# Patient Record
Sex: FEMALE | Race: WHITE | NOT HISPANIC OR LATINO
[De-identification: names, ages, dates, MRNs, and addresses within clinical notes are randomized per-mention and may not be internally consistent; named-entity substitution may affect disease eponyms.]

---

## 2017-02-23 ENCOUNTER — APPOINTMENT (OUTPATIENT)
Dept: FAMILY MEDICINE | Facility: CLINIC | Age: 77
End: 2017-02-23

## 2017-02-23 ENCOUNTER — NON-APPOINTMENT (OUTPATIENT)
Age: 77
End: 2017-02-23

## 2017-02-23 VITALS
DIASTOLIC BLOOD PRESSURE: 80 MMHG | HEIGHT: 62 IN | BODY MASS INDEX: 30.8 KG/M2 | WEIGHT: 167.38 LBS | SYSTOLIC BLOOD PRESSURE: 142 MMHG

## 2017-03-02 ENCOUNTER — RX RENEWAL (OUTPATIENT)
Age: 77
End: 2017-03-02

## 2017-04-06 ENCOUNTER — RX RENEWAL (OUTPATIENT)
Age: 77
End: 2017-04-06

## 2017-04-10 ENCOUNTER — APPOINTMENT (OUTPATIENT)
Dept: FAMILY MEDICINE | Facility: CLINIC | Age: 77
End: 2017-04-10

## 2017-05-31 ENCOUNTER — APPOINTMENT (OUTPATIENT)
Dept: FAMILY MEDICINE | Facility: CLINIC | Age: 77
End: 2017-05-31

## 2017-06-04 ENCOUNTER — RX RENEWAL (OUTPATIENT)
Age: 77
End: 2017-06-04

## 2017-06-12 ENCOUNTER — APPOINTMENT (OUTPATIENT)
Dept: CARDIOLOGY | Facility: CLINIC | Age: 77
End: 2017-06-12

## 2017-06-12 ENCOUNTER — NON-APPOINTMENT (OUTPATIENT)
Age: 77
End: 2017-06-12

## 2017-06-12 ENCOUNTER — APPOINTMENT (OUTPATIENT)
Dept: FAMILY MEDICINE | Facility: CLINIC | Age: 77
End: 2017-06-12

## 2017-06-12 VITALS
WEIGHT: 167 LBS | BODY MASS INDEX: 29.96 KG/M2 | HEIGHT: 62.5 IN | HEART RATE: 56 BPM | SYSTOLIC BLOOD PRESSURE: 136 MMHG | DIASTOLIC BLOOD PRESSURE: 70 MMHG

## 2017-06-12 VITALS
HEART RATE: 63 BPM | BODY MASS INDEX: 29.96 KG/M2 | DIASTOLIC BLOOD PRESSURE: 83 MMHG | HEIGHT: 62.5 IN | SYSTOLIC BLOOD PRESSURE: 151 MMHG | OXYGEN SATURATION: 95 % | WEIGHT: 167 LBS

## 2017-06-12 DIAGNOSIS — I38 ENDOCARDITIS, VALVE UNSPECIFIED: ICD-10-CM

## 2017-06-13 LAB
ALBUMIN SERPL ELPH-MCNC: 4.3 G/DL
ALP BLD-CCNC: 59 U/L
ALT SERPL-CCNC: 14 U/L
ANION GAP SERPL CALC-SCNC: 17 MMOL/L
AST SERPL-CCNC: 19 U/L
BILIRUB SERPL-MCNC: 0.6 MG/DL
BUN SERPL-MCNC: 15 MG/DL
CALCIUM SERPL-MCNC: 9.8 MG/DL
CHLORIDE SERPL-SCNC: 101 MMOL/L
CHOLEST SERPL-MCNC: 119 MG/DL
CHOLEST/HDLC SERPL: 2.8 RATIO
CO2 SERPL-SCNC: 23 MMOL/L
CREAT SERPL-MCNC: 0.7 MG/DL
GLUCOSE SERPL-MCNC: 116 MG/DL
HBA1C MFR BLD HPLC: 6 %
HDLC SERPL-MCNC: 43 MG/DL
LDLC SERPL CALC-MCNC: 56 MG/DL
POTASSIUM SERPL-SCNC: 4.7 MMOL/L
PROT SERPL-MCNC: 7.5 G/DL
SODIUM SERPL-SCNC: 141 MMOL/L
TRIGL SERPL-MCNC: 102 MG/DL
TSH SERPL-ACNC: 0.83 UIU/ML

## 2017-06-20 ENCOUNTER — APPOINTMENT (OUTPATIENT)
Dept: CARDIOLOGY | Facility: CLINIC | Age: 77
End: 2017-06-20

## 2017-07-12 ENCOUNTER — APPOINTMENT (OUTPATIENT)
Dept: CARDIOLOGY | Facility: CLINIC | Age: 77
End: 2017-07-12

## 2017-07-12 VITALS
DIASTOLIC BLOOD PRESSURE: 84 MMHG | WEIGHT: 166 LBS | BODY MASS INDEX: 29.79 KG/M2 | SYSTOLIC BLOOD PRESSURE: 134 MMHG | OXYGEN SATURATION: 97 % | HEIGHT: 62.5 IN | HEART RATE: 69 BPM

## 2017-07-14 ENCOUNTER — RX RENEWAL (OUTPATIENT)
Age: 77
End: 2017-07-14

## 2017-07-27 ENCOUNTER — APPOINTMENT (OUTPATIENT)
Dept: FAMILY MEDICINE | Facility: CLINIC | Age: 77
End: 2017-07-27
Payer: MEDICARE

## 2017-07-27 VITALS
DIASTOLIC BLOOD PRESSURE: 70 MMHG | TEMPERATURE: 98 F | SYSTOLIC BLOOD PRESSURE: 118 MMHG | HEIGHT: 62.5 IN | WEIGHT: 164 LBS | BODY MASS INDEX: 29.43 KG/M2

## 2017-07-27 DIAGNOSIS — M85.80 OTHER SPECIFIED DISORDERS OF BONE DENSITY AND STRUCTURE, UNSPECIFIED SITE: ICD-10-CM

## 2017-07-27 PROCEDURE — 36415 COLL VENOUS BLD VENIPUNCTURE: CPT

## 2017-07-27 PROCEDURE — 99214 OFFICE O/P EST MOD 30 MIN: CPT | Mod: 25

## 2017-07-28 ENCOUNTER — TRANSCRIPTION ENCOUNTER (OUTPATIENT)
Age: 77
End: 2017-07-28

## 2017-07-28 LAB — 25(OH)D3 SERPL-MCNC: 44.4 NG/ML

## 2017-08-02 ENCOUNTER — RX RENEWAL (OUTPATIENT)
Age: 77
End: 2017-08-02

## 2017-08-07 ENCOUNTER — RX RENEWAL (OUTPATIENT)
Age: 77
End: 2017-08-07

## 2017-09-18 ENCOUNTER — APPOINTMENT (OUTPATIENT)
Dept: FAMILY MEDICINE | Facility: CLINIC | Age: 77
End: 2017-09-18
Payer: MEDICARE

## 2017-09-18 VITALS
BODY MASS INDEX: 31.24 KG/M2 | HEART RATE: 79 BPM | WEIGHT: 174.13 LBS | OXYGEN SATURATION: 98 % | HEIGHT: 62.5 IN | SYSTOLIC BLOOD PRESSURE: 132 MMHG | DIASTOLIC BLOOD PRESSURE: 88 MMHG

## 2017-09-18 DIAGNOSIS — Z86.39 PERSONAL HISTORY OF OTHER ENDOCRINE, NUTRITIONAL AND METABOLIC DISEASE: ICD-10-CM

## 2017-09-18 PROCEDURE — 36415 COLL VENOUS BLD VENIPUNCTURE: CPT

## 2017-09-18 PROCEDURE — G0008: CPT

## 2017-09-18 PROCEDURE — 99214 OFFICE O/P EST MOD 30 MIN: CPT | Mod: 25

## 2017-09-18 PROCEDURE — 90662 IIV NO PRSV INCREASED AG IM: CPT

## 2017-09-18 RX ORDER — METHYLPREDNISOLONE 4 MG/1
4 TABLET ORAL
Qty: 1 | Refills: 0 | Status: DISCONTINUED | COMMUNITY
Start: 2017-07-27 | End: 2017-09-18

## 2017-09-19 ENCOUNTER — APPOINTMENT (OUTPATIENT)
Dept: CARDIOLOGY | Facility: CLINIC | Age: 77
End: 2017-09-19
Payer: MEDICARE

## 2017-09-19 VITALS
BODY MASS INDEX: 31.22 KG/M2 | WEIGHT: 174 LBS | HEART RATE: 55 BPM | HEIGHT: 62.5 IN | DIASTOLIC BLOOD PRESSURE: 91 MMHG | SYSTOLIC BLOOD PRESSURE: 134 MMHG

## 2017-09-19 LAB
ALBUMIN SERPL ELPH-MCNC: 4.5 G/DL
ALP BLD-CCNC: 54 U/L
ALT SERPL-CCNC: 20 U/L
ANION GAP SERPL CALC-SCNC: 16 MMOL/L
AST SERPL-CCNC: 22 U/L
BILIRUB SERPL-MCNC: 0.6 MG/DL
BUN SERPL-MCNC: 15 MG/DL
CALCIUM SERPL-MCNC: 9.8 MG/DL
CHLORIDE SERPL-SCNC: 103 MMOL/L
CO2 SERPL-SCNC: 25 MMOL/L
CREAT SERPL-MCNC: 0.85 MG/DL
GLUCOSE SERPL-MCNC: 87 MG/DL
HBA1C MFR BLD HPLC: 6.4 %
POTASSIUM SERPL-SCNC: 4 MMOL/L
PROT SERPL-MCNC: 7.5 G/DL
SODIUM SERPL-SCNC: 144 MMOL/L

## 2017-09-19 PROCEDURE — 99214 OFFICE O/P EST MOD 30 MIN: CPT

## 2017-10-10 ENCOUNTER — RX RENEWAL (OUTPATIENT)
Age: 77
End: 2017-10-10

## 2017-10-17 ENCOUNTER — RX RENEWAL (OUTPATIENT)
Age: 77
End: 2017-10-17

## 2017-11-05 ENCOUNTER — RX RENEWAL (OUTPATIENT)
Age: 77
End: 2017-11-05

## 2017-11-17 ENCOUNTER — RX RENEWAL (OUTPATIENT)
Age: 77
End: 2017-11-17

## 2017-12-18 ENCOUNTER — APPOINTMENT (OUTPATIENT)
Dept: FAMILY MEDICINE | Facility: CLINIC | Age: 77
End: 2017-12-18

## 2018-01-29 ENCOUNTER — APPOINTMENT (OUTPATIENT)
Dept: FAMILY MEDICINE | Facility: CLINIC | Age: 78
End: 2018-01-29
Payer: MEDICARE

## 2018-01-29 VITALS
SYSTOLIC BLOOD PRESSURE: 140 MMHG | BODY MASS INDEX: 32.21 KG/M2 | DIASTOLIC BLOOD PRESSURE: 84 MMHG | HEIGHT: 62.5 IN | WEIGHT: 179.5 LBS

## 2018-01-29 PROCEDURE — 99214 OFFICE O/P EST MOD 30 MIN: CPT | Mod: 25

## 2018-01-29 PROCEDURE — 36415 COLL VENOUS BLD VENIPUNCTURE: CPT

## 2018-01-29 RX ORDER — ISOSORBIDE MONONITRATE 20 MG/1
20 TABLET ORAL
Qty: 30 | Refills: 1 | Status: DISCONTINUED | COMMUNITY
Start: 2017-11-17 | End: 2018-01-29

## 2018-01-30 LAB
ALBUMIN SERPL ELPH-MCNC: 4.1 G/DL
ALP BLD-CCNC: 57 U/L
ALT SERPL-CCNC: 22 U/L
ANION GAP SERPL CALC-SCNC: 18 MMOL/L
AST SERPL-CCNC: 26 U/L
BASOPHILS # BLD AUTO: 0.09 K/UL
BASOPHILS NFR BLD AUTO: 1 %
BILIRUB SERPL-MCNC: 0.6 MG/DL
BUN SERPL-MCNC: 20 MG/DL
CALCIUM SERPL-MCNC: 10.1 MG/DL
CHLORIDE SERPL-SCNC: 101 MMOL/L
CHOLEST SERPL-MCNC: 122 MG/DL
CHOLEST/HDLC SERPL: 2.9 RATIO
CO2 SERPL-SCNC: 22 MMOL/L
CREAT SERPL-MCNC: 0.7 MG/DL
EOSINOPHIL # BLD AUTO: 0.31 K/UL
EOSINOPHIL NFR BLD AUTO: 3.5 %
GLUCOSE SERPL-MCNC: 108 MG/DL
HBA1C MFR BLD HPLC: 6.8 %
HCT VFR BLD CALC: 46.3 %
HDLC SERPL-MCNC: 42 MG/DL
HGB BLD-MCNC: 15 G/DL
IMM GRANULOCYTES NFR BLD AUTO: 0.4 %
LDLC SERPL CALC-MCNC: 60 MG/DL
LYMPHOCYTES # BLD AUTO: 2.4 K/UL
LYMPHOCYTES NFR BLD AUTO: 26.8 %
MAN DIFF?: NORMAL
MCHC RBC-ENTMCNC: 29.8 PG
MCHC RBC-ENTMCNC: 32.4 GM/DL
MCV RBC AUTO: 92 FL
MONOCYTES # BLD AUTO: 0.58 K/UL
MONOCYTES NFR BLD AUTO: 6.5 %
NEUTROPHILS # BLD AUTO: 5.53 K/UL
NEUTROPHILS NFR BLD AUTO: 61.8 %
PLATELET # BLD AUTO: 248 K/UL
POTASSIUM SERPL-SCNC: 4.6 MMOL/L
PROT SERPL-MCNC: 7.2 G/DL
RBC # BLD: 5.03 M/UL
RBC # FLD: 15.2 %
RHEUMATOID FACT SER QL: <7 IU/ML
SODIUM SERPL-SCNC: 141 MMOL/L
TRIGL SERPL-MCNC: 98 MG/DL
TTG IGA SER IA-ACNC: 5.2 UNITS
TTG IGA SER-ACNC: NEGATIVE
TTG IGG SER IA-ACNC: <5 UNITS
TTG IGG SER IA-ACNC: NEGATIVE
WBC # FLD AUTO: 8.95 K/UL

## 2018-01-31 LAB
ENDOMYSIUM IGA SER QL: NEGATIVE
ENDOMYSIUM IGA TITR SER: NORMAL

## 2018-02-13 ENCOUNTER — RX RENEWAL (OUTPATIENT)
Age: 78
End: 2018-02-13

## 2018-02-20 ENCOUNTER — NON-APPOINTMENT (OUTPATIENT)
Age: 78
End: 2018-02-20

## 2018-02-20 ENCOUNTER — APPOINTMENT (OUTPATIENT)
Dept: CARDIOLOGY | Facility: CLINIC | Age: 78
End: 2018-02-20
Payer: MEDICARE

## 2018-02-20 VITALS — BODY MASS INDEX: 32.21 KG/M2 | HEIGHT: 62.5 IN | WEIGHT: 179.5 LBS

## 2018-02-20 VITALS — DIASTOLIC BLOOD PRESSURE: 84 MMHG | SYSTOLIC BLOOD PRESSURE: 207 MMHG | HEART RATE: 70 BPM | OXYGEN SATURATION: 96 %

## 2018-02-20 PROCEDURE — 99214 OFFICE O/P EST MOD 30 MIN: CPT | Mod: 25

## 2018-02-20 PROCEDURE — 93000 ELECTROCARDIOGRAM COMPLETE: CPT

## 2018-02-20 RX ORDER — ISOSORBIDE MONONITRATE 20 MG/1
20 TABLET ORAL
Qty: 90 | Refills: 3 | Status: DISCONTINUED | COMMUNITY
Start: 2017-09-19 | End: 2018-02-20

## 2018-03-19 ENCOUNTER — APPOINTMENT (OUTPATIENT)
Dept: CARDIOLOGY | Facility: CLINIC | Age: 78
End: 2018-03-19
Payer: MEDICARE

## 2018-03-19 VITALS
WEIGHT: 179.5 LBS | HEART RATE: 57 BPM | DIASTOLIC BLOOD PRESSURE: 78 MMHG | SYSTOLIC BLOOD PRESSURE: 161 MMHG | OXYGEN SATURATION: 95 % | BODY MASS INDEX: 32.21 KG/M2 | HEIGHT: 62.5 IN

## 2018-03-19 PROCEDURE — 99213 OFFICE O/P EST LOW 20 MIN: CPT

## 2018-03-21 ENCOUNTER — OTHER (OUTPATIENT)
Age: 78
End: 2018-03-21

## 2018-03-23 ENCOUNTER — RESULT CHARGE (OUTPATIENT)
Age: 78
End: 2018-03-23

## 2018-03-23 ENCOUNTER — OTHER (OUTPATIENT)
Age: 78
End: 2018-03-23

## 2018-04-04 ENCOUNTER — OTHER (OUTPATIENT)
Age: 78
End: 2018-04-04

## 2018-04-12 ENCOUNTER — APPOINTMENT (OUTPATIENT)
Dept: CARDIOLOGY | Facility: CLINIC | Age: 78
End: 2018-04-12
Payer: MEDICARE

## 2018-04-12 VITALS
WEIGHT: 179.5 LBS | OXYGEN SATURATION: 95 % | HEIGHT: 62.5 IN | HEART RATE: 63 BPM | DIASTOLIC BLOOD PRESSURE: 88 MMHG | SYSTOLIC BLOOD PRESSURE: 153 MMHG | BODY MASS INDEX: 32.21 KG/M2

## 2018-04-12 PROCEDURE — 99213 OFFICE O/P EST LOW 20 MIN: CPT

## 2018-04-12 RX ORDER — ISOSORBIDE MONONITRATE 60 MG/1
60 TABLET, EXTENDED RELEASE ORAL
Qty: 90 | Refills: 3 | Status: DISCONTINUED | COMMUNITY
Start: 2018-02-20 | End: 2018-04-12

## 2018-04-12 RX ORDER — ISOSORBIDE MONONITRATE 20 MG/1
20 TABLET ORAL
Qty: 90 | Refills: 2 | Status: DISCONTINUED | COMMUNITY
Start: 2018-03-08 | End: 2018-04-12

## 2018-04-24 ENCOUNTER — APPOINTMENT (OUTPATIENT)
Dept: CARDIOLOGY | Facility: CLINIC | Age: 78
End: 2018-04-24

## 2018-05-11 ENCOUNTER — RX RENEWAL (OUTPATIENT)
Age: 78
End: 2018-05-11

## 2018-05-29 ENCOUNTER — APPOINTMENT (OUTPATIENT)
Dept: FAMILY MEDICINE | Facility: CLINIC | Age: 78
End: 2018-05-29
Payer: MEDICARE

## 2018-05-29 ENCOUNTER — MED ADMIN CHARGE (OUTPATIENT)
Age: 78
End: 2018-05-29

## 2018-05-29 ENCOUNTER — APPOINTMENT (OUTPATIENT)
Dept: CARDIOLOGY | Facility: CLINIC | Age: 78
End: 2018-05-29
Payer: MEDICARE

## 2018-05-29 VITALS
HEIGHT: 62.5 IN | OXYGEN SATURATION: 98 % | DIASTOLIC BLOOD PRESSURE: 89 MMHG | WEIGHT: 179 LBS | BODY MASS INDEX: 32.12 KG/M2 | HEART RATE: 51 BPM | SYSTOLIC BLOOD PRESSURE: 159 MMHG

## 2018-05-29 DIAGNOSIS — M54.42 LUMBAGO WITH SCIATICA, LEFT SIDE: ICD-10-CM

## 2018-05-29 PROCEDURE — 99214 OFFICE O/P EST MOD 30 MIN: CPT

## 2018-05-29 PROCEDURE — 99213 OFFICE O/P EST LOW 20 MIN: CPT

## 2018-05-29 RX ORDER — METHYLPRED ACET/NACL,ISO-OS/PF 40 MG/ML
40 VIAL (ML) INJECTION
Qty: 1 | Refills: 0 | Status: COMPLETED | OUTPATIENT
Start: 2018-05-29

## 2018-05-29 RX ADMIN — METHYLPREDNISOLONE ACETATE 0 MG/ML: 40 INJECTION, SUSPENSION INTRA-ARTICULAR; INTRALESIONAL; INTRAMUSCULAR; SOFT TISSUE at 00:00

## 2018-05-29 NOTE — ASSESSMENT
[FreeTextEntry1] : L low back pain w/ sciatica - depomedrol 40mg IM administered today. cont flexeril qhs prn. start diclofenac 50mg bid prn w/ food.

## 2018-05-29 NOTE — PHYSICAL EXAM
[No Acute Distress] : no acute distress [Well Nourished] : well nourished [Well Developed] : well developed [No Respiratory Distress] : no respiratory distress  [Clear to Auscultation] : lungs were clear to auscultation bilaterally [Normal Rate] : normal rate  [Regular Rhythm] : with a regular rhythm [Normal S1, S2] : normal S1 and S2 [de-identified] : +L straight leg testing w/ pain in lower back radiating down L buttock and posterior thigh

## 2018-05-29 NOTE — HISTORY OF PRESENT ILLNESS
[FreeTextEntry8] : Pt c/o L sciatica x 1 day. She tripped onto dressing table on R side last night, and caught herself on dressing table which caused her body to twist suddenly. Pt then started having L buttock pain radiating down L posterior leg. Pt took 5mg flexeril which helped pain.

## 2018-06-04 ENCOUNTER — APPOINTMENT (OUTPATIENT)
Dept: FAMILY MEDICINE | Facility: CLINIC | Age: 78
End: 2018-06-04

## 2018-06-18 ENCOUNTER — LABORATORY RESULT (OUTPATIENT)
Age: 78
End: 2018-06-18

## 2018-06-18 ENCOUNTER — APPOINTMENT (OUTPATIENT)
Dept: FAMILY MEDICINE | Facility: CLINIC | Age: 78
End: 2018-06-18
Payer: MEDICARE

## 2018-06-18 VITALS
SYSTOLIC BLOOD PRESSURE: 130 MMHG | HEIGHT: 62.5 IN | DIASTOLIC BLOOD PRESSURE: 80 MMHG | BODY MASS INDEX: 29.65 KG/M2 | WEIGHT: 165.25 LBS

## 2018-06-18 PROCEDURE — G0439: CPT

## 2018-06-18 PROCEDURE — 36415 COLL VENOUS BLD VENIPUNCTURE: CPT

## 2018-06-18 NOTE — PHYSICAL EXAM
[No Acute Distress] : no acute distress [Well Nourished] : well nourished [Well-Appearing] : well-appearing [Normal Sclera/Conjunctiva] : normal sclera/conjunctiva [PERRL] : pupils equal round and reactive to light [EOMI] : extraocular movements intact [Normal Outer Ear/Nose] : the outer ears and nose were normal in appearance [Normal Oropharynx] : the oropharynx was normal [No JVD] : no jugular venous distention [Supple] : supple [No Lymphadenopathy] : no lymphadenopathy [Thyroid Normal, No Nodules] : the thyroid was normal and there were no nodules present [No Respiratory Distress] : no respiratory distress  [Clear to Auscultation] : lungs were clear to auscultation bilaterally [No Accessory Muscle Use] : no accessory muscle use [Normal Rate] : normal rate  [Regular Rhythm] : with a regular rhythm [Normal S1, S2] : normal S1 and S2 [No Murmur] : no murmur heard [No Carotid Bruits] : no carotid bruits [No Varicosities] : no varicosities [Pedal Pulses Present] : the pedal pulses are present [No Extremity Clubbing/Cyanosis] : no extremity clubbing/cyanosis [Soft] : abdomen soft [Non Tender] : non-tender [Non-distended] : non-distended [No Masses] : no abdominal mass palpated [No HSM] : no HSM [Normal Bowel Sounds] : normal bowel sounds [Normal Posterior Cervical Nodes] : no posterior cervical lymphadenopathy [Normal Anterior Cervical Nodes] : no anterior cervical lymphadenopathy [No Joint Swelling] : no joint swelling [Grossly Normal Strength/Tone] : grossly normal strength/tone [No Rash] : no rash [Normal Gait] : normal gait [Coordination Grossly Intact] : coordination grossly intact [No Focal Deficits] : no focal deficits [Deep Tendon Reflexes (DTR)] : deep tendon reflexes were 2+ and symmetric [Normal Affect] : the affect was normal [Alert and Oriented x3] : oriented to person, place, and time [Normal Insight/Judgement] : insight and judgment were intact [Right Foot Was Examined] : Right foot ~C was examined [] : right foot [de-identified] : mild edema

## 2018-06-18 NOTE — HEALTH RISK ASSESSMENT
[Good] : ~his/her~  mood as  good [] : No [One fall no injury in past year] : Patient reported one fall in the past year without injury [0] : 2) Feeling down, depressed, or hopeless: Not at all (0) [Change in mental status noted] : No change in mental status noted [] :  [de-identified] : Needs assistance [de-identified] : Needs assistance

## 2018-06-18 NOTE — ASSESSMENT
[FreeTextEntry1] : Patient is in stable condition with no complaints of sciatica, chronic lower extremity edema wearing Velcro ace wraps

## 2018-06-18 NOTE — HISTORY OF PRESENT ILLNESS
[de-identified] : Labile hypertension. Patient checking blood pressure 3 or 4 times a day, sometimes a systolic of 98, sometimes systolic of 200. Patient is asymptomatic symptoms of hypotension discussed patient may use clonodine p.r.n. systolic greater than 200.\par \par Obstructive sleep apnea on BiPAP machine has not been changed in 5 years. May need prescription for new equipment.\par \par Diabetes. Patient has not been checking home glucose\par \par Nonobstructive coronary artery disease, on atorvastatin.\par \par Hypothyroid on thyroxine\par \par

## 2018-06-19 LAB
ALBUMIN SERPL ELPH-MCNC: 4.2 G/DL
ALP BLD-CCNC: 59 U/L
ALT SERPL-CCNC: 16 U/L
ANION GAP SERPL CALC-SCNC: 18 MMOL/L
AST SERPL-CCNC: 19 U/L
BASOPHILS # BLD AUTO: 0.07 K/UL
BASOPHILS NFR BLD AUTO: 0.8 %
BILIRUB SERPL-MCNC: 0.6 MG/DL
BUN SERPL-MCNC: 30 MG/DL
CALCIUM SERPL-MCNC: 10.6 MG/DL
CHLORIDE SERPL-SCNC: 103 MMOL/L
CHOLEST SERPL-MCNC: 108 MG/DL
CHOLEST/HDLC SERPL: 2.8 RATIO
CO2 SERPL-SCNC: 22 MMOL/L
CREAT SERPL-MCNC: 0.68 MG/DL
EOSINOPHIL # BLD AUTO: 0.35 K/UL
EOSINOPHIL NFR BLD AUTO: 3.8 %
GLUCOSE SERPL-MCNC: 112 MG/DL
HBA1C MFR BLD HPLC: 6.8 %
HCT VFR BLD CALC: 45.8 %
HDLC SERPL-MCNC: 38 MG/DL
HGB BLD-MCNC: 14.2 G/DL
IMM GRANULOCYTES NFR BLD AUTO: 0.2 %
LDLC SERPL CALC-MCNC: 52 MG/DL
LYMPHOCYTES # BLD AUTO: 2.88 K/UL
LYMPHOCYTES NFR BLD AUTO: 31.5 %
MAN DIFF?: NORMAL
MCHC RBC-ENTMCNC: 29 PG
MCHC RBC-ENTMCNC: 31 GM/DL
MCV RBC AUTO: 93.5 FL
MONOCYTES # BLD AUTO: 0.65 K/UL
MONOCYTES NFR BLD AUTO: 7.1 %
NEUTROPHILS # BLD AUTO: 5.16 K/UL
NEUTROPHILS NFR BLD AUTO: 56.6 %
PLATELET # BLD AUTO: 206 K/UL
POTASSIUM SERPL-SCNC: 4.3 MMOL/L
PROT SERPL-MCNC: 7.2 G/DL
RBC # BLD: 4.9 M/UL
RBC # FLD: 15 %
SODIUM SERPL-SCNC: 143 MMOL/L
TRIGL SERPL-MCNC: 92 MG/DL
TSH SERPL-ACNC: 0.13 UIU/ML
WBC # FLD AUTO: 9.13 K/UL

## 2018-08-06 ENCOUNTER — TRANSCRIPTION ENCOUNTER (OUTPATIENT)
Age: 78
End: 2018-08-06

## 2018-08-27 ENCOUNTER — RX RENEWAL (OUTPATIENT)
Age: 78
End: 2018-08-27

## 2018-08-28 ENCOUNTER — NON-APPOINTMENT (OUTPATIENT)
Age: 78
End: 2018-08-28

## 2018-08-28 ENCOUNTER — APPOINTMENT (OUTPATIENT)
Dept: CARDIOLOGY | Facility: CLINIC | Age: 78
End: 2018-08-28
Payer: MEDICARE

## 2018-08-28 VITALS
HEIGHT: 62.5 IN | SYSTOLIC BLOOD PRESSURE: 130 MMHG | DIASTOLIC BLOOD PRESSURE: 84 MMHG | HEART RATE: 68 BPM | OXYGEN SATURATION: 95 % | BODY MASS INDEX: 31.04 KG/M2 | WEIGHT: 173 LBS

## 2018-08-28 PROCEDURE — 93000 ELECTROCARDIOGRAM COMPLETE: CPT

## 2018-08-28 PROCEDURE — 99214 OFFICE O/P EST MOD 30 MIN: CPT | Mod: 25

## 2018-08-28 RX ORDER — MAGNESIUM OXIDE 241.3 MG/1000MG
400 TABLET ORAL
Refills: 0 | Status: ACTIVE | COMMUNITY
Start: 2018-08-28

## 2018-08-28 RX ORDER — KETOCONAZOLE 20 MG/G
2 CREAM TOPICAL
Qty: 15 | Refills: 0 | Status: DISCONTINUED | COMMUNITY
Start: 2017-06-10 | End: 2018-08-28

## 2018-08-28 RX ORDER — DIPHENOXYLATE HYDROCHLORIDE AND ATROPINE SULFATE 2.5; .025 MG/1; MG/1
2.5-0.025 TABLET ORAL
Qty: 60 | Refills: 0 | Status: DISCONTINUED | COMMUNITY
Start: 2018-01-29 | End: 2018-08-28

## 2018-08-28 RX ORDER — DICLOFENAC SODIUM 10 MG/G
1 GEL TOPICAL DAILY
Qty: 1 | Refills: 3 | Status: DISCONTINUED | COMMUNITY
Start: 2017-06-12 | End: 2018-08-28

## 2018-08-28 RX ORDER — AMLODIPINE BESYLATE 5 MG/1
5 TABLET ORAL DAILY
Qty: 30 | Refills: 5 | Status: DISCONTINUED | COMMUNITY
Start: 2018-04-12 | End: 2018-08-28

## 2018-08-28 RX ORDER — DICLOFENAC POTASSIUM 50 MG/1
50 TABLET, COATED ORAL TWICE DAILY
Qty: 20 | Refills: 0 | Status: DISCONTINUED | COMMUNITY
Start: 2018-05-29 | End: 2018-08-28

## 2018-09-05 ENCOUNTER — RX RENEWAL (OUTPATIENT)
Age: 78
End: 2018-09-05

## 2018-09-17 ENCOUNTER — RX RENEWAL (OUTPATIENT)
Age: 78
End: 2018-09-17

## 2018-09-19 ENCOUNTER — OTHER (OUTPATIENT)
Age: 78
End: 2018-09-19

## 2018-10-15 ENCOUNTER — APPOINTMENT (OUTPATIENT)
Dept: FAMILY MEDICINE | Facility: CLINIC | Age: 78
End: 2018-10-15
Payer: MEDICARE

## 2018-10-15 VITALS
OXYGEN SATURATION: 98 % | SYSTOLIC BLOOD PRESSURE: 142 MMHG | WEIGHT: 175 LBS | BODY MASS INDEX: 31.4 KG/M2 | RESPIRATION RATE: 16 BRPM | HEART RATE: 68 BPM | DIASTOLIC BLOOD PRESSURE: 88 MMHG | HEIGHT: 62.5 IN

## 2018-10-15 PROCEDURE — 99214 OFFICE O/P EST MOD 30 MIN: CPT | Mod: 25

## 2018-10-15 PROCEDURE — G0008: CPT

## 2018-10-15 PROCEDURE — 90662 IIV NO PRSV INCREASED AG IM: CPT

## 2018-10-15 PROCEDURE — G0439: CPT

## 2018-10-15 PROCEDURE — 36415 COLL VENOUS BLD VENIPUNCTURE: CPT

## 2018-10-15 NOTE — HISTORY OF PRESENT ILLNESS
[Family Member] : family member [FreeTextEntry1] : Yearly physical [de-identified] : Hypertension fairly well-controlled on current meds recent cardiology evaluation.\par \par Diabetes. A1c is less than 7. Recently saw ophthalmologist.\par \par Complaining of multiple osteoarthritis symptoms, including right shoulder pain, low back pain. We'll refer her to physical therapy.\par \par Hypothyroidism not on a 6 days a week\par \par Sleep apnea patient does not tolerate CPAP is not a candidate for oral appliance.\par \par Irritable bowel syndrome, controlled with occasional Librax

## 2018-10-15 NOTE — PHYSICAL EXAM
[No Acute Distress] : no acute distress [Supple] : supple [No Lymphadenopathy] : no lymphadenopathy [Clear to Auscultation] : lungs were clear to auscultation bilaterally [Regular Rhythm] : with a regular rhythm [No Murmur] : no murmur heard [No Carotid Bruits] : no carotid bruits [Soft] : abdomen soft [Left Foot Was Examined] : left foot ~C was examined [None] : no ulcers in either foot were found [] : left foot [de-identified] : Chronic brawny edema. Bilateral compression bandages LE

## 2018-10-15 NOTE — ASSESSMENT
[FreeTextEntry1] : Diffuse osteoarthritis, referred to physical therapy.\par \par Check thyroid on lower dose of Synthroid\par \par Check A1c\par \par Flu shot given

## 2018-10-15 NOTE — REVIEW OF SYSTEMS
[Chest Pain] : no chest pain [Leg Claudication] : no leg claudication [Lower Ext Edema] : lower extremity edema [Abdominal Pain] : abdominal pain [Joint Pain] : joint pain [Back Pain] : back pain [Negative] : Genitourinary [FreeTextEntry9] : Walks with a cane

## 2018-10-16 LAB
HBA1C MFR BLD HPLC: 6.9 %
TSH SERPL-ACNC: 1.63 UIU/ML

## 2018-10-26 ENCOUNTER — RX RENEWAL (OUTPATIENT)
Age: 78
End: 2018-10-26

## 2018-11-19 ENCOUNTER — APPOINTMENT (OUTPATIENT)
Dept: CARDIOLOGY | Facility: CLINIC | Age: 78
End: 2018-11-19
Payer: MEDICARE

## 2018-11-19 ENCOUNTER — NON-APPOINTMENT (OUTPATIENT)
Age: 78
End: 2018-11-19

## 2018-11-19 VITALS
DIASTOLIC BLOOD PRESSURE: 93 MMHG | WEIGHT: 175 LBS | BODY MASS INDEX: 31.4 KG/M2 | SYSTOLIC BLOOD PRESSURE: 143 MMHG | HEART RATE: 73 BPM | HEIGHT: 62.5 IN

## 2018-11-19 PROCEDURE — 99214 OFFICE O/P EST MOD 30 MIN: CPT | Mod: 25

## 2018-11-19 PROCEDURE — 93000 ELECTROCARDIOGRAM COMPLETE: CPT

## 2018-11-19 RX ORDER — CARVEDILOL 6.25 MG/1
6.25 TABLET, FILM COATED ORAL TWICE DAILY
Qty: 180 | Refills: 1 | Status: DISCONTINUED | COMMUNITY
Start: 2018-09-19 | End: 2018-11-19

## 2018-11-19 NOTE — HISTORY OF PRESENT ILLNESS
[FreeTextEntry1] : Pt is an 77 y/o F who presents for f/u HTN.  She has PMH non-obstructive CAD (cardiac cath about 6 yrs ago at [facility], Dr Ramirez, as per pt), HTN, HLD, NIDDM, ESPERANZA, IBS.  TTE 6/2017 shows normal LV function with mild TR/AI, min MR.  She notes that her BP in the morning is high - especially diastolic 90's. She does not get regular exercise but is active and able to take care of her ADLs.  She denies any anginal symptoms at this time.\par \par

## 2018-11-19 NOTE — PHYSICAL EXAM
[General Appearance - Well Developed] : well developed [Normal Appearance] : normal appearance [Well Groomed] : well groomed [General Appearance - Well Nourished] : well nourished [No Deformities] : no deformities [General Appearance - In No Acute Distress] : no acute distress [Normal Conjunctiva] : the conjunctiva exhibited no abnormalities [Eyelids - No Xanthelasma] : the eyelids demonstrated no xanthelasmas [Normal Oral Mucosa] : normal oral mucosa [No Oral Pallor] : no oral pallor [No Oral Cyanosis] : no oral cyanosis [Respiration, Rhythm And Depth] : normal respiratory rhythm and effort [Exaggerated Use Of Accessory Muscles For Inspiration] : no accessory muscle use [Auscultation Breath Sounds / Voice Sounds] : lungs were clear to auscultation bilaterally [Heart Rate And Rhythm] : heart rate and rhythm were normal [Heart Sounds] : normal S1 and S2 [Murmurs] : no murmurs present [Abdomen Soft] : soft [Abdomen Tenderness] : non-tender [Abdomen Mass (___ Cm)] : no abdominal mass palpated [Abnormal Walk] : normal gait [Gait - Sufficient For Exercise Testing] : the gait was sufficient for exercise testing [Nail Clubbing] : no clubbing of the fingernails [Cyanosis, Localized] : no localized cyanosis [Petechial Hemorrhages (___cm)] : no petechial hemorrhages [Skin Color & Pigmentation] : normal skin color and pigmentation [] : no rash [No Venous Stasis] : no venous stasis [Skin Lesions] : no skin lesions [No Skin Ulcers] : no skin ulcer [No Xanthoma] : no  xanthoma was observed [Oriented To Time, Place, And Person] : oriented to person, place, and time [Impaired Insight] : insight and judgment were intact [Affect] : the affect was normal [Mood] : the mood was normal [No Anxiety] : not feeling anxious [FreeTextEntry1] : no JVD or bruits

## 2018-11-19 NOTE — DISCUSSION/SUMMARY
[___ Month(s)] : [unfilled] month(s) [With Me] : with me [FreeTextEntry1] : Pt is an 79 y/o F PMH non-obstructive CAD with normal LV function, HTN, HLD, ESPERANZA, NIDDM who presents today for f/u HTN\par HTN: overall well controlled but DBP elevated in morning  - She is only taking coreg once a day - advised to increase to bid, c/w quinapril.  Advised low salt diet, regular exercise/activity \par Pt has no anginal symptoms.  Last nuclear stress test done at Dr. Ramirez's office shows normal myocardial perfusion.  No need for ischemic workup at this time\par c/w ASA and statin for CAD\par recent LDL - 52\par A1c = 6.9\par She will f/u with me in 4 mnths\par The described plan was discussed with the pt.  All questions and concerns were addressed to the best of my knowledge.\par \par

## 2019-01-16 ENCOUNTER — OTHER (OUTPATIENT)
Age: 79
End: 2019-01-16

## 2019-01-16 RX ORDER — CLONIDINE HYDROCHLORIDE 0.1 MG/1
0.1 TABLET ORAL 3 TIMES DAILY
Qty: 810 | Refills: 2 | Status: DISCONTINUED | COMMUNITY
Start: 2018-04-16 | End: 2019-01-16

## 2019-01-29 ENCOUNTER — RX RENEWAL (OUTPATIENT)
Age: 79
End: 2019-01-29

## 2019-02-06 ENCOUNTER — APPOINTMENT (OUTPATIENT)
Dept: FAMILY MEDICINE | Facility: CLINIC | Age: 79
End: 2019-02-06
Payer: MEDICARE

## 2019-02-06 ENCOUNTER — LABORATORY RESULT (OUTPATIENT)
Age: 79
End: 2019-02-06

## 2019-02-06 VITALS
OXYGEN SATURATION: 96 % | WEIGHT: 174 LBS | BODY MASS INDEX: 31.22 KG/M2 | SYSTOLIC BLOOD PRESSURE: 148 MMHG | HEART RATE: 73 BPM | HEIGHT: 62.5 IN | DIASTOLIC BLOOD PRESSURE: 82 MMHG | RESPIRATION RATE: 16 BRPM | TEMPERATURE: 98 F

## 2019-02-06 DIAGNOSIS — Z87.19 PERSONAL HISTORY OF OTHER DISEASES OF THE DIGESTIVE SYSTEM: ICD-10-CM

## 2019-02-06 DIAGNOSIS — N32.81 OVERACTIVE BLADDER: ICD-10-CM

## 2019-02-06 DIAGNOSIS — Z87.898 PERSONAL HISTORY OF OTHER SPECIFIED CONDITIONS: ICD-10-CM

## 2019-02-06 DIAGNOSIS — J45.909 UNSPECIFIED ASTHMA, UNCOMPLICATED: ICD-10-CM

## 2019-02-06 PROCEDURE — 99214 OFFICE O/P EST MOD 30 MIN: CPT

## 2019-02-06 RX ORDER — POTASSIUM CHLORIDE 750 MG/1
10 TABLET, EXTENDED RELEASE ORAL
Refills: 0 | Status: DISCONTINUED | COMMUNITY
Start: 2018-08-28 | End: 2019-02-06

## 2019-02-06 RX ORDER — CHLORDIAZEPOXIDE HYDROCHLORIDE AND CLIDINIUM BROMIDE 5; 2.5 MG/1; MG/1
5-2.5 CAPSULE ORAL
Qty: 30 | Refills: 3 | Status: DISCONTINUED | COMMUNITY
Start: 2018-01-29 | End: 2019-02-06

## 2019-02-06 NOTE — HISTORY OF PRESENT ILLNESS
[de-identified] : Diabetes patient currently on January A1c in October, less than 7.\par \par Overactive bladder, sense of urgency and sometimes lead to incontinence urinates as often as every hour on some days and every 5 hours on the other days. No dysuria. No foul smell. Cystoscopy in the past. Patient told she had "cysts" might be referring to renal cysts. Father  of bladder cancer\par \par Hypertension blood pressure poorly controlled. Patient has been on furosemide for 5 days per week for leg edema. Edema is lymphedema nonpitting edema. Will discontinue Lasix and start Chlorthaladone

## 2019-02-06 NOTE — ASSESSMENT
[FreeTextEntry1] : Hypertension will DC Lasix start chlorthalidone.\par \par Overactive bladder history of cysts. Check ultrasound.\par \par Low back pain, generalized osteoarthritis referral to physical therapy.\par \par See me 6 weeks

## 2019-02-06 NOTE — REVIEW OF SYSTEMS
[Constipation] : constipation [Diarrhea] : diarrhea [Dysuria] : no dysuria [Incontinence] : incontinence [Frequency] : frequency [Joint Pain] : joint pain [Back Pain] : back pain [Negative] : Respiratory

## 2019-02-06 NOTE — PHYSICAL EXAM
[No Acute Distress] : no acute distress [Clear to Auscultation] : lungs were clear to auscultation bilaterally [Regular Rhythm] : with a regular rhythm [Soft] : abdomen soft [Non Tender] : non-tender [No Masses] : no abdominal mass palpated [No HSM] : no HSM [Normal Anterior Cervical Nodes] : no anterior cervical lymphadenopathy [de-identified] : Bilateral lymphedema

## 2019-02-11 LAB
ALBUMIN SERPL ELPH-MCNC: 4.4 G/DL
ALP BLD-CCNC: 65 U/L
ALT SERPL-CCNC: 17 U/L
ANION GAP SERPL CALC-SCNC: 12 MMOL/L
APPEARANCE: ABNORMAL
AST SERPL-CCNC: 19 U/L
BILIRUB SERPL-MCNC: 0.3 MG/DL
BILIRUBIN URINE: NEGATIVE
BLOOD URINE: ABNORMAL
BUN SERPL-MCNC: 20 MG/DL
CALCIUM SERPL-MCNC: 10.1 MG/DL
CHLORIDE SERPL-SCNC: 105 MMOL/L
CHOLEST SERPL-MCNC: 119 MG/DL
CHOLEST/HDLC SERPL: 3 RATIO
CO2 SERPL-SCNC: 28 MMOL/L
COLOR: YELLOW
CREAT SERPL-MCNC: 0.72 MG/DL
GLUCOSE QUALITATIVE U: NEGATIVE MG/DL
GLUCOSE SERPL-MCNC: 105 MG/DL
HBA1C MFR BLD HPLC: 7.1 %
HDLC SERPL-MCNC: 40 MG/DL
KETONES URINE: NEGATIVE
LDLC SERPL CALC-MCNC: 63 MG/DL
LEUKOCYTE ESTERASE URINE: ABNORMAL
NITRITE URINE: NEGATIVE
PH URINE: 5
POTASSIUM SERPL-SCNC: 3.8 MMOL/L
PROT SERPL-MCNC: 6.9 G/DL
PROTEIN URINE: 30 MG/DL
SODIUM SERPL-SCNC: 145 MMOL/L
SPECIFIC GRAVITY URINE: 1.02
TRIGL SERPL-MCNC: 81 MG/DL
UROBILINOGEN URINE: NEGATIVE MG/DL

## 2019-02-14 ENCOUNTER — FORM ENCOUNTER (OUTPATIENT)
Age: 79
End: 2019-02-14

## 2019-02-15 ENCOUNTER — OUTPATIENT (OUTPATIENT)
Dept: OUTPATIENT SERVICES | Facility: HOSPITAL | Age: 79
LOS: 1 days | End: 2019-02-15
Payer: MEDICARE

## 2019-02-15 ENCOUNTER — APPOINTMENT (OUTPATIENT)
Dept: ULTRASOUND IMAGING | Facility: CLINIC | Age: 79
End: 2019-02-15
Payer: MEDICARE

## 2019-02-15 DIAGNOSIS — N32.81 OVERACTIVE BLADDER: ICD-10-CM

## 2019-02-15 PROCEDURE — 76770 US EXAM ABDO BACK WALL COMP: CPT

## 2019-02-15 PROCEDURE — 76770 US EXAM ABDO BACK WALL COMP: CPT | Mod: 26

## 2019-02-18 ENCOUNTER — RX RENEWAL (OUTPATIENT)
Age: 79
End: 2019-02-18

## 2019-03-04 ENCOUNTER — NON-APPOINTMENT (OUTPATIENT)
Age: 79
End: 2019-03-04

## 2019-03-04 ENCOUNTER — APPOINTMENT (OUTPATIENT)
Dept: CARDIOLOGY | Facility: CLINIC | Age: 79
End: 2019-03-04
Payer: MEDICARE

## 2019-03-04 VITALS
HEIGHT: 62.5 IN | WEIGHT: 174 LBS | DIASTOLIC BLOOD PRESSURE: 82 MMHG | HEART RATE: 61 BPM | OXYGEN SATURATION: 97 % | SYSTOLIC BLOOD PRESSURE: 134 MMHG | BODY MASS INDEX: 31.22 KG/M2

## 2019-03-04 PROCEDURE — 99213 OFFICE O/P EST LOW 20 MIN: CPT | Mod: 25

## 2019-03-04 PROCEDURE — 93000 ELECTROCARDIOGRAM COMPLETE: CPT

## 2019-03-04 NOTE — DISCUSSION/SUMMARY
[___ Month(s)] : [unfilled] month(s) [With Me] : with me [FreeTextEntry1] : Pt is an 79 y/o F PMH non-obstructive CAD with normal LV function, HTN, HLD, ESPERANZA, NIDDM who presents today for f/u HTN\par HTN: overall well controlled, c/w coreg bid, c/w quinapril.  Advised low salt diet, regular exercise/activity \par Pt has no anginal symptoms.  Last nuclear stress test done at Dr. Ramirez's office shows normal myocardial perfusion.  No need for ischemic workup at this time\par c/w ASA and statin for CAD\par recent LDL - 52\par A1c = 6.9\par She will f/u with me in 4 mnths\par The described plan was discussed with the pt.  All questions and concerns were addressed to the best of my knowledge.\par \par

## 2019-03-19 ENCOUNTER — RX RENEWAL (OUTPATIENT)
Age: 79
End: 2019-03-19

## 2019-03-20 ENCOUNTER — TRANSCRIPTION ENCOUNTER (OUTPATIENT)
Age: 79
End: 2019-03-20

## 2019-04-08 ENCOUNTER — APPOINTMENT (OUTPATIENT)
Dept: FAMILY MEDICINE | Facility: CLINIC | Age: 79
End: 2019-04-08
Payer: MEDICARE

## 2019-04-08 VITALS
SYSTOLIC BLOOD PRESSURE: 110 MMHG | WEIGHT: 173 LBS | HEART RATE: 83 BPM | OXYGEN SATURATION: 97 % | DIASTOLIC BLOOD PRESSURE: 66 MMHG | RESPIRATION RATE: 16 BRPM | BODY MASS INDEX: 31.04 KG/M2 | HEIGHT: 62.5 IN

## 2019-04-08 PROCEDURE — 99213 OFFICE O/P EST LOW 20 MIN: CPT

## 2019-04-08 NOTE — PHYSICAL EXAM
[No Acute Distress] : no acute distress [Clear to Auscultation] : lungs were clear to auscultation bilaterally [Regular Rhythm] : with a regular rhythm [No Murmur] : no murmur heard [de-identified] : Lymphedema left worse than right

## 2019-04-08 NOTE — HISTORY OF PRESENT ILLNESS
[de-identified] : Hypertension changed from Lasix to chlorthalidone, blood pressure well controlled. No change in lymphedema. No orthostatic problems

## 2019-04-09 LAB
ALBUMIN SERPL ELPH-MCNC: 4.4 G/DL
ANION GAP SERPL CALC-SCNC: 15 MMOL/L
BUN SERPL-MCNC: 42 MG/DL
CALCIUM SERPL-MCNC: 10 MG/DL
CHLORIDE SERPL-SCNC: 103 MMOL/L
CO2 SERPL-SCNC: 24 MMOL/L
CREAT SERPL-MCNC: 1.03 MG/DL
GLUCOSE SERPL-MCNC: 120 MG/DL
PHOSPHATE SERPL-MCNC: 4.4 MG/DL
POTASSIUM SERPL-SCNC: 4.7 MMOL/L
SODIUM SERPL-SCNC: 142 MMOL/L

## 2019-05-21 ENCOUNTER — APPOINTMENT (OUTPATIENT)
Dept: CARDIOLOGY | Facility: CLINIC | Age: 79
End: 2019-05-21

## 2019-06-07 ENCOUNTER — RX RENEWAL (OUTPATIENT)
Age: 79
End: 2019-06-07

## 2019-07-08 ENCOUNTER — APPOINTMENT (OUTPATIENT)
Dept: CARDIOLOGY | Facility: CLINIC | Age: 79
End: 2019-07-08
Payer: MEDICARE

## 2019-07-08 ENCOUNTER — NON-APPOINTMENT (OUTPATIENT)
Age: 79
End: 2019-07-08

## 2019-07-08 VITALS
BODY MASS INDEX: 29.79 KG/M2 | HEART RATE: 70 BPM | SYSTOLIC BLOOD PRESSURE: 112 MMHG | HEIGHT: 62.5 IN | OXYGEN SATURATION: 94 % | DIASTOLIC BLOOD PRESSURE: 77 MMHG | WEIGHT: 166 LBS

## 2019-07-08 PROCEDURE — 99214 OFFICE O/P EST MOD 30 MIN: CPT | Mod: 25

## 2019-07-08 PROCEDURE — 93000 ELECTROCARDIOGRAM COMPLETE: CPT

## 2019-07-08 RX ORDER — CHLORTHALIDONE 25 MG/1
25 TABLET ORAL DAILY
Qty: 90 | Refills: 1 | Status: DISCONTINUED | COMMUNITY
Start: 2019-02-06 | End: 2019-07-08

## 2019-07-08 NOTE — HISTORY OF PRESENT ILLNESS
[FreeTextEntry1] : Pt is an 80 y/o F who presents for f/u HTN.  She has PMH non-obstructive CAD (cardiac cath about 2011 at [facility], Dr Ramirez, as per pt), HTN, HLD, NIDDM, ESPERANZA, IBS.  TTE 6/2017 shows normal LV function with mild TR/AI, min MR.  BP has been well controlled at home and low at times - she has self d/c'ed coreg and diuretics.  BP now 130's/70-80's.  She does not get regular exercise but is active and able to take care of her ADLs.  She denies any anginal symptoms at this time.\par \par

## 2019-07-08 NOTE — PHYSICAL EXAM
[General Appearance - Well Developed] : well developed [Normal Appearance] : normal appearance [Well Groomed] : well groomed [General Appearance - Well Nourished] : well nourished [No Deformities] : no deformities [Normal Conjunctiva] : the conjunctiva exhibited no abnormalities [General Appearance - In No Acute Distress] : no acute distress [Eyelids - No Xanthelasma] : the eyelids demonstrated no xanthelasmas [Normal Oral Mucosa] : normal oral mucosa [No Oral Pallor] : no oral pallor [No Oral Cyanosis] : no oral cyanosis [Respiration, Rhythm And Depth] : normal respiratory rhythm and effort [Exaggerated Use Of Accessory Muscles For Inspiration] : no accessory muscle use [Auscultation Breath Sounds / Voice Sounds] : lungs were clear to auscultation bilaterally [Heart Rate And Rhythm] : heart rate and rhythm were normal [Heart Sounds] : normal S1 and S2 [Murmurs] : no murmurs present [Abdomen Soft] : soft [Abdomen Tenderness] : non-tender [Abdomen Mass (___ Cm)] : no abdominal mass palpated [Nail Clubbing] : no clubbing of the fingernails [Cyanosis, Localized] : no localized cyanosis [Petechial Hemorrhages (___cm)] : no petechial hemorrhages [Skin Color & Pigmentation] : normal skin color and pigmentation [] : no rash [No Venous Stasis] : no venous stasis [Skin Lesions] : no skin lesions [No Skin Ulcers] : no skin ulcer [No Xanthoma] : no  xanthoma was observed [Oriented To Time, Place, And Person] : oriented to person, place, and time [Impaired Insight] : insight and judgment were intact [Affect] : the affect was normal [Mood] : the mood was normal [No Anxiety] : not feeling anxious [FreeTextEntry1] : a,bulates with walker

## 2019-07-08 NOTE — DISCUSSION/SUMMARY
[___ Month(s)] : [unfilled] month(s) [With Me] : with me [FreeTextEntry1] : Pt is an 78 y/o F PMH non-obstructive CAD with normal LV function, HTN, HLD, ESPERANZA, NIDDM who presents today for f/u HTN\par HTN: overall well controlled, c/w quinapril.  Advised low salt diet, regular exercise/activity \par Pt has no anginal symptoms.  Last nuclear stress test done at Dr. Ramirez's office shows normal myocardial perfusion.  No need for ischemic workup at this time\par c/w ASA and statin for CAD\par recent LDL - 52\par A1c = 6.9 --> 7.1\par She will f/u with me in 4 mnths - repeat TTE at that time\par The described plan was discussed with the pt.  All questions and concerns were addressed to the best of my knowledge.\par \par

## 2019-07-30 ENCOUNTER — OTHER (OUTPATIENT)
Age: 79
End: 2019-07-30

## 2019-09-03 ENCOUNTER — RX RENEWAL (OUTPATIENT)
Age: 79
End: 2019-09-03

## 2019-09-23 ENCOUNTER — APPOINTMENT (OUTPATIENT)
Dept: FAMILY MEDICINE | Facility: CLINIC | Age: 79
End: 2019-09-23
Payer: MEDICARE

## 2019-09-23 VITALS
WEIGHT: 174.25 LBS | HEIGHT: 63 IN | BODY MASS INDEX: 30.88 KG/M2 | OXYGEN SATURATION: 97 % | SYSTOLIC BLOOD PRESSURE: 134 MMHG | DIASTOLIC BLOOD PRESSURE: 80 MMHG | HEART RATE: 61 BPM

## 2019-09-23 DIAGNOSIS — R09.89 OTHER SPECIFIED SYMPTOMS AND SIGNS INVOLVING THE CIRCULATORY AND RESPIRATORY SYSTEMS: ICD-10-CM

## 2019-09-23 PROCEDURE — 99214 OFFICE O/P EST MOD 30 MIN: CPT | Mod: 25

## 2019-09-23 PROCEDURE — G0008: CPT

## 2019-09-23 PROCEDURE — 90653 IIV ADJUVANT VACCINE IM: CPT

## 2019-09-23 RX ORDER — AMOXICILLIN 500 MG/1
500 TABLET, FILM COATED ORAL
Qty: 20 | Refills: 0 | Status: DISCONTINUED | COMMUNITY
Start: 2019-01-12 | End: 2019-09-23

## 2019-09-23 RX ORDER — SULFAMETHOXAZOLE AND TRIMETHOPRIM 800; 160 MG/1; MG/1
800-160 TABLET ORAL TWICE DAILY
Qty: 10 | Refills: 1 | Status: DISCONTINUED | COMMUNITY
Start: 2019-02-11 | End: 2019-09-23

## 2019-09-23 NOTE — HISTORY OF PRESENT ILLNESS
[Formal Caregiver] : formal caregiver [de-identified] : Pt is a 79 year y/o female presenting to the office for reevaluation of her hypertension and fatigue. \par \par The patient has a history of non obstructive coronary artery disease, hyperlipidemia on Atorvastatin, and diabetes. Her last A1c is 7.1 and kidney function is normal. \par \par She has been following with cardiologist Dr. Mccartney for labile hypertension. Her blood pressures have been 174/109 down to 106/109. She is on Carvedilol and Accupril. She has been compliant with low salt diet. She remains active but no formal exercise. She endorses fatigue and sleepiness but no headache or dizziness. \par \par She has been taking non drowsy plain Claritin for her allergy symptoms. Her breathing has been stable. She uses her Symbicort infrequently.

## 2019-09-23 NOTE — END OF VISIT
[FreeTextEntry3] : I, Caio Loomis, acted solely as a scribe for Dr. Norman Patel MD on this date 09/23/2019. \par \par All medical records entries made by the Scribe were at my, Dr. Norman Patel MD, direction and personally dictated by me on 09/23/2019. I have personally reviewed the chart and agree that the record accurately reflects my personal performance of the history, physical exam, assessment, and plan. I have also personally directed, reviewed, and agreed with the chart.

## 2019-09-23 NOTE — PLAN
[FreeTextEntry1] : 1 Continue with medications as outlined above. Her Accupril was changed to 20 mg twice a day. \par \par 2. High-dose flu shot has been administered.\par  \par 3. The patient was instructed to continue monitoring her blood pressure. She will rest for 5 minutes before taking the reading and will take it at different times throughout the day. She will continue low salt intake diet. Call if systolic consistently less than 100 or greater than 160\par \par 4. Routine followup with cardiology Dr. Mccartney. \par \par 5. The patient was sent for routine fasting blood work. \par \par 6. Follow up in a month for blood pressure recheck. \par \par 7. Follow up in 3 months for reevaluation of her diabetes.

## 2019-09-24 ENCOUNTER — RX RENEWAL (OUTPATIENT)
Age: 79
End: 2019-09-24

## 2019-09-24 LAB
25(OH)D3 SERPL-MCNC: 32.4 NG/ML
ALBUMIN SERPL ELPH-MCNC: 4.2 G/DL
ALP BLD-CCNC: 49 U/L
ALT SERPL-CCNC: 13 U/L
ANION GAP SERPL CALC-SCNC: 13 MMOL/L
AST SERPL-CCNC: 16 U/L
BASOPHILS # BLD AUTO: 0.13 K/UL
BASOPHILS NFR BLD AUTO: 1.6 %
BILIRUB SERPL-MCNC: 0.6 MG/DL
BUN SERPL-MCNC: 21 MG/DL
CALCIUM SERPL-MCNC: 9.6 MG/DL
CHLORIDE SERPL-SCNC: 105 MMOL/L
CHOLEST SERPL-MCNC: 102 MG/DL
CHOLEST/HDLC SERPL: 3.3 RATIO
CO2 SERPL-SCNC: 23 MMOL/L
CREAT SERPL-MCNC: 0.85 MG/DL
EOSINOPHIL # BLD AUTO: 0.37 K/UL
EOSINOPHIL NFR BLD AUTO: 4.6 %
ESTIMATED AVERAGE GLUCOSE: 148 MG/DL
GLUCOSE SERPL-MCNC: 122 MG/DL
HBA1C MFR BLD HPLC: 6.8 %
HCT VFR BLD CALC: 40.4 %
HDLC SERPL-MCNC: 31 MG/DL
HGB BLD-MCNC: 12.9 G/DL
IMM GRANULOCYTES NFR BLD AUTO: 0.6 %
LDLC SERPL CALC-MCNC: 47 MG/DL
LYMPHOCYTES # BLD AUTO: 2.62 K/UL
LYMPHOCYTES NFR BLD AUTO: 32.9 %
MAN DIFF?: NORMAL
MCHC RBC-ENTMCNC: 30.4 PG
MCHC RBC-ENTMCNC: 31.9 GM/DL
MCV RBC AUTO: 95.1 FL
MONOCYTES # BLD AUTO: 0.57 K/UL
MONOCYTES NFR BLD AUTO: 7.2 %
NEUTROPHILS # BLD AUTO: 4.23 K/UL
NEUTROPHILS NFR BLD AUTO: 53.1 %
PLATELET # BLD AUTO: 259 K/UL
POTASSIUM SERPL-SCNC: 4.7 MMOL/L
PROT SERPL-MCNC: 6.2 G/DL
RBC # BLD: 4.25 M/UL
RBC # FLD: 13.4 %
SODIUM SERPL-SCNC: 141 MMOL/L
TRIGL SERPL-MCNC: 118 MG/DL
TSH SERPL-ACNC: 1.91 UIU/ML
WBC # FLD AUTO: 7.97 K/UL

## 2019-10-28 ENCOUNTER — APPOINTMENT (OUTPATIENT)
Dept: FAMILY MEDICINE | Facility: CLINIC | Age: 79
End: 2019-10-28
Payer: MEDICARE

## 2019-10-28 VITALS
HEIGHT: 63 IN | SYSTOLIC BLOOD PRESSURE: 126 MMHG | OXYGEN SATURATION: 96 % | BODY MASS INDEX: 30.83 KG/M2 | DIASTOLIC BLOOD PRESSURE: 82 MMHG | WEIGHT: 174 LBS | HEART RATE: 67 BPM

## 2019-10-28 PROCEDURE — 99214 OFFICE O/P EST MOD 30 MIN: CPT

## 2019-10-28 NOTE — PHYSICAL EXAM
[Normal] : normal rate, regular rhythm, normal S1 and S2 and no murmur heard [de-identified] : radial pulses equal bilaterally

## 2019-10-28 NOTE — HISTORY OF PRESENT ILLNESS
[FreeTextEntry1] : follow up for bp check [de-identified] : Blood pressure at home has been high ranging sbp from 140-170. Bp machine checked in office and tested with manual BP in office and readings match. \par \par BP reading on right arm is 15 points less than bp reading on left arm. Denies any claudication on the right side, pulses similar bilaterally. no bruits \par \par Diabetes well controlled last A1c 6.8%.\par \par Thyroid controlled on Levothyroxine.

## 2019-11-05 ENCOUNTER — RX RENEWAL (OUTPATIENT)
Age: 79
End: 2019-11-05

## 2019-11-08 ENCOUNTER — RX RENEWAL (OUTPATIENT)
Age: 79
End: 2019-11-08

## 2019-12-30 ENCOUNTER — APPOINTMENT (OUTPATIENT)
Dept: FAMILY MEDICINE | Facility: CLINIC | Age: 79
End: 2019-12-30
Payer: MEDICARE

## 2019-12-30 VITALS
HEIGHT: 63 IN | BODY MASS INDEX: 31.05 KG/M2 | WEIGHT: 175.25 LBS | DIASTOLIC BLOOD PRESSURE: 92 MMHG | OXYGEN SATURATION: 98 % | SYSTOLIC BLOOD PRESSURE: 162 MMHG | HEART RATE: 64 BPM

## 2019-12-30 PROCEDURE — 99213 OFFICE O/P EST LOW 20 MIN: CPT

## 2019-12-30 NOTE — PHYSICAL EXAM
[Normal] : normal rate, regular rhythm, normal S1 and S2 and no murmur heard [de-identified] : Bilateral edema

## 2019-12-30 NOTE — HISTORY OF PRESENT ILLNESS
[de-identified] : Hypertension.  Indapamide was added to Coreg and quinapril however patient took the pill only briefly due to excessive urination.  Her blood pressure did respond well she took it\par \par Explained that diuresis is indicative of fluid overload that she should take the medication and that increasing urination would eventually improve as fluid overload was corrected [Family Member] : family member

## 2020-01-02 ENCOUNTER — OTHER (OUTPATIENT)
Age: 80
End: 2020-01-02

## 2020-01-07 ENCOUNTER — APPOINTMENT (OUTPATIENT)
Dept: CARDIOLOGY | Facility: CLINIC | Age: 80
End: 2020-01-07
Payer: MEDICARE

## 2020-01-07 ENCOUNTER — NON-APPOINTMENT (OUTPATIENT)
Age: 80
End: 2020-01-07

## 2020-01-07 VITALS — DIASTOLIC BLOOD PRESSURE: 70 MMHG | HEIGHT: 63 IN | HEART RATE: 76 BPM | SYSTOLIC BLOOD PRESSURE: 122 MMHG

## 2020-01-07 VITALS
BODY MASS INDEX: 31.01 KG/M2 | OXYGEN SATURATION: 96 % | SYSTOLIC BLOOD PRESSURE: 150 MMHG | DIASTOLIC BLOOD PRESSURE: 82 MMHG | HEIGHT: 63 IN | HEART RATE: 57 BPM | WEIGHT: 175 LBS

## 2020-01-07 PROCEDURE — 93306 TTE W/DOPPLER COMPLETE: CPT

## 2020-01-07 PROCEDURE — 99213 OFFICE O/P EST LOW 20 MIN: CPT | Mod: 25

## 2020-01-07 PROCEDURE — 93000 ELECTROCARDIOGRAM COMPLETE: CPT

## 2020-01-07 NOTE — REASON FOR VISIT
[Coronary Artery Disease] : coronary artery disease [Follow-Up - Clinic] : a clinic follow-up of [Hyperlipidemia] : hyperlipidemia [Hypertension] : hypertension

## 2020-01-23 ENCOUNTER — NON-APPOINTMENT (OUTPATIENT)
Age: 80
End: 2020-01-23

## 2020-01-23 NOTE — PHYSICAL EXAM
[General Appearance - Well Developed] : well developed [Well Groomed] : well groomed [Normal Appearance] : normal appearance [General Appearance - Well Nourished] : well nourished [General Appearance - In No Acute Distress] : no acute distress [No Deformities] : no deformities [Eyelids - No Xanthelasma] : the eyelids demonstrated no xanthelasmas [Normal Conjunctiva] : the conjunctiva exhibited no abnormalities [No Oral Pallor] : no oral pallor [Normal Oral Mucosa] : normal oral mucosa [No Oral Cyanosis] : no oral cyanosis [Respiration, Rhythm And Depth] : normal respiratory rhythm and effort [Auscultation Breath Sounds / Voice Sounds] : lungs were clear to auscultation bilaterally [Exaggerated Use Of Accessory Muscles For Inspiration] : no accessory muscle use [Heart Sounds] : normal S1 and S2 [Heart Rate And Rhythm] : heart rate and rhythm were normal [Murmurs] : no murmurs present [Abdomen Tenderness] : non-tender [Abdomen Soft] : soft [Abdomen Mass (___ Cm)] : no abdominal mass palpated [Cyanosis, Localized] : no localized cyanosis [Nail Clubbing] : no clubbing of the fingernails [Petechial Hemorrhages (___cm)] : no petechial hemorrhages [] : no rash [Skin Color & Pigmentation] : normal skin color and pigmentation [No Venous Stasis] : no venous stasis [No Skin Ulcers] : no skin ulcer [Skin Lesions] : no skin lesions [Oriented To Time, Place, And Person] : oriented to person, place, and time [No Xanthoma] : no  xanthoma was observed [Affect] : the affect was normal [Impaired Insight] : insight and judgment were intact [Mood] : the mood was normal [No Anxiety] : not feeling anxious [FreeTextEntry1] : a,bulates with walker

## 2020-01-23 NOTE — DISCUSSION/SUMMARY
[___ Month(s)] : [unfilled] month(s) [With Me] : with me [FreeTextEntry1] : Pt is an 78 y/o F PMH non-obstructive CAD with normal LV function, HTN, HLD, ESPERANZA, NIDDM who presents today for f/u HTN\par HTN: overall well controlled, c/w current meds.  Advised low salt diet, regular exercise/activity \par Pt has no anginal symptoms.  Last nuclear stress test done at Dr. Ramirez's office shows normal myocardial perfusion.  No need for ischemic workup at this time\par c/w ASA and statin for CAD\par recent LDL - 52\par A1c = 6.9 --> 7.1\par She will f/u with me in 2 mnths \par The described plan was discussed with the pt.  All questions and concerns were addressed to the best of my knowledge.\par \par

## 2020-01-23 NOTE — HISTORY OF PRESENT ILLNESS
[FreeTextEntry1] : Pt is an 80 y/o F who presents for f/u HTN.  She has PMH non-obstructive CAD (cardiac cath about 2011 at [facility], Dr Ramirez, as per pt), HTN, HLD, NIDDM, ESPERNAZA, IBS.  TTE 6/2017 shows normal LV function with mild TR/AI, min MR.  \par Recently BP was elevated and hydralazine was started.  BP has been better controlled at home. She does not get regular exercise but is active and able to take care of her ADLs.  She denies any anginal symptoms at this time.\par \par \par

## 2020-02-03 ENCOUNTER — APPOINTMENT (OUTPATIENT)
Dept: FAMILY MEDICINE | Facility: CLINIC | Age: 80
End: 2020-02-03
Payer: MEDICARE

## 2020-02-03 VITALS
WEIGHT: 168 LBS | SYSTOLIC BLOOD PRESSURE: 128 MMHG | HEART RATE: 70 BPM | OXYGEN SATURATION: 98 % | DIASTOLIC BLOOD PRESSURE: 82 MMHG | HEIGHT: 63 IN | BODY MASS INDEX: 29.77 KG/M2

## 2020-02-03 LAB
ALBUMIN SERPL ELPH-MCNC: 4.2 G/DL
ANION GAP SERPL CALC-SCNC: 13 MMOL/L
BUN SERPL-MCNC: 32 MG/DL
CALCIUM SERPL-MCNC: 9.7 MG/DL
CHLORIDE SERPL-SCNC: 104 MMOL/L
CO2 SERPL-SCNC: 23 MMOL/L
CREAT SERPL-MCNC: 0.91 MG/DL
ESTIMATED AVERAGE GLUCOSE: 154 MG/DL
GLUCOSE SERPL-MCNC: 139 MG/DL
HBA1C MFR BLD HPLC: 7 %
PHOSPHATE SERPL-MCNC: 3.7 MG/DL
POTASSIUM SERPL-SCNC: 5.1 MMOL/L
SODIUM SERPL-SCNC: 140 MMOL/L

## 2020-02-03 PROCEDURE — G0444 DEPRESSION SCREEN ANNUAL: CPT | Mod: 59

## 2020-02-03 PROCEDURE — 99213 OFFICE O/P EST LOW 20 MIN: CPT | Mod: 25

## 2020-02-03 NOTE — HISTORY OF PRESENT ILLNESS
[de-identified] : Hypertension now well controlled on current regimen.  Tolerating medication well no new complaints recently seen by Dr. Mccartney

## 2020-02-03 NOTE — PHYSICAL EXAM
[Normal] : normal rate, regular rhythm, normal S1 and S2 and no murmur heard [de-identified] : Arthritic deformity of hands mild

## 2020-02-05 RX ORDER — CHLORDIAZEPOXIDE HYDROCHLORIDE AND CLIDINIUM BROMIDE 5; 2.5 MG/1; MG/1
5-2.5 CAPSULE ORAL
Qty: 180 | Refills: 0 | Status: ACTIVE | COMMUNITY
Start: 2019-09-23 | End: 1900-01-01

## 2020-03-09 ENCOUNTER — APPOINTMENT (OUTPATIENT)
Dept: CARDIOLOGY | Facility: CLINIC | Age: 80
End: 2020-03-09
Payer: MEDICARE

## 2020-03-09 VITALS
HEIGHT: 63 IN | OXYGEN SATURATION: 96 % | SYSTOLIC BLOOD PRESSURE: 138 MMHG | WEIGHT: 168 LBS | BODY MASS INDEX: 29.77 KG/M2 | HEART RATE: 66 BPM | DIASTOLIC BLOOD PRESSURE: 70 MMHG

## 2020-03-09 PROCEDURE — 99213 OFFICE O/P EST LOW 20 MIN: CPT

## 2020-03-09 NOTE — PHYSICAL EXAM
[General Appearance - Well Developed] : well developed [Normal Appearance] : normal appearance [Well Groomed] : well groomed [General Appearance - Well Nourished] : well nourished [No Deformities] : no deformities [General Appearance - In No Acute Distress] : no acute distress [Normal Conjunctiva] : the conjunctiva exhibited no abnormalities [Eyelids - No Xanthelasma] : the eyelids demonstrated no xanthelasmas [Normal Oral Mucosa] : normal oral mucosa [No Oral Pallor] : no oral pallor [No Oral Cyanosis] : no oral cyanosis [Respiration, Rhythm And Depth] : normal respiratory rhythm and effort [Exaggerated Use Of Accessory Muscles For Inspiration] : no accessory muscle use [Auscultation Breath Sounds / Voice Sounds] : lungs were clear to auscultation bilaterally [Heart Rate And Rhythm] : heart rate and rhythm were normal [Heart Sounds] : normal S1 and S2 [Murmurs] : no murmurs present [Abdomen Soft] : soft [Abdomen Tenderness] : non-tender [Abdomen Mass (___ Cm)] : no abdominal mass palpated [Nail Clubbing] : no clubbing of the fingernails [Cyanosis, Localized] : no localized cyanosis [Petechial Hemorrhages (___cm)] : no petechial hemorrhages [Skin Color & Pigmentation] : normal skin color and pigmentation [] : no rash [No Venous Stasis] : no venous stasis [Skin Lesions] : no skin lesions [No Skin Ulcers] : no skin ulcer [No Xanthoma] : no  xanthoma was observed [Oriented To Time, Place, And Person] : oriented to person, place, and time [Impaired Insight] : insight and judgment were intact [Affect] : the affect was normal [Mood] : the mood was normal [No Anxiety] : not feeling anxious [FreeTextEntry1] : a,bulates with walker

## 2020-03-09 NOTE — DISCUSSION/SUMMARY
[FreeTextEntry1] : Pt is an 80 y/o F PMH non-obstructive CAD with normal LV function, HTN, HLD, ESPERANZA, NIDDM who presents today for f/u HTN\par HTN: overall well controlled, c/w current meds.  Advised low salt diet, regular exercise/activity \par Pt has no anginal symptoms.  Last nuclear stress test done at Dr. Ramirez's office shows normal myocardial perfusion.  No need for ischemic workup at this time\par c/w ASA and statin for CAD\par recent LDL - 52\par A1c = 6.9 --> 7.1\par She will f/u with me in 4 mnths \par The described plan was discussed with the pt.  All questions and concerns were addressed to the best of my knowledge.\par \par

## 2020-03-09 NOTE — HISTORY OF PRESENT ILLNESS
[FreeTextEntry1] : Pt is an 78 y/o F who presents for f/u HTN.  She has PMH non-obstructive CAD (cardiac cath about 2011 at [facility], Dr Ramirez, as per pt), HTN, HLD, NIDDM, ESPERANZA, IBS.  TTE 6/2017 shows normal LV function with mild TR/AI, min MR.  \par BP has been well controlled at home. She does not get regular exercise but is active and able to take care of her ADLs.  She denies any anginal symptoms at this time.\par \par \par

## 2020-07-23 ENCOUNTER — NON-APPOINTMENT (OUTPATIENT)
Age: 80
End: 2020-07-23

## 2020-07-23 ENCOUNTER — APPOINTMENT (OUTPATIENT)
Dept: CARDIOLOGY | Facility: CLINIC | Age: 80
End: 2020-07-23
Payer: MEDICARE

## 2020-07-23 ENCOUNTER — APPOINTMENT (OUTPATIENT)
Dept: FAMILY MEDICINE | Facility: CLINIC | Age: 80
End: 2020-07-23
Payer: MEDICARE

## 2020-07-23 VITALS
SYSTOLIC BLOOD PRESSURE: 148 MMHG | DIASTOLIC BLOOD PRESSURE: 88 MMHG | OXYGEN SATURATION: 96 % | HEIGHT: 63 IN | WEIGHT: 168 LBS | BODY MASS INDEX: 29.77 KG/M2 | HEART RATE: 63 BPM

## 2020-07-23 VITALS
DIASTOLIC BLOOD PRESSURE: 88 MMHG | HEART RATE: 70 BPM | HEIGHT: 63 IN | BODY MASS INDEX: 30.5 KG/M2 | WEIGHT: 172.13 LBS | OXYGEN SATURATION: 97 % | SYSTOLIC BLOOD PRESSURE: 140 MMHG

## 2020-07-23 DIAGNOSIS — M19.90 UNSPECIFIED OSTEOARTHRITIS, UNSPECIFIED SITE: ICD-10-CM

## 2020-07-23 DIAGNOSIS — K58.9 IRRITABLE BOWEL SYNDROME W/OUT DIARRHEA: ICD-10-CM

## 2020-07-23 DIAGNOSIS — L30.9 DERMATITIS, UNSPECIFIED: ICD-10-CM

## 2020-07-23 PROCEDURE — 93000 ELECTROCARDIOGRAM COMPLETE: CPT

## 2020-07-23 PROCEDURE — 99214 OFFICE O/P EST MOD 30 MIN: CPT | Mod: 25

## 2020-07-23 PROCEDURE — 36415 COLL VENOUS BLD VENIPUNCTURE: CPT

## 2020-07-23 PROCEDURE — 99213 OFFICE O/P EST LOW 20 MIN: CPT | Mod: 25

## 2020-07-23 RX ORDER — DICLOFENAC SODIUM 10 MG/G
1 GEL TOPICAL
Qty: 1 | Refills: 5 | Status: ACTIVE | COMMUNITY
Start: 2020-07-23 | End: 1900-01-01

## 2020-07-23 RX ORDER — KETOCONAZOLE 20 MG/G
2 CREAM TOPICAL
Refills: 0 | Status: DISCONTINUED | COMMUNITY
End: 2020-07-23

## 2020-07-23 RX ORDER — FLUOCINONIDE 0.5 MG/G
0.05 CREAM TOPICAL
Refills: 0 | Status: DISCONTINUED | COMMUNITY
End: 2020-07-23

## 2020-07-23 RX ORDER — CHLORHEXIDINE GLUCONATE, 0.12% ORAL RINSE 1.2 MG/ML
0.12 SOLUTION DENTAL
Qty: 473 | Refills: 0 | Status: DISCONTINUED | COMMUNITY
Start: 2019-04-23 | End: 2020-07-23

## 2020-07-23 RX ORDER — DICYCLOMINE HYDROCHLORIDE 20 MG/1
20 TABLET ORAL EVERY 6 HOURS
Qty: 40 | Refills: 2 | Status: DISCONTINUED | COMMUNITY
Start: 2018-10-15 | End: 2020-07-23

## 2020-07-23 RX ORDER — CARVEDILOL 25 MG/1
25 TABLET, FILM COATED ORAL
Qty: 180 | Refills: 1 | Status: DISCONTINUED | COMMUNITY
Start: 2018-04-04 | End: 2020-07-23

## 2020-07-23 NOTE — PHYSICAL EXAM
[General Appearance - Well Developed] : well developed [Normal Appearance] : normal appearance [Well Groomed] : well groomed [General Appearance - Well Nourished] : well nourished [No Deformities] : no deformities [General Appearance - In No Acute Distress] : no acute distress [Normal Conjunctiva] : the conjunctiva exhibited no abnormalities [Eyelids - No Xanthelasma] : the eyelids demonstrated no xanthelasmas [Normal Oral Mucosa] : normal oral mucosa [No Oral Pallor] : no oral pallor [No Oral Cyanosis] : no oral cyanosis [Respiration, Rhythm And Depth] : normal respiratory rhythm and effort [Exaggerated Use Of Accessory Muscles For Inspiration] : no accessory muscle use [Auscultation Breath Sounds / Voice Sounds] : lungs were clear to auscultation bilaterally [Heart Rate And Rhythm] : heart rate and rhythm were normal [Heart Sounds] : normal S1 and S2 [Murmurs] : no murmurs present [Abdomen Soft] : soft [Abdomen Tenderness] : non-tender [Abdomen Mass (___ Cm)] : no abdominal mass palpated [Nail Clubbing] : no clubbing of the fingernails [Cyanosis, Localized] : no localized cyanosis [Petechial Hemorrhages (___cm)] : no petechial hemorrhages [Skin Color & Pigmentation] : normal skin color and pigmentation [] : no rash [No Venous Stasis] : no venous stasis [Skin Lesions] : no skin lesions [No Skin Ulcers] : no skin ulcer [No Xanthoma] : no  xanthoma was observed [Oriented To Time, Place, And Person] : oriented to person, place, and time [Impaired Insight] : insight and judgment were intact [Affect] : the affect was normal [Mood] : the mood was normal [No Anxiety] : not feeling anxious [FreeTextEntry1] : ambulates with walker

## 2020-07-23 NOTE — PHYSICAL EXAM
[Normal] : no respiratory distress, lungs were clear to auscultation bilaterally and no accessory muscle use [de-identified] : Significant lymphedema

## 2020-07-23 NOTE — HISTORY OF PRESENT ILLNESS
[FreeTextEntry1] : Pt is an 81 y/o F who presents for f/u HTN.  She has PMH non-obstructive CAD (cardiac cath about 2011 at [facility], Dr Ramirez, as per pt), HTN, HLD, NIDDM, ESPERANZA, IBS.  TTE 6/2017 shows normal LV function with mild TR/AI, min MR.  \par BP has been well controlled at home. She does not get regular exercise but is active and able to take care of her ADLs.  She denies any anginal symptoms at this time.\par \par \par

## 2020-07-23 NOTE — ASSESSMENT
[FreeTextEntry1] : Patient is stable on current regimen\par \par Voltaren gel for arthritis of fingers\par \par  will renew medications check labs

## 2020-07-23 NOTE — HISTORY OF PRESENT ILLNESS
[Family Member] : family member [de-identified] : Patient here for follow-up.  Recently saw cardiologist no significant cardiovascular symptoms\par \par Diabetes recent A1c 7.0 will recheck\par \par Hypertension well-controlled on current meds\par \par Hypothyroidism on levothyroxine\par \par Irritable bowel syndrome good response to dicyclomine as needed\par \par Moderate DJD of fingers will give trial of Voltaren gel\par \par Lymphedema remains unchanged\par \par Eczema responds to topical agents

## 2020-07-23 NOTE — DISCUSSION/SUMMARY
[FreeTextEntry1] : Pt is an 81 y/o F PMH non-obstructive CAD with normal LV function, HTN, HLD, ESPERANZA, NIDDM who presents today for f/u HTN\par HTN: overall well controlled, c/w current meds.  Advised low salt diet, regular exercise/activity \par Pt has no anginal symptoms.  Last nuclear stress test done at Dr. Ramirez's office shows normal myocardial perfusion.  No need for ischemic workup at this time\par c/w ASA and statin for CAD\par recent LDL - 52\par A1c = 6.9 --> 7.1\par Pt will return in 6 mnths or sooner as needed but I encouraged communication via phone or portal if necessary. \par The described plan was discussed with the pt.  All questions and concerns were addressed to the best of my knowledge.\par \par

## 2020-07-24 LAB
25(OH)D3 SERPL-MCNC: 30.7 NG/ML
ALBUMIN SERPL ELPH-MCNC: 4.5 G/DL
ALP BLD-CCNC: 54 U/L
ALT SERPL-CCNC: 16 U/L
ANION GAP SERPL CALC-SCNC: 15 MMOL/L
AST SERPL-CCNC: 19 U/L
BILIRUB SERPL-MCNC: 0.5 MG/DL
BUN SERPL-MCNC: 16 MG/DL
CALCIUM SERPL-MCNC: 9.9 MG/DL
CHLORIDE SERPL-SCNC: 105 MMOL/L
CO2 SERPL-SCNC: 22 MMOL/L
CREAT SERPL-MCNC: 0.85 MG/DL
ESTIMATED AVERAGE GLUCOSE: 140 MG/DL
GLUCOSE SERPL-MCNC: 111 MG/DL
HBA1C MFR BLD HPLC: 6.5 %
POTASSIUM SERPL-SCNC: 4.7 MMOL/L
PROT SERPL-MCNC: 6.5 G/DL
SARS-COV-2 IGG SERPL IA-ACNC: 0.15 INDEX
SARS-COV-2 IGG SERPL QL IA: NEGATIVE
SODIUM SERPL-SCNC: 142 MMOL/L
TSH SERPL-ACNC: 1.25 UIU/ML

## 2020-07-30 ENCOUNTER — APPOINTMENT (OUTPATIENT)
Dept: FAMILY MEDICINE | Facility: CLINIC | Age: 80
End: 2020-07-30

## 2020-10-28 ENCOUNTER — APPOINTMENT (OUTPATIENT)
Dept: FAMILY MEDICINE | Facility: CLINIC | Age: 80
End: 2020-10-28
Payer: MEDICARE

## 2020-10-28 VITALS
HEIGHT: 63 IN | HEART RATE: 66 BPM | TEMPERATURE: 97.8 F | WEIGHT: 168 LBS | SYSTOLIC BLOOD PRESSURE: 110 MMHG | BODY MASS INDEX: 29.77 KG/M2 | OXYGEN SATURATION: 98 % | DIASTOLIC BLOOD PRESSURE: 70 MMHG

## 2020-10-28 DIAGNOSIS — Z23 ENCOUNTER FOR IMMUNIZATION: ICD-10-CM

## 2020-10-28 DIAGNOSIS — R53.81 OTHER MALAISE: ICD-10-CM

## 2020-10-28 PROCEDURE — 99214 OFFICE O/P EST MOD 30 MIN: CPT | Mod: 25

## 2020-10-28 PROCEDURE — 90662 IIV NO PRSV INCREASED AG IM: CPT

## 2020-10-28 PROCEDURE — G0008: CPT

## 2020-10-28 RX ORDER — ASPIRIN ENTERIC COATED TABLETS 81 MG 81 MG/1
81 TABLET, DELAYED RELEASE ORAL
Refills: 0 | Status: DISCONTINUED | COMMUNITY
End: 2020-10-28

## 2020-10-28 NOTE — PHYSICAL EXAM
[Normal] : normal rate, regular rhythm, normal S1 and S2 and no murmur heard [de-identified] : Lymphedema

## 2020-10-28 NOTE — ASSESSMENT
[FreeTextEntry1] : Patient will discontinue aspirin given advanced age\par \par Check A1c and renal\par \par Flu shot given Continue lipitor Continue lipitor Continue lipitor

## 2020-10-28 NOTE — REVIEW OF SYSTEMS
Refill Request    Last seen:  06/13/17  Pending appointment: 07/03/17  Medication:  ondansetron (ZOFRAN) 8 MG tablet  Last Filled:  Unknown  Qty:  30 tablets with 1 refill  Labs: n/a    Evans Archer RN, BSN, OCN     [Negative] : Respiratory [FreeTextEntry7] : IBS has been quiescent

## 2020-10-28 NOTE — HISTORY OF PRESENT ILLNESS
[de-identified] : Diabetes home glucose between 130 and 150 no cardiovascular symptoms no dyspnea on exertion\par \par Nonobstructive coronary artery disease on atorvastatin 20\par \par Hypertension well-controlled on current meds\par \par Mild asthma uses Symbicort rarely\par \par Physical deconditioning uses a walker to ambulate\par \par Lymphedema of lower legs remains unchanged

## 2020-10-29 LAB
ALBUMIN SERPL ELPH-MCNC: 4.3 G/DL
ANION GAP SERPL CALC-SCNC: 13 MMOL/L
BUN SERPL-MCNC: 23 MG/DL
CALCIUM SERPL-MCNC: 10.3 MG/DL
CHLORIDE SERPL-SCNC: 103 MMOL/L
CO2 SERPL-SCNC: 25 MMOL/L
CREAT SERPL-MCNC: 0.84 MG/DL
ESTIMATED AVERAGE GLUCOSE: 140 MG/DL
GLUCOSE SERPL-MCNC: 140 MG/DL
HBA1C MFR BLD HPLC: 6.5 %
PHOSPHATE SERPL-MCNC: 3.7 MG/DL
POTASSIUM SERPL-SCNC: 4.5 MMOL/L
SODIUM SERPL-SCNC: 141 MMOL/L

## 2020-12-07 ENCOUNTER — RX RENEWAL (OUTPATIENT)
Age: 80
End: 2020-12-07

## 2020-12-09 ENCOUNTER — RX RENEWAL (OUTPATIENT)
Age: 80
End: 2020-12-09

## 2021-01-06 ENCOUNTER — NON-APPOINTMENT (OUTPATIENT)
Age: 81
End: 2021-01-06

## 2021-01-06 ENCOUNTER — APPOINTMENT (OUTPATIENT)
Dept: CARDIOLOGY | Facility: CLINIC | Age: 81
End: 2021-01-06
Payer: MEDICARE

## 2021-01-06 VITALS
HEIGHT: 63 IN | WEIGHT: 168 LBS | SYSTOLIC BLOOD PRESSURE: 120 MMHG | OXYGEN SATURATION: 97 % | HEART RATE: 74 BPM | DIASTOLIC BLOOD PRESSURE: 84 MMHG | BODY MASS INDEX: 29.77 KG/M2

## 2021-01-06 PROCEDURE — 99214 OFFICE O/P EST MOD 30 MIN: CPT | Mod: 25

## 2021-01-06 PROCEDURE — 93000 ELECTROCARDIOGRAM COMPLETE: CPT

## 2021-01-06 NOTE — DISCUSSION/SUMMARY
[FreeTextEntry1] : Pt is an 79 y/o F PMH non-obstructive CAD with normal LV function, HTN, HLD, ESPERANZA, NIDDM who presents today for f/u HTN\par HTN: elevated in the morning, change hydralazine - 20mg in AM and 30mg in PM. c/w all other meds Advised low salt diet, regular exercise/activity \par Pt has no anginal symptoms.  Last nuclear stress test done at Dr. Ramirez's office shows normal myocardial perfusion.  No need for ischemic workup at this time\par c/w ASA and statin for CAD\par recent LDL - 52\par A1c = 6.9 --> 7.1\par Pt will return in 4 mnths or sooner as needed but I encouraged communication via phone or portal if necessary. \par The described plan was discussed with the pt.  All questions and concerns were addressed to the best of my knowledge.\par \par

## 2021-01-06 NOTE — HISTORY OF PRESENT ILLNESS
[FreeTextEntry1] : Pt is an 81 y/o F who presents for f/u HTN.  She has PMH non-obstructive CAD (cardiac cath about 2011 at [facility], Dr Ramirez, as per pt), HTN, HLD, NIDDM, ESPERANZA, IBS.  \par Her BP has been elevated at home but getting better with adjusting doses of hydralazine.  She has not been taking indapamide\par \par TTE 6/2017 shows normal LV function with mild TR/AI, min MR. \par TTE 01/2020 EF 55-60%, mild AI/TR, mild DD \par \par BP has been well controlled at home. She does not get regular exercise but is active and able to take care of her ADLs.  She denies any anginal symptoms at this time.\par \par \par

## 2021-01-08 ENCOUNTER — APPOINTMENT (OUTPATIENT)
Dept: FAMILY MEDICINE | Facility: CLINIC | Age: 81
End: 2021-01-08
Payer: MEDICARE

## 2021-01-08 VITALS
BODY MASS INDEX: 22.75 KG/M2 | WEIGHT: 168 LBS | SYSTOLIC BLOOD PRESSURE: 128 MMHG | OXYGEN SATURATION: 96 % | HEART RATE: 67 BPM | DIASTOLIC BLOOD PRESSURE: 80 MMHG | HEIGHT: 72 IN

## 2021-01-08 PROCEDURE — 99214 OFFICE O/P EST MOD 30 MIN: CPT | Mod: 25

## 2021-01-08 PROCEDURE — 20610 DRAIN/INJ JOINT/BURSA W/O US: CPT

## 2021-01-08 RX ORDER — METHYLPRED ACET/NACL,ISO-OS/PF 40 MG/ML
40 VIAL (ML) INJECTION
Qty: 1 | Refills: 0 | Status: COMPLETED | OUTPATIENT
Start: 2021-01-08

## 2021-01-08 RX ADMIN — METHYLPREDNISOLONE ACETATE 0 MG/ML: 40 INJECTION, SUSPENSION INTRA-ARTICULAR; INTRALESIONAL; INTRAMUSCULAR; SOFT TISSUE at 00:00

## 2021-01-08 NOTE — ASSESSMENT
[FreeTextEntry1] : As above follow-up with GI as needed call me if shoulder fails to respond for orthopedic evaluation see me in 3 months

## 2021-01-08 NOTE — HISTORY OF PRESENT ILLNESS
[de-identified] : Right shoulder pain moderate effusion noted history of arthritis on x-ray Medrol 40 mg p.o. Marcaine 1/2 cc home physical therapy discussed if persists will need MRI and orthopedic work-up\par \par Hypertension patient has had episodes of hypertensive crisis cardiology recently increased hydralazine home blood pressure otherwise normal except for these brief episodes patient may take extra 20 mg of hydralazine if it recurs\par \par On levothyroxine for hypothyroidism insurance will not pay for levothyroxine Synthroid prescribed\par \par No chest pain with exertion\par \par Chronic reflux type GERD slight regurgitation of food at night consistent with hiatal hernia.  Proper diet discussed will prescribe omeprazole daily for 1 month if persists will need EGD

## 2021-01-08 NOTE — PHYSICAL EXAM
[Normal] : normal rate, regular rhythm, normal S1 and S2 and no murmur heard [de-identified] : Swollen right shoulder

## 2021-01-27 ENCOUNTER — APPOINTMENT (OUTPATIENT)
Dept: CARDIOLOGY | Facility: CLINIC | Age: 81
End: 2021-01-27

## 2021-01-29 ENCOUNTER — NON-APPOINTMENT (OUTPATIENT)
Age: 81
End: 2021-01-29

## 2021-02-21 ENCOUNTER — RX RENEWAL (OUTPATIENT)
Age: 81
End: 2021-02-21

## 2021-03-23 ENCOUNTER — TRANSCRIPTION ENCOUNTER (OUTPATIENT)
Age: 81
End: 2021-03-23

## 2021-03-24 ENCOUNTER — NON-APPOINTMENT (OUTPATIENT)
Age: 81
End: 2021-03-24

## 2021-05-12 ENCOUNTER — APPOINTMENT (OUTPATIENT)
Dept: CARDIOLOGY | Facility: CLINIC | Age: 81
End: 2021-05-12
Payer: MEDICARE

## 2021-05-12 ENCOUNTER — NON-APPOINTMENT (OUTPATIENT)
Age: 81
End: 2021-05-12

## 2021-05-12 VITALS
BODY MASS INDEX: 29.77 KG/M2 | WEIGHT: 168 LBS | HEART RATE: 64 BPM | SYSTOLIC BLOOD PRESSURE: 140 MMHG | DIASTOLIC BLOOD PRESSURE: 90 MMHG | HEIGHT: 63 IN | OXYGEN SATURATION: 95 %

## 2021-05-12 PROCEDURE — 99213 OFFICE O/P EST LOW 20 MIN: CPT | Mod: 25

## 2021-05-12 PROCEDURE — 93000 ELECTROCARDIOGRAM COMPLETE: CPT

## 2021-05-26 ENCOUNTER — OUTPATIENT (OUTPATIENT)
Dept: OUTPATIENT SERVICES | Facility: HOSPITAL | Age: 81
LOS: 1 days | End: 2021-05-26
Payer: MEDICARE

## 2021-05-26 ENCOUNTER — NON-APPOINTMENT (OUTPATIENT)
Age: 81
End: 2021-05-26

## 2021-05-26 ENCOUNTER — APPOINTMENT (OUTPATIENT)
Dept: FAMILY MEDICINE | Facility: CLINIC | Age: 81
End: 2021-05-26
Payer: MEDICARE

## 2021-05-26 ENCOUNTER — APPOINTMENT (OUTPATIENT)
Dept: RADIOLOGY | Facility: CLINIC | Age: 81
End: 2021-05-26
Payer: MEDICARE

## 2021-05-26 VITALS
TEMPERATURE: 97.2 F | SYSTOLIC BLOOD PRESSURE: 120 MMHG | DIASTOLIC BLOOD PRESSURE: 70 MMHG | OXYGEN SATURATION: 95 % | WEIGHT: 163 LBS | BODY MASS INDEX: 28.88 KG/M2 | HEART RATE: 77 BPM | HEIGHT: 63 IN

## 2021-05-26 DIAGNOSIS — M15.9 POLYOSTEOARTHRITIS, UNSPECIFIED: ICD-10-CM

## 2021-05-26 DIAGNOSIS — M75.101 UNSPECIFIED ROTATOR CUFF TEAR OR RUPTURE OF RIGHT SHOULDER, NOT SPECIFIED AS TRAUMATIC: ICD-10-CM

## 2021-05-26 DIAGNOSIS — M19.049 PRIMARY OSTEOARTHRITIS, UNSPECIFIED HAND: ICD-10-CM

## 2021-05-26 DIAGNOSIS — K21.9 GASTRO-ESOPHAGEAL REFLUX DISEASE W/OUT ESOPHAGITIS: ICD-10-CM

## 2021-05-26 PROCEDURE — 20610 DRAIN/INJ JOINT/BURSA W/O US: CPT

## 2021-05-26 PROCEDURE — 99214 OFFICE O/P EST MOD 30 MIN: CPT | Mod: 25

## 2021-05-26 PROCEDURE — 73030 X-RAY EXAM OF SHOULDER: CPT | Mod: 26,RT

## 2021-05-26 PROCEDURE — 73030 X-RAY EXAM OF SHOULDER: CPT

## 2021-05-26 RX ORDER — METHYLPRED ACET/NACL,ISO-OS/PF 40 MG/ML
40 VIAL (ML) INJECTION
Qty: 1 | Refills: 0 | Status: COMPLETED | OUTPATIENT
Start: 2021-05-26

## 2021-05-26 RX ADMIN — METHYLPREDNISOLONE ACETATE 0 MG/ML: 40 INJECTION, SUSPENSION INTRA-ARTICULAR; INTRALESIONAL; INTRAMUSCULAR; SOFT TISSUE at 00:00

## 2021-05-26 NOTE — ASSESSMENT
[FreeTextEntry1] : Right shoulder pain will give second injection of Medrol x-ray ordered\par \par Osteoarthritis of fingers use Voltaren and Tylenol\par \par GERD under control

## 2021-05-26 NOTE — DISCUSSION/SUMMARY
[FreeTextEntry1] : Pt is an 81 y/o F PMH non-obstructive CAD with normal LV function, HTN, HLD, ESPERANZA, NIDDM who presents today for f/u HTN\par \par HTN: \par well controlled\par c/w hydralazine - 20mg in AM and 30mg in PM. c/w all other meds \par Advised low salt diet, regular exercise/activity \par \par Pt has no anginal symptoms.  Last nuclear stress test done at Dr. Ramirez's office shows normal myocardial perfusion.  No need for ischemic workup at this time\par c/w ASA and statin for CAD\par recent LDL - 52\par A1c = 6.9 --> 7.1 -> 6.5\par \par DM:\par A1c 6.5\par follows with PCP\par \par Pt will return in 6 mnths or sooner as needed but I encouraged communication via phone or portal if necessary. \par The described plan was discussed with the pt.  All questions and concerns were addressed to the best of my knowledge.\par \par

## 2021-05-26 NOTE — PHYSICAL EXAM
[Normal] : normal rate, regular rhythm, normal S1 and S2 and no murmur heard [de-identified] : Brawny edema

## 2021-05-26 NOTE — PHYSICAL EXAM
[Well Developed] : well developed [Well Nourished] : well nourished [No Acute Distress] : no acute distress [Normal Conjunctiva] : normal conjunctiva [Normal Venous Pressure] : normal venous pressure [No Carotid Bruit] : no carotid bruit [Normal S1, S2] : normal S1, S2 [No Murmur] : no murmur [No Rub] : no rub [No Gallop] : no gallop [Clear Lung Fields] : clear lung fields [Good Air Entry] : good air entry [No Respiratory Distress] : no respiratory distress  [Soft] : abdomen soft [Non Tender] : non-tender [No Masses/organomegaly] : no masses/organomegaly [Normal Bowel Sounds] : normal bowel sounds [No Cyanosis] : no cyanosis [No Clubbing] : no clubbing [No Varicosities] : no varicosities [No Rash] : no rash [No Skin Lesions] : no skin lesions [Moves all extremities] : moves all extremities [No Focal Deficits] : no focal deficits [Normal Speech] : normal speech [Alert and Oriented] : alert and oriented [Normal memory] : normal memory [de-identified] : ambulates with walker [de-identified] : b/l LE chronic edema

## 2021-05-26 NOTE — HISTORY OF PRESENT ILLNESS
[FreeTextEntry1] : Pt is an 79 y/o F who presents for f/u HTN.  She has PMH non-obstructive CAD (cardiac cath about 2011 at [facility], Dr Ramirez, as per pt), HTN, HLD, NIDDM, ESPERANZA, IBS.  \par She states that she is feeling well and has no cardiac complaints - denies CP, SOB, diaphoresis, palpitations, dizziness, syncope, LE edema, PND, orthopnea. \par Her BP has been well controlled overall. \par She is anxious about her daughter who does not have a job right now\par COVID vaccine 03/2021 PFizer \par \par TTE 6/2017 shows normal LV function with mild TR/AI, min MR. \par TTE 01/2020 EF 55-60%, mild AI/TR, mild DD \par \par \par \par \par

## 2021-05-26 NOTE — HISTORY OF PRESENT ILLNESS
[Other: _____] : [unfilled] [de-identified] : GI work-up for GERD shows hiatal hernia symptoms are controlled with diet and occasional antacid\par \par Deforming osteoarthritis of fingers bilaterally using Voltaren gel once a day recommend increasing to 4 times a day\par \par Right shoulder pain has recurred after significant relief with intra-articular steroid patient declined surgery at this time she is able to function comb her hair wash her face etc.\par \par Decreased ability to abduct shoulder shoulder effusion versus lipoma.  40 mg Medrol 2 cc Marcaine easily introduced posterior approach\par \par Hypothyroid on levothyroxine

## 2021-05-28 LAB
ALBUMIN SERPL ELPH-MCNC: 4.6 G/DL
ALP BLD-CCNC: 62 U/L
ALT SERPL-CCNC: 12 U/L
ANION GAP SERPL CALC-SCNC: 17 MMOL/L
AST SERPL-CCNC: 15 U/L
BASOPHILS # BLD AUTO: 0.09 K/UL
BASOPHILS NFR BLD AUTO: 1.2 %
BILIRUB SERPL-MCNC: 0.6 MG/DL
BUN SERPL-MCNC: 24 MG/DL
CALCIUM SERPL-MCNC: 10.2 MG/DL
CHLORIDE SERPL-SCNC: 102 MMOL/L
CO2 SERPL-SCNC: 22 MMOL/L
CREAT SERPL-MCNC: 0.86 MG/DL
EOSINOPHIL # BLD AUTO: 0.26 K/UL
EOSINOPHIL NFR BLD AUTO: 3.4 %
ESTIMATED AVERAGE GLUCOSE: 126 MG/DL
GLUCOSE SERPL-MCNC: 121 MG/DL
HBA1C MFR BLD HPLC: 6 %
HCT VFR BLD CALC: 41.9 %
HGB BLD-MCNC: 13.2 G/DL
IMM GRANULOCYTES NFR BLD AUTO: 0.3 %
LYMPHOCYTES # BLD AUTO: 2.29 K/UL
LYMPHOCYTES NFR BLD AUTO: 30.1 %
MAN DIFF?: NORMAL
MCHC RBC-ENTMCNC: 30 PG
MCHC RBC-ENTMCNC: 31.5 GM/DL
MCV RBC AUTO: 95.2 FL
MONOCYTES # BLD AUTO: 0.5 K/UL
MONOCYTES NFR BLD AUTO: 6.6 %
NEUTROPHILS # BLD AUTO: 4.46 K/UL
NEUTROPHILS NFR BLD AUTO: 58.4 %
PLATELET # BLD AUTO: 262 K/UL
POTASSIUM SERPL-SCNC: 4.5 MMOL/L
PROT SERPL-MCNC: 6.5 G/DL
RBC # BLD: 4.4 M/UL
RBC # FLD: 13.5 %
SODIUM SERPL-SCNC: 142 MMOL/L
TSH SERPL-ACNC: 1.02 UIU/ML
WBC # FLD AUTO: 7.62 K/UL

## 2021-07-16 ENCOUNTER — APPOINTMENT (OUTPATIENT)
Dept: FAMILY MEDICINE | Facility: CLINIC | Age: 81
End: 2021-07-16
Payer: MEDICARE

## 2021-07-16 VITALS
BODY MASS INDEX: 28.7 KG/M2 | OXYGEN SATURATION: 96 % | HEART RATE: 78 BPM | SYSTOLIC BLOOD PRESSURE: 122 MMHG | WEIGHT: 162 LBS | DIASTOLIC BLOOD PRESSURE: 80 MMHG | HEIGHT: 63 IN | TEMPERATURE: 97.4 F

## 2021-07-16 PROCEDURE — 99214 OFFICE O/P EST MOD 30 MIN: CPT

## 2021-07-16 NOTE — HISTORY OF PRESENT ILLNESS
[FreeTextEntry8] : 1 or 2 loose stools a day for the past 2 weeks.  History of irritable bowel syndrome.  History of recurrent diarrhea.  Had small bowel resection in the distant past had gallbladder removed 10 years ago is on Metformin for diabetes for many years.  Slight lower abdominal cramps stool is brown no blood no undigested food no recent antibiotic use\par \par Abdomen is soft no apparent distress\par \par Functional diarrhea add fiber to the diet use MiraLAX and Kaopectate if persists stool for C. difficile consider Questran

## 2021-08-03 LAB
C DIFF TOX GENS STL QL NAA+PROBE: NORMAL
CDIFF BY PCR: NOT DETECTED

## 2021-09-16 ENCOUNTER — APPOINTMENT (OUTPATIENT)
Dept: FAMILY MEDICINE | Facility: CLINIC | Age: 81
End: 2021-09-16
Payer: MEDICARE

## 2021-09-16 VITALS
HEIGHT: 63 IN | WEIGHT: 162 LBS | BODY MASS INDEX: 28.7 KG/M2 | DIASTOLIC BLOOD PRESSURE: 80 MMHG | TEMPERATURE: 97.2 F | HEART RATE: 64 BPM | OXYGEN SATURATION: 97 % | SYSTOLIC BLOOD PRESSURE: 142 MMHG

## 2021-09-16 DIAGNOSIS — S03.00XA DISLOCATION OF JAW, UNSPECIFIED SIDE, INITIAL ENCOUNTER: ICD-10-CM

## 2021-09-16 PROCEDURE — 99213 OFFICE O/P EST LOW 20 MIN: CPT

## 2021-09-16 NOTE — PHYSICAL EXAM
[No Acute Distress] : no acute distress [Well-Appearing] : well-appearing [Normal Sclera/Conjunctiva] : normal sclera/conjunctiva [PERRL] : pupils equal round and reactive to light [Normal Outer Ear/Nose] : the outer ears and nose were normal in appearance [Normal TMs] : both tympanic membranes were normal [No Respiratory Distress] : no respiratory distress  [No Accessory Muscle Use] : no accessory muscle use [Normal Rate] : normal rate  [Normal Affect] : the affect was normal [Normal Insight/Judgement] : insight and judgment were intact [de-identified] : some cerumen of right ear

## 2021-09-16 NOTE — PLAN
[FreeTextEntry1] : TMJ\par - cerumen removed from R ear- no erythema of TM or canal. Positive TTP to TMJ with clicking of jaw\par - trial of advil and soft foods\par - dental f/u if no improvement

## 2021-09-16 NOTE — HISTORY OF PRESENT ILLNESS
[FreeTextEntry8] : 81 year old female presents with right sided ear and jaw pain for 5 days but worse last night. Denies headache ,fever, congestion and other associated sx.

## 2021-09-23 ENCOUNTER — APPOINTMENT (OUTPATIENT)
Dept: FAMILY MEDICINE | Facility: CLINIC | Age: 81
End: 2021-09-23
Payer: MEDICARE

## 2021-09-23 VITALS
HEART RATE: 69 BPM | TEMPERATURE: 97 F | DIASTOLIC BLOOD PRESSURE: 86 MMHG | SYSTOLIC BLOOD PRESSURE: 140 MMHG | BODY MASS INDEX: 28.7 KG/M2 | HEIGHT: 63 IN | WEIGHT: 162 LBS | OXYGEN SATURATION: 98 %

## 2021-09-23 PROCEDURE — G0008: CPT

## 2021-09-23 PROCEDURE — 20610 DRAIN/INJ JOINT/BURSA W/O US: CPT

## 2021-09-23 PROCEDURE — 99213 OFFICE O/P EST LOW 20 MIN: CPT | Mod: 25

## 2021-09-23 PROCEDURE — 90662 IIV NO PRSV INCREASED AG IM: CPT

## 2021-09-23 RX ORDER — CELECOXIB 200 MG/1
200 CAPSULE ORAL
Qty: 90 | Refills: 0 | Status: DISCONTINUED | COMMUNITY
Start: 2017-07-27 | End: 2021-09-23

## 2021-09-23 NOTE — HISTORY OF PRESENT ILLNESS
[de-identified] : Recurrence of right shoulder pain limited range of motion pain with external rotation.  X-ray showed calcific tendinosis of the infraspinatus tendon as well as chondrocalcinosis of the acromioclavicular joint.  Patient refuses surgery\par \par Betadine x3 40 mg Medrol 2 cc Marcaine posterior approach.  If fails to improve physical therapy referral given\par \par Intermittent diarrhea diet controlled\par \par Hypertension well-controlled on current meds\par \par Diabetes A1c in May was 6.0\par \par Flu shot given

## 2021-09-24 ENCOUNTER — MED ADMIN CHARGE (OUTPATIENT)
Age: 81
End: 2021-09-24

## 2021-09-24 RX ORDER — METHYLPRED ACET/NACL,ISO-OS/PF 40 MG/ML
40 VIAL (ML) INJECTION
Qty: 1 | Refills: 0 | Status: COMPLETED | OUTPATIENT
Start: 2021-09-24

## 2021-09-24 RX ADMIN — METHYLPREDNISOLONE ACETATE 0 MG/ML: 40 INJECTION, SUSPENSION INTRA-ARTICULAR; INTRALESIONAL; INTRAMUSCULAR; SOFT TISSUE at 00:00

## 2021-12-02 ENCOUNTER — NON-APPOINTMENT (OUTPATIENT)
Age: 81
End: 2021-12-02

## 2021-12-02 ENCOUNTER — APPOINTMENT (OUTPATIENT)
Dept: CARDIOLOGY | Facility: CLINIC | Age: 81
End: 2021-12-02
Payer: MEDICARE

## 2021-12-02 VITALS
WEIGHT: 162 LBS | DIASTOLIC BLOOD PRESSURE: 78 MMHG | OXYGEN SATURATION: 95 % | HEART RATE: 59 BPM | HEIGHT: 63 IN | BODY MASS INDEX: 28.7 KG/M2 | SYSTOLIC BLOOD PRESSURE: 120 MMHG

## 2021-12-02 DIAGNOSIS — R07.89 OTHER CHEST PAIN: ICD-10-CM

## 2021-12-02 PROCEDURE — 93000 ELECTROCARDIOGRAM COMPLETE: CPT

## 2021-12-02 PROCEDURE — 99214 OFFICE O/P EST MOD 30 MIN: CPT | Mod: 25

## 2021-12-02 NOTE — HISTORY OF PRESENT ILLNESS
[FreeTextEntry1] : Pt is an 80 y/o F who presents for f/u HTN.  She has PMH non-obstructive CAD (cardiac cath about 2011 at [facility], Dr Ramirez, as per pt), HTN, HLD, NIDDM, ESPERANZA, IBS.  \par She states that she has been getting chest discomfort for the past few weeks, not associated with exertion\par Her BP has been well controlled overall. \par She is accompanied by her daughter\par COVID vaccine 03/2021 PFizer \par \par TTE 6/2017 shows normal LV function with mild TR/AI, min MR. \par TTE 01/2020 EF 55-60%, mild AI/TR, mild DD \par \par \par \par \par

## 2021-12-02 NOTE — DISCUSSION/SUMMARY
[FreeTextEntry1] : Pt is an 79 y/o F PMH non-obstructive CAD with normal LV function, HTN, HLD, ESPERANZA, NIDDM who presents today with CP\par \par CP:\par Will check pharm nuc stress test , pt cannot walk on treadmill\par Advised pt to go to the nearest ED if symptoms persist or worsen \par c/w ASA and statin for CAD\par recent LDL - 52\par A1c = 6.9 --> 7.1 -> 6.5\par \par HTN: \par well controlled\par c/w hydralazine - 20mg in AM and 30mg in PM. c/w all other meds \par Advised low salt diet, regular exercise/activity \par \par DM:\par A1c 6\par follows with PCP\par \par The described plan was discussed with the pt and daughter.  All questions and concerns were addressed to the best of my knowledge.\par \par

## 2021-12-02 NOTE — PHYSICAL EXAM
[Well Developed] : well developed [Well Nourished] : well nourished [No Acute Distress] : no acute distress [Normal Conjunctiva] : normal conjunctiva [Normal Venous Pressure] : normal venous pressure [No Carotid Bruit] : no carotid bruit [Normal S1, S2] : normal S1, S2 [No Murmur] : no murmur [No Rub] : no rub [No Gallop] : no gallop [Clear Lung Fields] : clear lung fields [Good Air Entry] : good air entry [No Respiratory Distress] : no respiratory distress  [Soft] : abdomen soft [Non Tender] : non-tender [No Masses/organomegaly] : no masses/organomegaly [Normal Bowel Sounds] : normal bowel sounds [No Cyanosis] : no cyanosis [No Clubbing] : no clubbing [No Varicosities] : no varicosities [No Rash] : no rash [No Skin Lesions] : no skin lesions [Moves all extremities] : moves all extremities [No Focal Deficits] : no focal deficits [Normal Speech] : normal speech [Alert and Oriented] : alert and oriented [Normal memory] : normal memory [de-identified] : ambulates with walker [de-identified] : b/l LE chronic edema

## 2021-12-14 ENCOUNTER — APPOINTMENT (OUTPATIENT)
Dept: CARDIOLOGY | Facility: CLINIC | Age: 81
End: 2021-12-14
Payer: MEDICARE

## 2021-12-14 PROCEDURE — 93017 CV STRESS TEST TRACING ONLY: CPT

## 2021-12-14 PROCEDURE — 93016 CV STRESS TEST SUPVJ ONLY: CPT

## 2021-12-14 PROCEDURE — A9500: CPT

## 2021-12-14 PROCEDURE — 93018 CV STRESS TEST I&R ONLY: CPT

## 2021-12-14 PROCEDURE — 78452 HT MUSCLE IMAGE SPECT MULT: CPT

## 2021-12-20 ENCOUNTER — TRANSCRIPTION ENCOUNTER (OUTPATIENT)
Age: 81
End: 2021-12-20

## 2021-12-30 ENCOUNTER — APPOINTMENT (OUTPATIENT)
Dept: DERMATOLOGY | Facility: CLINIC | Age: 81
End: 2021-12-30
Payer: MEDICARE

## 2021-12-30 DIAGNOSIS — D48.5 NEOPLASM OF UNCERTAIN BEHAVIOR OF SKIN: ICD-10-CM

## 2021-12-30 DIAGNOSIS — D18.01 HEMANGIOMA OF SKIN AND SUBCUTANEOUS TISSUE: ICD-10-CM

## 2021-12-30 PROCEDURE — 99202 OFFICE O/P NEW SF 15 MIN: CPT

## 2021-12-30 NOTE — HISTORY OF PRESENT ILLNESS
[FreeTextEntry1] : Evaluation of growths [de-identified] : First visit for 81-year-old [facility] female presenting for evaluation of growths. Particularly concerned about lesions on the face. No history of skin cancer

## 2021-12-30 NOTE — PHYSICAL EXAM
[Alert] : alert [Oriented x 3] : ~L oriented x 3 [Well Nourished] : well nourished [FreeTextEntry3] : The following areas were examined and no significant abnormalities were seen except as noted below:\par \par Type I skin\par \par scalp, face, eyelids, nose, lips, ears, neck, chest, abdomen, back, buttocks, right arm, left arm, right hand, left hand,\par right  leg, left leg, right foot, left foot\par Breast and groin exams offered and declined by patient.\par \par Right distal lateral nose: 4 x 4 mm pink telangiectatic papule (patient states this was biopsied at [facility] dermatology- lesion has not changed in 3 years)\par Trunk: Mild 2 mm bright erythematous papules\par \par No suspicious lesions seen\par \par

## 2022-01-03 ENCOUNTER — NON-APPOINTMENT (OUTPATIENT)
Age: 82
End: 2022-01-03

## 2022-01-03 ENCOUNTER — APPOINTMENT (OUTPATIENT)
Dept: CARDIOLOGY | Facility: CLINIC | Age: 82
End: 2022-01-03
Payer: MEDICARE

## 2022-01-03 VITALS
BODY MASS INDEX: 28.7 KG/M2 | WEIGHT: 162 LBS | HEART RATE: 66 BPM | SYSTOLIC BLOOD PRESSURE: 170 MMHG | OXYGEN SATURATION: 96 % | DIASTOLIC BLOOD PRESSURE: 82 MMHG | HEIGHT: 63 IN

## 2022-01-03 PROCEDURE — 93000 ELECTROCARDIOGRAM COMPLETE: CPT

## 2022-01-03 PROCEDURE — 99214 OFFICE O/P EST MOD 30 MIN: CPT | Mod: 25

## 2022-01-03 NOTE — REVIEW OF SYSTEMS
[Negative] : Heme/Lymph [SOB] : no shortness of breath [Dyspnea on exertion] : dyspnea during exertion [Chest Discomfort] : no chest discomfort [Leg Claudication] : no intermittent leg claudication [Palpitations] : no palpitations [Orthopnea] : no orthopnea [Syncope] : no syncope

## 2022-01-03 NOTE — PHYSICAL EXAM
[Well Developed] : well developed [Well Nourished] : well nourished [No Acute Distress] : no acute distress [Normal Conjunctiva] : normal conjunctiva [Normal Venous Pressure] : normal venous pressure [No Carotid Bruit] : no carotid bruit [Normal S1, S2] : normal S1, S2 [No Murmur] : no murmur [No Rub] : no rub [No Gallop] : no gallop [Clear Lung Fields] : clear lung fields [Good Air Entry] : good air entry [No Respiratory Distress] : no respiratory distress  [Soft] : abdomen soft [Non Tender] : non-tender [No Masses/organomegaly] : no masses/organomegaly [Normal Bowel Sounds] : normal bowel sounds [No Cyanosis] : no cyanosis [No Clubbing] : no clubbing [No Varicosities] : no varicosities [No Rash] : no rash [No Skin Lesions] : no skin lesions [Moves all extremities] : moves all extremities [No Focal Deficits] : no focal deficits [Normal Speech] : normal speech [Alert and Oriented] : alert and oriented [Normal memory] : normal memory [de-identified] : ambulates with walker [de-identified] : b/l LE chronic edema

## 2022-01-03 NOTE — HISTORY OF PRESENT ILLNESS
[FreeTextEntry1] : Pt is an 80 y/o F who presents for f/u HTN.  She has PMH non-obstructive CAD (cardiac cath about 2011 at [facility], Dr Ramirez, as per pt), HTN, HLD, NIDDM, ESPERANZA, IBS.  \par She recently had pharm nuc stress test which showed small, mild defect.  She is overall feeling well besides her usual VENTURA which has not worsened (ambulates with walker).  She denies CP, diaphoresis, palpitations, dizziness, syncope, PND, orthopnea. \par She notes that her diastolic BP has been elevated at home\par She is accompanied by her daughter\par COVID vaccine 03/2021 PFizer \par \par TTE 6/2017 shows normal LV function with mild TR/AI, min MR. \par TTE 01/2020 EF 55-60%, mild AI/TR, mild DD \par Pharm Nuc stress test 12/2021 small, mild defect in apical ant wall that is reversible\par \par \par \par \par

## 2022-01-03 NOTE — DISCUSSION/SUMMARY
[FreeTextEntry1] : Pt is an 80 y/o F PMH non-obstructive CAD with normal LV function, HTN, HLD, ESPERANZA, NIDDM \par \par TTE 6/2017 shows normal LV function with mild TR/AI, min MR. \par TTE 01/2020 EF 55-60%, mild AI/TR, mild DD \par Pharm Nuc stress test 12/2021 small, mild defect in apical ant wall that is reversible\par \par CAD:\par small, mild defects on nuc\par c/w ASA and statin \par recent LDL - 52\par A1c = 6.9 --> 7.1 -> 6.5\par repeat labs\par \par HTN: \par not well controlled\par increase hydralazine to 30mg bid. \par c/w all other meds \par Advised low salt diet, regular exercise/activity \par \par DM:\par follows with PCP\par c/w current meds\par goal A1c <7\par Advised lifestyle modifications \par \par Pt will return in 3 mnths or sooner as needed but I encouraged communication via phone or portal if necessary. \par The described plan was discussed with the pt and daughter.  All questions and concerns were addressed to the best of my knowledge.\par \par

## 2022-01-10 LAB
ALBUMIN SERPL ELPH-MCNC: 4.3 G/DL
ALP BLD-CCNC: 73 U/L
ALT SERPL-CCNC: 12 U/L
ANION GAP SERPL CALC-SCNC: 14 MMOL/L
AST SERPL-CCNC: 20 U/L
BASOPHILS # BLD AUTO: 0.09 K/UL
BASOPHILS NFR BLD AUTO: 1 %
BILIRUB SERPL-MCNC: 0.5 MG/DL
BUN SERPL-MCNC: 16 MG/DL
CALCIUM SERPL-MCNC: 10.2 MG/DL
CHLORIDE SERPL-SCNC: 106 MMOL/L
CHOLEST SERPL-MCNC: 120 MG/DL
CO2 SERPL-SCNC: 24 MMOL/L
CREAT SERPL-MCNC: 0.7 MG/DL
EOSINOPHIL # BLD AUTO: 0.3 K/UL
EOSINOPHIL NFR BLD AUTO: 3.5 %
ESTIMATED AVERAGE GLUCOSE: 123 MG/DL
GLUCOSE SERPL-MCNC: 125 MG/DL
HBA1C MFR BLD HPLC: 5.9 %
HCT VFR BLD CALC: 40.9 %
HDLC SERPL-MCNC: 41 MG/DL
HGB BLD-MCNC: 13 G/DL
IMM GRANULOCYTES NFR BLD AUTO: 0.8 %
LDLC SERPL CALC-MCNC: 58 MG/DL
LYMPHOCYTES # BLD AUTO: 1.88 K/UL
LYMPHOCYTES NFR BLD AUTO: 21.7 %
MAN DIFF?: NORMAL
MCHC RBC-ENTMCNC: 30 PG
MCHC RBC-ENTMCNC: 31.8 GM/DL
MCV RBC AUTO: 94.5 FL
MONOCYTES # BLD AUTO: 0.55 K/UL
MONOCYTES NFR BLD AUTO: 6.4 %
NEUTROPHILS # BLD AUTO: 5.77 K/UL
NEUTROPHILS NFR BLD AUTO: 66.6 %
NONHDLC SERPL-MCNC: 79 MG/DL
PLATELET # BLD AUTO: 260 K/UL
POTASSIUM SERPL-SCNC: 4.6 MMOL/L
PROT SERPL-MCNC: 6.3 G/DL
RBC # BLD: 4.33 M/UL
RBC # FLD: 14.4 %
SODIUM SERPL-SCNC: 143 MMOL/L
TRIGL SERPL-MCNC: 107 MG/DL
WBC # FLD AUTO: 8.66 K/UL

## 2022-01-17 ENCOUNTER — APPOINTMENT (OUTPATIENT)
Dept: FAMILY MEDICINE | Facility: CLINIC | Age: 82
End: 2022-01-17
Payer: MEDICARE

## 2022-01-17 VITALS
BODY MASS INDEX: 28.7 KG/M2 | RESPIRATION RATE: 16 BRPM | HEART RATE: 47 BPM | DIASTOLIC BLOOD PRESSURE: 70 MMHG | OXYGEN SATURATION: 95 % | WEIGHT: 162 LBS | SYSTOLIC BLOOD PRESSURE: 104 MMHG | TEMPERATURE: 97.3 F | HEIGHT: 63 IN

## 2022-01-17 DIAGNOSIS — S99.922A UNSPECIFIED INJURY OF LEFT FOOT, INITIAL ENCOUNTER: ICD-10-CM

## 2022-01-17 DIAGNOSIS — R60.0 LOCALIZED EDEMA: ICD-10-CM

## 2022-01-17 PROCEDURE — G0444 DEPRESSION SCREEN ANNUAL: CPT | Mod: 59

## 2022-01-17 PROCEDURE — 99213 OFFICE O/P EST LOW 20 MIN: CPT | Mod: 25

## 2022-01-17 RX ORDER — COLD-HOT PACK
EACH MISCELLANEOUS
Refills: 0 | Status: ACTIVE | COMMUNITY

## 2022-01-17 RX ORDER — MULTIVIT-MIN/IRON/FOLIC ACID/K 18-600-40
CAPSULE ORAL
Refills: 0 | Status: ACTIVE | COMMUNITY

## 2022-01-17 NOTE — HISTORY OF PRESENT ILLNESS
[FreeTextEntry8] : Stubbed left second toe 1 week ago now swelling of her instep.  Slight ecchymosis toe is nontender no fracture.  Patient has chronic edema to begin with\par \par Elevate leg resume indapamide 1.25 as needed

## 2022-01-27 DIAGNOSIS — B37.9 CANDIDIASIS, UNSPECIFIED: ICD-10-CM

## 2022-01-28 ENCOUNTER — NON-APPOINTMENT (OUTPATIENT)
Age: 82
End: 2022-01-28

## 2022-03-22 ENCOUNTER — APPOINTMENT (OUTPATIENT)
Dept: FAMILY MEDICINE | Facility: CLINIC | Age: 82
End: 2022-03-22
Payer: MEDICARE

## 2022-03-22 VITALS
SYSTOLIC BLOOD PRESSURE: 122 MMHG | WEIGHT: 163 LBS | BODY MASS INDEX: 28.88 KG/M2 | OXYGEN SATURATION: 98 % | HEART RATE: 63 BPM | DIASTOLIC BLOOD PRESSURE: 80 MMHG | RESPIRATION RATE: 16 BRPM | TEMPERATURE: 97.2 F | HEIGHT: 63 IN

## 2022-03-22 DIAGNOSIS — I89.0 LYMPHEDEMA, NOT ELSEWHERE CLASSIFIED: ICD-10-CM

## 2022-03-22 PROCEDURE — 99214 OFFICE O/P EST MOD 30 MIN: CPT

## 2022-03-22 NOTE — PHYSICAL EXAM
[Normal] : normal rate, regular rhythm, normal S1 and S2 and no murmur heard [de-identified] : Lymphedema left worse than right

## 2022-03-22 NOTE — PLAN
[FreeTextEntry1] : Physical therapy for bilateral shoulder pain\par \par Continue current meds see me every 6 months

## 2022-03-22 NOTE — HISTORY OF PRESENT ILLNESS
[Other: _____] : [unfilled] [FreeTextEntry1] : Checkup no complaints [de-identified] : Patient doing well has no complaints lower extremity lymphedema remains unchanged\par \par Diabetes under good control with Janumet\par \par Hypertension Home blood pressure usually within normal limits currently on hydralazine 30 mg 3 times a day and indapamide and quinapril\par \par Bilateral shoulder pain persists unable to abduct arms more than 90 degrees\par \par Hypothyroidism on levothyroxine

## 2022-03-22 NOTE — REVIEW OF SYSTEMS
[facility] Dermatology Visit    Thank you for allowing us to participate in your care. Your findings, instructions and follow-up plan are as follows:         When should I call my doctor?    If you are worsening or not improving, please, contact us or seek urgent care as noted below.     Who should I call with questions (adults)?    [facility] (adult and pediatric): [phone]     [facility] (adult): [phone]    For urgent needs outside of business hours call the [facility] at [phone] and ask for the dermatology resident on call    If this is a medical emergency and you are unable to reach an ER, Call 911      Who should I call with questions (pediatric)?  [facility]- Pediatric Dermatology  Dr. Mallika Bella, Dr. Simon Parker, Dr. Macy Bishop, Nikkie Roldan, PA  Dr. mAanda Calles, Dr. Mariah Mack & Dr. Pablo Rodas  Non Urgent  Nurse Triage Line; [phone]- Binta and Marcia RN Care Coordinators   [place] (/Complex ) [phone]    If you need a prescription refill, please contact your pharmacy. Refills are approved or denied by our Physicians during normal business hours, Monday through Fridays  Per office policy, refills will not be granted if you have not been seen within the past year (or sooner depending on your child's condition)    Scheduling Information:  Pediatric Appointment Scheduling and Call Center [phone]  Radiology Scheduling- [phone]  Sedation Unit Scheduling- [phone]  [place] Scheduling- General [phone]; Pediatric Dermatology [phone]  Main  Services: [phone]  Bengali: [phone]  Mexican: [phone]  Hmong/Hungarian/Kyrgyz: [phone]  Preadmission Nursing Department Fax Number: [phone] (Fax all pre-operative paperwork to this number)    For urgent matters arising during evenings,  weekends, or holidays that cannot wait for normal business hours please call [phone] and ask for the Dermatology Resident On-Call to be paged.         [Negative] : Respiratory [FreeTextEntry9] : Requires a walker to ambulate

## 2022-03-24 LAB
ALBUMIN SERPL ELPH-MCNC: 4.5 G/DL
ALP BLD-CCNC: 68 U/L
ALT SERPL-CCNC: 13 U/L
ANION GAP SERPL CALC-SCNC: 13 MMOL/L
AST SERPL-CCNC: 18 U/L
BASOPHILS # BLD AUTO: 0.12 K/UL
BASOPHILS NFR BLD AUTO: 1.5 %
BILIRUB SERPL-MCNC: 0.3 MG/DL
BUN SERPL-MCNC: 23 MG/DL
CALCIUM SERPL-MCNC: 10.1 MG/DL
CHLORIDE SERPL-SCNC: 105 MMOL/L
CO2 SERPL-SCNC: 23 MMOL/L
CREAT SERPL-MCNC: 0.64 MG/DL
EGFR: 88 ML/MIN/1.73M2
EOSINOPHIL # BLD AUTO: 0.35 K/UL
EOSINOPHIL NFR BLD AUTO: 4.3 %
ESTIMATED AVERAGE GLUCOSE: 131 MG/DL
GLUCOSE SERPL-MCNC: 103 MG/DL
HBA1C MFR BLD HPLC: 6.2 %
HCT VFR BLD CALC: 40.8 %
HGB BLD-MCNC: 13.1 G/DL
IMM GRANULOCYTES NFR BLD AUTO: 0.6 %
LYMPHOCYTES # BLD AUTO: 2.96 K/UL
LYMPHOCYTES NFR BLD AUTO: 36 %
MAN DIFF?: NORMAL
MCHC RBC-ENTMCNC: 30 PG
MCHC RBC-ENTMCNC: 32.1 GM/DL
MCV RBC AUTO: 93.4 FL
MONOCYTES # BLD AUTO: 0.58 K/UL
MONOCYTES NFR BLD AUTO: 7.1 %
NEUTROPHILS # BLD AUTO: 4.16 K/UL
NEUTROPHILS NFR BLD AUTO: 50.5 %
PLATELET # BLD AUTO: 234 K/UL
POTASSIUM SERPL-SCNC: 4.7 MMOL/L
PROT SERPL-MCNC: 6.4 G/DL
RBC # BLD: 4.37 M/UL
RBC # FLD: 14.1 %
SODIUM SERPL-SCNC: 141 MMOL/L
WBC # FLD AUTO: 8.22 K/UL

## 2022-04-04 ENCOUNTER — APPOINTMENT (OUTPATIENT)
Dept: CARDIOLOGY | Facility: CLINIC | Age: 82
End: 2022-04-04
Payer: MEDICARE

## 2022-04-04 ENCOUNTER — NON-APPOINTMENT (OUTPATIENT)
Age: 82
End: 2022-04-04

## 2022-04-04 VITALS — OXYGEN SATURATION: 95 % | HEART RATE: 59 BPM | SYSTOLIC BLOOD PRESSURE: 150 MMHG | DIASTOLIC BLOOD PRESSURE: 92 MMHG

## 2022-04-04 PROCEDURE — 99214 OFFICE O/P EST MOD 30 MIN: CPT | Mod: 25

## 2022-04-04 PROCEDURE — 93000 ELECTROCARDIOGRAM COMPLETE: CPT

## 2022-05-03 ENCOUNTER — APPOINTMENT (OUTPATIENT)
Dept: FAMILY MEDICINE | Facility: CLINIC | Age: 82
End: 2022-05-03
Payer: MEDICARE

## 2022-05-03 ENCOUNTER — TRANSCRIPTION ENCOUNTER (OUTPATIENT)
Age: 82
End: 2022-05-03

## 2022-05-03 DIAGNOSIS — U07.1 COVID-19: ICD-10-CM

## 2022-05-03 PROCEDURE — 99442: CPT | Mod: CS,95

## 2022-05-04 ENCOUNTER — APPOINTMENT (OUTPATIENT)
Dept: DISASTER EMERGENCY | Facility: HOSPITAL | Age: 82
End: 2022-05-04

## 2022-05-04 ENCOUNTER — OUTPATIENT (OUTPATIENT)
Dept: OUTPATIENT SERVICES | Facility: HOSPITAL | Age: 82
LOS: 1 days | End: 2022-05-04

## 2022-05-04 VITALS
DIASTOLIC BLOOD PRESSURE: 84 MMHG | RESPIRATION RATE: 18 BRPM | HEART RATE: 55 BPM | SYSTOLIC BLOOD PRESSURE: 154 MMHG | TEMPERATURE: 98 F | OXYGEN SATURATION: 95 %

## 2022-05-04 VITALS
DIASTOLIC BLOOD PRESSURE: 80 MMHG | HEART RATE: 58 BPM | OXYGEN SATURATION: 95 % | SYSTOLIC BLOOD PRESSURE: 169 MMHG | RESPIRATION RATE: 17 BRPM | HEIGHT: 63 IN | TEMPERATURE: 98 F | WEIGHT: 162.04 LBS

## 2022-05-04 DIAGNOSIS — U07.1 COVID-19: ICD-10-CM

## 2022-05-04 RX ORDER — BEBTELOVIMAB 87.5 MG/ML
175 INJECTION, SOLUTION INTRAVENOUS ONCE
Refills: 0 | Status: COMPLETED | OUTPATIENT
Start: 2022-05-04 | End: 2022-05-04

## 2022-05-04 RX ADMIN — BEBTELOVIMAB 175 MILLIGRAM(S): 87.5 INJECTION, SOLUTION INTRAVENOUS at 15:13

## 2022-05-04 NOTE — MONOCLONAL ANTIBODY INFUSION - ASSESSMENT AND PLAN
This is a 81 yrs old female with PMHx of diabetes, HTN, HLD and recently diagnosed with Covid-19 infection who was referred for monoclonal antibody infusion by her provider after testing positive for COVID 19 on 5/2/22.  Patient states he has been experiencing cough, sore throat, malaise, and nasal congestion since 5/1/22.-. He denies any CP, SOB, chills, numbness/tingling in b/l limbs, loss of sensation or motor function, N/V/D. Pt is vaccinated and boosted with Pfizer x 2.        PLAN:  - Injection procedure explained to patient   - Consent for monoclonal antibody injection obtained   - Risk & benefits discussed/all questions answered  - Inject Bebtelovimab 175 mg over 1 minute.  - Observe patient for one hour post administration    I have reviewed the Bebtelovimab Emergency Use Authorization (EUA) and I have provided the patient or patient's caregiver with the following information:    1. FDA has authorized emergency use Bebtelovimab, which is not an FDA-approved biological product.  2. The patient or patient's caregiver has the option to accept or refuse administration of Bebtelovimab.  3. The significant known and potential risks and benefits of Bebtelovimab and the extent to which such risks and benefits are unknown.  4. Information on available alternative treatments and risks and benefits of those alternatives.    The patient's COVID monoclonal antibody injection administration went well without any complications. The patient tolerated the treatment without any reactions. Vitals were stable throughout the injection & post-injection administration. The pt denies any CP, fevers, chills, SOB, numbness/tingling in b/l limbs, loss of sensation or motor function, N/V/D while receiving the injection. Patient denies any symptoms an hour after post injection. Vitals were taken post injection and were stable. Pt is medically stable to be discharged home. Discharge instructions were provided to the patient with a fact sheet included. Patient was instructed to self-isolate and use infection control measures (e.g wear mask, isolate, social distance, avoid sharing personal items, clean and disinfect "high touch" surfaces, and frequent handwashing according to the CDC guidelines. The patient was informed on what symptoms to be aware of for the next couple of days, and if there are any issues to call the 24/7 clinical call center. Patient was instructed to follow up with PCP as needed.

## 2022-05-04 NOTE — MONOCLONAL ANTIBODY INFUSION - EXAM
CC: Monoclonal Antibody Infusion/COVID 19 Positive  81yFemale    exam/findings:  T(C): 36.4 (05-04-22 @ 14:51), Max: 36.4 (05-04-22 @ 14:51)  HR: 58 (05-04-22 @ 14:51) (58 - 58)  BP: 169/80 (05-04-22 @ 14:51) (169/80 - 169/80)  RR: --  SpO2: 95% (05-04-22 @ 14:51) (95% - 95%)      PE:   Appearance: NAD	  HEENT:   Normal oral mucosa,   Lymphatic: No lymphadenopathy  Cardiovascular: Normal S1 S2, No JVD, No murmurs, No edema  Respiratory: Lungs clear to auscultation	  Gastrointestinal:  Soft, Non-tender, + BS	  Skin: warm and dry  Neurologic: Non-focal  Extremities: Normal range of motion,

## 2022-05-10 ENCOUNTER — APPOINTMENT (OUTPATIENT)
Dept: FAMILY MEDICINE | Facility: CLINIC | Age: 82
End: 2022-05-10
Payer: MEDICARE

## 2022-05-10 VITALS
SYSTOLIC BLOOD PRESSURE: 144 MMHG | HEIGHT: 63 IN | DIASTOLIC BLOOD PRESSURE: 82 MMHG | BODY MASS INDEX: 28.35 KG/M2 | OXYGEN SATURATION: 97 % | HEART RATE: 73 BPM | TEMPERATURE: 98 F | WEIGHT: 160 LBS

## 2022-05-10 DIAGNOSIS — H93.8X1 OTHER SPECIFIED DISORDERS OF RIGHT EAR: ICD-10-CM

## 2022-05-10 DIAGNOSIS — R09.81 NASAL CONGESTION: ICD-10-CM

## 2022-05-10 PROCEDURE — 99213 OFFICE O/P EST LOW 20 MIN: CPT

## 2022-05-10 NOTE — HISTORY OF PRESENT ILLNESS
[FreeTextEntry8] : RT ear clogged, hears crackling. Nasal congestion.\par No fever or chills.\par Feels ear clogged with wax. \par \par Chronic diarrhea. \par \par Daughter present at appointment.  [Family Member] : family member

## 2022-05-12 ENCOUNTER — NON-APPOINTMENT (OUTPATIENT)
Age: 82
End: 2022-05-12

## 2022-05-12 VITALS — SYSTOLIC BLOOD PRESSURE: 138 MMHG | DIASTOLIC BLOOD PRESSURE: 70 MMHG

## 2022-05-12 NOTE — DISCUSSION/SUMMARY
[FreeTextEntry1] : Pt is an 80 y/o F PMH non-obstructive CAD with normal LV function, HTN, HLD, ESPERANZA, NIDDM \par \par TTE 6/2017 shows normal LV function with mild TR/AI, min MR. \par TTE 01/2020 EF 55-60%, mild AI/TR, mild DD \par Pharm Nuc stress test 12/2021 small, mild defect in apical ant wall that is reversible\par \par CAD:\par small, mild defects on nuc\par c/w ASA and statin \par recent LDL - 58\par A1c = 6.2\par \par HTN: \par overall well controlled\par c/w indapamide, coreg, quinapril, hydralazine\par Advised low salt diet, regular exercise/activity \par \par DM:\par follows with PCP\par c/w current meds\par goal A1c <7\par Advised lifestyle modifications \par \par Pt will return in 6 mnths or sooner as needed but I encouraged communication via phone or portal if necessary. \par The described plan was discussed with the pt and daughter.  All questions and concerns were addressed to the best of my knowledge.\par \par

## 2022-05-12 NOTE — HISTORY OF PRESENT ILLNESS
[FreeTextEntry1] : Pt is an 82 y/o F who presents for f/u HTN.  She has PMH non-obstructive CAD (cardiac cath about 2011 at [facility], Dr Ramirez, as per pt), HTN, HLD, NIDDM, ESPERANZA, IBS.  \par Pt reports feeling well and has no active cardiac complaints - denies CP, SOB, palpitations, dizziness, syncope, edema, orthopnea, PND, orthopnea.  No exertional symptoms. \par She is accompanied by her daughter\par COVID vaccine 03/2021 PFizer \par \par TTE 6/2017 shows normal LV function with mild TR/AI, min MR. \par TTE 01/2020 EF 55-60%, mild AI/TR, mild DD \par Pharm Nuc stress test 12/2021 small, mild defect in apical ant wall that is reversible\par \par \par \par \par

## 2022-05-12 NOTE — PHYSICAL EXAM
[Well Developed] : well developed [Well Nourished] : well nourished [No Acute Distress] : no acute distress [Normal Conjunctiva] : normal conjunctiva [Normal Venous Pressure] : normal venous pressure [No Carotid Bruit] : no carotid bruit [Normal S1, S2] : normal S1, S2 [No Murmur] : no murmur [No Rub] : no rub [No Gallop] : no gallop [Clear Lung Fields] : clear lung fields [Good Air Entry] : good air entry [No Respiratory Distress] : no respiratory distress  [Soft] : abdomen soft [Non Tender] : non-tender [No Masses/organomegaly] : no masses/organomegaly [Normal Bowel Sounds] : normal bowel sounds [No Cyanosis] : no cyanosis [No Clubbing] : no clubbing [No Varicosities] : no varicosities [No Rash] : no rash [No Skin Lesions] : no skin lesions [Moves all extremities] : moves all extremities [No Focal Deficits] : no focal deficits [Normal Speech] : normal speech [Alert and Oriented] : alert and oriented [Normal memory] : normal memory [de-identified] : ambulates with walker [de-identified] : b/l LE chronic edema

## 2022-06-02 ENCOUNTER — APPOINTMENT (OUTPATIENT)
Dept: CARDIOLOGY | Facility: CLINIC | Age: 82
End: 2022-06-02

## 2022-06-15 ENCOUNTER — APPOINTMENT (OUTPATIENT)
Dept: FAMILY MEDICINE | Facility: CLINIC | Age: 82
End: 2022-06-15
Payer: MEDICARE

## 2022-06-15 VITALS
HEIGHT: 63 IN | SYSTOLIC BLOOD PRESSURE: 140 MMHG | WEIGHT: 159 LBS | OXYGEN SATURATION: 96 % | HEART RATE: 69 BPM | TEMPERATURE: 97 F | BODY MASS INDEX: 28.17 KG/M2 | DIASTOLIC BLOOD PRESSURE: 82 MMHG

## 2022-06-15 DIAGNOSIS — G47.30 SLEEP APNEA, UNSPECIFIED: ICD-10-CM

## 2022-06-15 PROCEDURE — 36415 COLL VENOUS BLD VENIPUNCTURE: CPT

## 2022-06-15 PROCEDURE — 99214 OFFICE O/P EST MOD 30 MIN: CPT | Mod: 25

## 2022-06-15 NOTE — HISTORY OF PRESENT ILLNESS
[Other: _____] : [unfilled] [de-identified] : No new complaints recent stress test showed mild apical ischemia medical management denies chest pain\par \par Continues to have bilateral shoulder pain limiting ability to abduct will check sedimentation rate rule out polymyalgia\par \par Lymphedema remains unchanged\par \par Diabetes controlled with Janumet\par \par Hypertension controlled with current meds

## 2022-06-15 NOTE — PHYSICAL EXAM
[Normal] : normal rate, regular rhythm, normal S1 and S2 and no murmur heard [de-identified] : Bilateral lymphedema [de-identified] : Unable to abduct arms

## 2022-06-15 NOTE — HEALTH RISK ASSESSMENT
[Never] : Never [Assistive Device] : Patient uses an assistive device [PHQ-2 Negative - No further assessment needed] : PHQ-2 Negative - No further assessment needed [de-identified] : Uses a walker

## 2022-06-15 NOTE — ASSESSMENT
[FreeTextEntry1] : Patient doing well medical comanagement of ischemia discussed will see cardiologist if develops any symptoms\par \par Check sed rate for possible PMR

## 2022-06-16 LAB
ALBUMIN SERPL ELPH-MCNC: 4.6 G/DL
ALP BLD-CCNC: 74 U/L
ALT SERPL-CCNC: 12 U/L
ANION GAP SERPL CALC-SCNC: 14 MMOL/L
AST SERPL-CCNC: 17 U/L
BASOPHILS # BLD AUTO: 0.1 K/UL
BASOPHILS NFR BLD AUTO: 1.2 %
BILIRUB SERPL-MCNC: 0.5 MG/DL
BUN SERPL-MCNC: 15 MG/DL
CALCIUM SERPL-MCNC: 9.9 MG/DL
CHLORIDE SERPL-SCNC: 103 MMOL/L
CHOLEST SERPL-MCNC: 115 MG/DL
CO2 SERPL-SCNC: 24 MMOL/L
CREAT SERPL-MCNC: 0.66 MG/DL
EGFR: 86 ML/MIN/1.73M2
EOSINOPHIL # BLD AUTO: 0.41 K/UL
EOSINOPHIL NFR BLD AUTO: 5 %
ERYTHROCYTE [SEDIMENTATION RATE] IN BLOOD BY WESTERGREN METHOD: 6 MM/HR
ESTIMATED AVERAGE GLUCOSE: 120 MG/DL
GLUCOSE SERPL-MCNC: 101 MG/DL
HBA1C MFR BLD HPLC: 5.8 %
HCT VFR BLD CALC: 39.7 %
HDLC SERPL-MCNC: 41 MG/DL
HGB BLD-MCNC: 13.4 G/DL
IMM GRANULOCYTES NFR BLD AUTO: 0.5 %
LDLC SERPL CALC-MCNC: 53 MG/DL
LYMPHOCYTES # BLD AUTO: 2.55 K/UL
LYMPHOCYTES NFR BLD AUTO: 31.1 %
MAN DIFF?: NORMAL
MCHC RBC-ENTMCNC: 31.8 PG
MCHC RBC-ENTMCNC: 33.8 GM/DL
MCV RBC AUTO: 94.3 FL
MONOCYTES # BLD AUTO: 0.61 K/UL
MONOCYTES NFR BLD AUTO: 7.4 %
NEUTROPHILS # BLD AUTO: 4.5 K/UL
NEUTROPHILS NFR BLD AUTO: 54.8 %
NONHDLC SERPL-MCNC: 73 MG/DL
PLATELET # BLD AUTO: 247 K/UL
POTASSIUM SERPL-SCNC: 4.5 MMOL/L
PROT SERPL-MCNC: 6.8 G/DL
RBC # BLD: 4.21 M/UL
RBC # FLD: 14.9 %
SODIUM SERPL-SCNC: 142 MMOL/L
TRIGL SERPL-MCNC: 101 MG/DL
WBC # FLD AUTO: 8.21 K/UL

## 2022-09-15 ENCOUNTER — APPOINTMENT (OUTPATIENT)
Dept: FAMILY MEDICINE | Facility: CLINIC | Age: 82
End: 2022-09-15

## 2022-09-15 VITALS
DIASTOLIC BLOOD PRESSURE: 80 MMHG | OXYGEN SATURATION: 97 % | TEMPERATURE: 97.7 F | WEIGHT: 159 LBS | HEIGHT: 63 IN | HEART RATE: 73 BPM | BODY MASS INDEX: 28.17 KG/M2 | SYSTOLIC BLOOD PRESSURE: 135 MMHG

## 2022-09-15 PROCEDURE — 99213 OFFICE O/P EST LOW 20 MIN: CPT | Mod: 25

## 2022-09-15 PROCEDURE — 90662 IIV NO PRSV INCREASED AG IM: CPT

## 2022-09-15 PROCEDURE — 20610 DRAIN/INJ JOINT/BURSA W/O US: CPT

## 2022-09-15 PROCEDURE — G0008: CPT

## 2022-09-15 RX ORDER — METHYLPRED ACET/NACL,ISO-OS/PF 40 MG/ML
40 VIAL (ML) INJECTION
Qty: 1 | Refills: 0 | Status: COMPLETED | OUTPATIENT
Start: 2022-09-15

## 2022-09-15 RX ADMIN — METHYLPREDNISOLONE ACETATE 0 MG/ML: 40 INJECTION, SUSPENSION INTRA-ARTICULAR; INTRALESIONAL; INTRAMUSCULAR; INTRASYNOVIAL; SOFT TISSUE at 00:00

## 2022-09-15 NOTE — HISTORY OF PRESENT ILLNESS
[FreeTextEntry1] : Bilateral shoulder pain [de-identified] : Right shoulder more painful than left with limited range of motion steroid injection in the past has been helpful patient requests another shot patient has known rotator cuff impingement\par \par Sed rate was normal ruling out PMR\par \par Betadine x3 40 mg Medrol 2 cc Marcaine injected effortlessly with partial pain relief\par \par Referral to physical therapy if symptoms persist will need orthopedist

## 2022-10-18 ENCOUNTER — APPOINTMENT (OUTPATIENT)
Dept: CARDIOLOGY | Facility: CLINIC | Age: 82
End: 2022-10-18

## 2022-10-18 ENCOUNTER — NON-APPOINTMENT (OUTPATIENT)
Age: 82
End: 2022-10-18

## 2022-10-18 VITALS
DIASTOLIC BLOOD PRESSURE: 80 MMHG | WEIGHT: 159 LBS | SYSTOLIC BLOOD PRESSURE: 136 MMHG | HEART RATE: 60 BPM | HEIGHT: 63 IN | BODY MASS INDEX: 28.17 KG/M2

## 2022-10-18 PROCEDURE — 99214 OFFICE O/P EST MOD 30 MIN: CPT | Mod: 25

## 2022-10-18 PROCEDURE — 93000 ELECTROCARDIOGRAM COMPLETE: CPT

## 2022-10-18 NOTE — PHYSICAL EXAM
[Well Developed] : well developed [Well Nourished] : well nourished [No Acute Distress] : no acute distress [Normal Conjunctiva] : normal conjunctiva [Normal Venous Pressure] : normal venous pressure [No Carotid Bruit] : no carotid bruit [Normal S1, S2] : normal S1, S2 [No Murmur] : no murmur [No Rub] : no rub [No Gallop] : no gallop [Clear Lung Fields] : clear lung fields [Good Air Entry] : good air entry [No Respiratory Distress] : no respiratory distress  [Soft] : abdomen soft [Non Tender] : non-tender [No Masses/organomegaly] : no masses/organomegaly [Normal Bowel Sounds] : normal bowel sounds [No Cyanosis] : no cyanosis [No Clubbing] : no clubbing [No Varicosities] : no varicosities [No Rash] : no rash [No Skin Lesions] : no skin lesions [Moves all extremities] : moves all extremities [No Focal Deficits] : no focal deficits [Normal Speech] : normal speech [Alert and Oriented] : alert and oriented [Normal memory] : normal memory [de-identified] : ambulates with walker [de-identified] : b/l LE chronic edema

## 2022-10-18 NOTE — HISTORY OF PRESENT ILLNESS
[FreeTextEntry1] : Pt is an 81 y/o F who presents for f/u HTN.  She has PMH non-obstructive CAD (cardiac cath about 2011 at [facility], Dr Ramirez, as per pt), HTN, HLD, NIDDM, ESPERANZA, IBS.  \par Pt reports feeling well and has no active cardiac complaints - denies CP, SOB, palpitations, dizziness, syncope, edema, orthopnea, PND, orthopnea.  No exertional symptoms. \par Ambulates with walker\par COVID vaccine 03/2021 PFizer \par \par TTE 6/2017 shows normal LV function with mild TR/AI, min MR. \par TTE 01/2020 EF 55-60%, mild AI/TR, mild DD \par Pharm Nuc stress test 12/2021 small, mild defect in apical ant wall that is reversible\par \par \par \par \par

## 2022-10-18 NOTE — DISCUSSION/SUMMARY
[FreeTextEntry1] : Pt is an 81 y/o F PMH non-obstructive CAD with normal LV function, HTN, HLD, ESPERANZA, NIDDM \par \par TTE 6/2017 shows normal LV function with mild TR/AI, min MR. \par TTE 01/2020 EF 55-60%, mild AI/TR, mild DD \par Pharm Nuc stress test 12/2021 small, mild defect in apical ant wall that is reversible\par \par CAD:\par small, mild defects on nuc\par c/w ASA and statin \par recent LDL at goal <70\par A1c = 5.8\par \par HTN: \par overall well controlled\par c/w indapamide, coreg, quinapril, hydralazine\par Advised low salt diet, regular exercise/activity \par \par DM:\par follows with PCP\par c/w current meds\par goal A1c <7\par Advised lifestyle modifications \par \par Pt will return in 6-12 mnths or sooner as needed but I encouraged communication via phone or portal if necessary. \par The described plan was discussed with the pt and daughter.  All questions and concerns were addressed to the best of my knowledge.\par \par

## 2022-11-07 ENCOUNTER — RX RENEWAL (OUTPATIENT)
Age: 82
End: 2022-11-07

## 2022-11-19 ENCOUNTER — OFFICE (OUTPATIENT)
Dept: URBAN - METROPOLITAN AREA CLINIC 12 | Facility: CLINIC | Age: 82
Setting detail: OPHTHALMOLOGY
End: 2022-11-19
Payer: MEDICARE

## 2022-11-19 ENCOUNTER — RX ONLY (RX ONLY)
Age: 82
End: 2022-11-19

## 2022-11-19 DIAGNOSIS — H43.813: ICD-10-CM

## 2022-11-19 DIAGNOSIS — H25.13: ICD-10-CM

## 2022-11-19 DIAGNOSIS — H35.372: ICD-10-CM

## 2022-11-19 DIAGNOSIS — E11.9: ICD-10-CM

## 2022-11-19 DIAGNOSIS — H02.403: ICD-10-CM

## 2022-11-19 PROCEDURE — 99204 OFFICE O/P NEW MOD 45 MIN: CPT | Performed by: OPHTHALMOLOGY

## 2022-11-19 PROCEDURE — 92134 CPTRZ OPH DX IMG PST SGM RTA: CPT | Performed by: OPHTHALMOLOGY

## 2022-11-19 ASSESSMENT — REFRACTION_MANIFEST
OD_AXIS: 088
OD_CYLINDER: -1.50
OS_CYLINDER: -1.00
OD_SPHERE: +2.25
OS_AXIS: 089
OD_VA1: 20/40
OS_SPHERE: +0.50

## 2022-11-19 ASSESSMENT — TONOMETRY
OD_IOP_MMHG: 11
OS_IOP_MMHG: 17

## 2022-11-19 ASSESSMENT — CONFRONTATIONAL VISUAL FIELD TEST (CVF)
OS_FINDINGS: FULL
OD_FINDINGS: FULL

## 2022-11-19 ASSESSMENT — AXIALLENGTH_DERIVED
OS_AL: 23.5461
OD_AL: 22.6348
OS_AL: 23.5461
OD_AL: 22.6348

## 2022-11-19 ASSESSMENT — VISUAL ACUITY
OS_BCVA: 20/50
OD_BCVA: 20/60-

## 2022-11-19 ASSESSMENT — LID POSITION - PTOSIS
OD_PTOSIS: RUL 1+ 2+
OS_PTOSIS: LUL 1+ 2+

## 2022-11-19 ASSESSMENT — KERATOMETRY
OS_AXISANGLE_DEGREES: 180
OS_K1POWER_DIOPTERS: 42.25
OD_K2POWER_DIOPTERS: 45.00
OS_K2POWER_DIOPTERS: 45.00
OD_AXISANGLE_DEGREES: 151
OD_K1POWER_DIOPTERS: 44.25

## 2022-11-19 ASSESSMENT — SPHEQUIV_DERIVED
OD_SPHEQUIV: 1.5
OD_SPHEQUIV: 1.5
OS_SPHEQUIV: 0
OS_SPHEQUIV: 0

## 2022-11-19 ASSESSMENT — REFRACTION_CURRENTRX
OD_VPRISM_DIRECTION: SV
OD_AXIS: 075
OS_VPRISM_DIRECTION: SV
OD_CYLINDER: -1.00
OD_SPHERE: +1.00
OS_OVR_VA: 20/
OD_OVR_VA: 20/
OD_OVR_VA: 20/
OS_OVR_VA: 20/
OS_VPRISM_DIRECTION: SV
OS_SPHERE: +3.50
OS_AXIS: 090
OS_CYLINDER: -1.50
OD_VPRISM_DIRECTION: SV
OD_CYLINDER: -0.75
OD_AXIS: 062
OS_CYLINDER: -1.00
OS_AXIS: 080
OD_SPHERE: +4.25
OS_SPHERE: -0.75

## 2022-11-19 ASSESSMENT — REFRACTION_AUTOREFRACTION
OS_CYLINDER: -1.00
OD_AXIS: 088
OS_SPHERE: +0.50
OD_SPHERE: +2.25
OS_AXIS: 089
OD_CYLINDER: -1.50

## 2022-11-23 ENCOUNTER — TRANSCRIPTION ENCOUNTER (OUTPATIENT)
Age: 82
End: 2022-11-23

## 2022-11-29 ENCOUNTER — RX RENEWAL (OUTPATIENT)
Age: 82
End: 2022-11-29

## 2022-12-15 ENCOUNTER — APPOINTMENT (OUTPATIENT)
Dept: FAMILY MEDICINE | Facility: CLINIC | Age: 82
End: 2022-12-15

## 2022-12-15 VITALS
HEIGHT: 63 IN | OXYGEN SATURATION: 96 % | HEART RATE: 61 BPM | DIASTOLIC BLOOD PRESSURE: 80 MMHG | BODY MASS INDEX: 28.35 KG/M2 | TEMPERATURE: 97.6 F | WEIGHT: 160 LBS | SYSTOLIC BLOOD PRESSURE: 134 MMHG

## 2022-12-15 PROCEDURE — 99214 OFFICE O/P EST MOD 30 MIN: CPT

## 2022-12-15 NOTE — ASSESSMENT
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

## 2022-12-15 NOTE — HISTORY OF PRESENT ILLNESS
[FreeTextEntry1] : Hypertension shoulder pain [de-identified] : Patient has received 2 months of physical therapy for shoulder pain right worse than left pain and limited range of motion remain unchanged refer to orthopedics for possible gel shots\par \par Hypertension well-controlled on current meds recently saw cardiologist\par \par Hypothyroidism on levothyroxine\par \par Diabetes on Janumet\par \par Wheezing uses inhaler once a week

## 2022-12-15 NOTE — HEALTH RISK ASSESSMENT
[Never] : Never [Yes] : Yes [Assistive Device] : Patient uses an assistive device [PHQ-2 Negative - No further assessment needed] : PHQ-2 Negative - No further assessment needed [de-identified] : Occasionally [de-identified] : Uses a walker

## 2022-12-15 NOTE — PHYSICAL EXAM
[Normal] : normal rate, regular rhythm, normal S1 and S2 and no murmur heard [de-identified] : Bilateral brawny edema [de-identified] : Decreased range of motion of both shoulders right worse than left

## 2022-12-17 LAB
ALBUMIN SERPL ELPH-MCNC: 4.4 G/DL
ALP BLD-CCNC: 64 U/L
ALT SERPL-CCNC: 13 U/L
ANION GAP SERPL CALC-SCNC: 12 MMOL/L
AST SERPL-CCNC: 16 U/L
BASOPHILS # BLD AUTO: 0.07 K/UL
BASOPHILS NFR BLD AUTO: 0.5 %
BILIRUB SERPL-MCNC: 0.8 MG/DL
BUN SERPL-MCNC: 18 MG/DL
CALCIUM SERPL-MCNC: 9.8 MG/DL
CHLORIDE SERPL-SCNC: 105 MMOL/L
CHOLEST SERPL-MCNC: 114 MG/DL
CO2 SERPL-SCNC: 25 MMOL/L
COVID-19 NUCLEOCAPSID  GAM ANTIBODY INTERPRETATION: POSITIVE
COVID-19 SPIKE DOMAIN ANTIBODY INTERPRETATION: POSITIVE
CREAT SERPL-MCNC: 0.74 MG/DL
EGFR: 80 ML/MIN/1.73M2
EOSINOPHIL # BLD AUTO: 0.19 K/UL
EOSINOPHIL NFR BLD AUTO: 1.3 %
ESTIMATED AVERAGE GLUCOSE: 128 MG/DL
GLUCOSE SERPL-MCNC: 137 MG/DL
HBA1C MFR BLD HPLC: 6.1 %
HCT VFR BLD CALC: 41.3 %
HDLC SERPL-MCNC: 44 MG/DL
HGB BLD-MCNC: 13.4 G/DL
IMM GRANULOCYTES NFR BLD AUTO: 0.4 %
LDLC SERPL CALC-MCNC: 52 MG/DL
LYMPHOCYTES # BLD AUTO: 1.77 K/UL
LYMPHOCYTES NFR BLD AUTO: 12.5 %
MAN DIFF?: NORMAL
MCHC RBC-ENTMCNC: 31.2 PG
MCHC RBC-ENTMCNC: 32.4 GM/DL
MCV RBC AUTO: 96 FL
MONOCYTES # BLD AUTO: 0.98 K/UL
MONOCYTES NFR BLD AUTO: 6.9 %
NEUTROPHILS # BLD AUTO: 11.09 K/UL
NEUTROPHILS NFR BLD AUTO: 78.4 %
NONHDLC SERPL-MCNC: 70 MG/DL
PLATELET # BLD AUTO: 246 K/UL
POTASSIUM SERPL-SCNC: 4 MMOL/L
PROT SERPL-MCNC: 6.5 G/DL
RBC # BLD: 4.3 M/UL
RBC # FLD: 14.2 %
SARS-COV-2 AB SERPL IA-ACNC: >250 U/ML
SARS-COV-2 AB SERPL QL IA: 4.19 INDEX
SODIUM SERPL-SCNC: 143 MMOL/L
TRIGL SERPL-MCNC: 90 MG/DL
TSH SERPL-ACNC: 1.57 UIU/ML
WBC # FLD AUTO: 14.16 K/UL

## 2023-01-05 ENCOUNTER — APPOINTMENT (OUTPATIENT)
Dept: FAMILY MEDICINE | Facility: CLINIC | Age: 83
End: 2023-01-05

## 2023-01-09 ENCOUNTER — APPOINTMENT (OUTPATIENT)
Dept: ORTHOPEDIC SURGERY | Facility: CLINIC | Age: 83
End: 2023-01-09

## 2023-01-09 ENCOUNTER — APPOINTMENT (OUTPATIENT)
Dept: ORTHOPEDIC SURGERY | Facility: CLINIC | Age: 83
End: 2023-01-09
Payer: MEDICARE

## 2023-01-09 VITALS — BODY MASS INDEX: 28.35 KG/M2 | WEIGHT: 160 LBS | HEIGHT: 63 IN

## 2023-01-09 DIAGNOSIS — M75.52 BURSITIS OF LEFT SHOULDER: ICD-10-CM

## 2023-01-09 DIAGNOSIS — M75.102 UNSPECIFIED ROTATOR CUFF TEAR OR RUPTURE OF LEFT SHOULDER, NOT SPECIFIED AS TRAUMATIC: ICD-10-CM

## 2023-01-09 PROCEDURE — 73030 X-RAY EXAM OF SHOULDER: CPT | Mod: 50

## 2023-01-09 PROCEDURE — 20611 DRAIN/INJ JOINT/BURSA W/US: CPT | Mod: RT

## 2023-01-09 PROCEDURE — 99204 OFFICE O/P NEW MOD 45 MIN: CPT | Mod: 25

## 2023-01-09 NOTE — HISTORY OF PRESENT ILLNESS
[de-identified] : This is a 81 y/o female presenting to the office c/o ongoing bilateral shoulder pain right worse than left. Patient reports there was no specific injury to the shoulder. Pain is described as constant, severe in quality. Currently pain is non-radiating. She has tried Celebrex without significant relief. Pain is worse at night. Overall pain does improve with rest and ice. Patient denies any numbness or tingling.\par

## 2023-01-09 NOTE — DISCUSSION/SUMMARY
[de-identified] : \par \par This is a 81 y/o female presenting to the office c/o ongoing bilateral shoulder pain right worse than left. Patient reports there was no specific injury to the shoulder.\par Bilateral shoulder x-rays were reviewed the patient demonstrating severe GHOA.\par Patient does not wish to proceed operatively at this time.\par She was prescribed a Celebrex for pain relief.\par She received a subacromial injection today for the right shoulder.\par She will follow up as needed\par \par \par (1) We discussed a comprehensive treatment plans that included a prescription management plan involving the use of prescription strength medications to include Ibuprofen 600- 800 mg TID, versus 500- 650 mg Tylenol. We also discussed prescribing topical diclofenac (Voltaren gel) as well as once daily Meloxicam 15 mg.\par \par (2) The patient has More Than One chronic injuries/illnesses as outlined, discussed, and documented by ICD 10 codes listed, as well as the HPI and Plan section.\par \par There is a moderate risk of morbidity with further treatment, especially from use of prescription strength medications and possible side effects of these medications which include upset stomach and cardiac/renal issues with long term use were discussed.\par \par (3) I recommended that the patient follow-up with their medical physician to discuss any significant specific potential issues with long term use such as interactions with current medications or with exacerbation of underlying medical morbidities.\par \par Attestation:\par \par I, Margaux Mercado, attest that this documentation has been prepared under the direction and in the presence of Provider Robert Addison MD.\par \par The documentation recorded by the scribe, in my presence, accurately reflects the service I personally performed, and the decisions made by me with my edits as appropriate.\par \par Robert Addison MD\par

## 2023-01-09 NOTE — PHYSICAL EXAM
[Bilateral] : shoulder bilaterally [Glenohumeral arthritis] : Glenohumeral arthritis [de-identified] : \par \par

## 2023-01-09 NOTE — PROCEDURE
[FreeTextEntry3] : SUBACROMIAL INJECTION\par \par Large Joint Injection was performed because of pain and inflammation.\par \par Anesthesia: ethyl chloride sprayed topically..\par \par Depomedrol: An injection of Depomedrol 40 mg , 1 cc.\par \par Lidocaine: 7 cc.\par \par Medication was injected in the right subacromial space. Patient has tried OTC's including aspirin, Ibuprofen, Aleve etc or prescription NSAIDS, and/or exercises at home and/ or physical therapy without satisfactory response and Patient has decreased mobility in the joint. After verbal consent using sterile preparation and technique. The risks, benefits, and alternatives to cortisone injection were explained in full to the patient. Risks outlined include but are not limited to infection, sepsis, bleeding, scarring, skin discoloration, temporary increase in pain, syncopal episode, failure to resolve symptoms, allergic reaction, symptom recurrence, and elevation of blood sugar in diabetics. Patient understood the risks. All questions were answered. After discussion of options, patient requested an injection. Oral informed consent was obtained and sterile prep was done of the injection site. Sterile technique was utilized for the procedure including the preparation of the solutions used for the injection. Patient tolerated the procedure well. Advised to ice the injection site this evening. Prep with alcohol locally to site. Sterile technique used. Patient tolerated procedure well. Post Procedure Instructions: Patient was advised to call if redness, pain, or fever occur and apply ice for 15 min. out of every hour for the next 12-24 hours as tolerated. patient was advised to rest the joint(s) for 7 days.\par \par Ultrasound Guidance was used for the following reasons: prior failure or difficult injection.\par \par Ultrasound guided injection was performed of the shoulder, visualization of the needle and placement of injection was performed without complication.\par

## 2023-01-11 RX ORDER — FLUTICASONE PROPIONATE AND SALMETEROL 113; 14 UG/1; UG/1
113-14 POWDER, METERED RESPIRATORY (INHALATION) TWICE DAILY
Qty: 1 | Refills: 3 | Status: ACTIVE | COMMUNITY
Start: 2023-01-11 | End: 1900-01-01

## 2023-02-04 ENCOUNTER — APPOINTMENT (OUTPATIENT)
Dept: FAMILY MEDICINE | Facility: CLINIC | Age: 83
End: 2023-02-04
Payer: MEDICARE

## 2023-02-04 ENCOUNTER — OUTPATIENT (OUTPATIENT)
Dept: OUTPATIENT SERVICES | Facility: HOSPITAL | Age: 83
LOS: 1 days | End: 2023-02-04
Payer: MEDICARE

## 2023-02-04 ENCOUNTER — APPOINTMENT (OUTPATIENT)
Dept: ULTRASOUND IMAGING | Facility: CLINIC | Age: 83
End: 2023-02-04
Payer: MEDICARE

## 2023-02-04 VITALS
DIASTOLIC BLOOD PRESSURE: 82 MMHG | HEART RATE: 70 BPM | WEIGHT: 160 LBS | OXYGEN SATURATION: 96 % | TEMPERATURE: 97.2 F | HEIGHT: 63 IN | SYSTOLIC BLOOD PRESSURE: 128 MMHG | BODY MASS INDEX: 28.35 KG/M2

## 2023-02-04 DIAGNOSIS — R29.6 REPEATED FALLS: ICD-10-CM

## 2023-02-04 DIAGNOSIS — M25.562 PAIN IN LEFT KNEE: ICD-10-CM

## 2023-02-04 PROCEDURE — 99214 OFFICE O/P EST MOD 30 MIN: CPT

## 2023-02-04 PROCEDURE — 93971 EXTREMITY STUDY: CPT | Mod: 26,LT

## 2023-02-04 PROCEDURE — 93971 EXTREMITY STUDY: CPT

## 2023-02-04 NOTE — PHYSICAL EXAM
[Normal] : no acute distress, well nourished, well developed and well-appearing [de-identified] : swollen bruise on left lower leg, slight hematoma on left knee cap

## 2023-02-04 NOTE — REVIEW OF SYSTEMS
[Joint Pain] : joint pain [Joint Swelling] : joint swelling [Muscle Pain] : muscle pain [Back Pain] : back pain [Negative] : Constitutional

## 2023-02-04 NOTE — HISTORY OF PRESENT ILLNESS
[FreeTextEntry8] : PT presenting for fall on carpet x 3 weeks ago\par Landed on L  knee which was replaced in 2015. hit left said of face \par Had lots of bruising which spread down the leg \par did not hit head, no LOC no dizziness \par ankles swollen are baseline.

## 2023-03-27 ENCOUNTER — RX RENEWAL (OUTPATIENT)
Age: 83
End: 2023-03-27

## 2023-04-07 ENCOUNTER — APPOINTMENT (OUTPATIENT)
Dept: FAMILY MEDICINE | Facility: CLINIC | Age: 83
End: 2023-04-07
Payer: MEDICARE

## 2023-04-07 VITALS
TEMPERATURE: 97.2 F | WEIGHT: 160 LBS | DIASTOLIC BLOOD PRESSURE: 88 MMHG | OXYGEN SATURATION: 95 % | SYSTOLIC BLOOD PRESSURE: 130 MMHG | HEIGHT: 62 IN | BODY MASS INDEX: 29.44 KG/M2 | HEART RATE: 79 BPM

## 2023-04-07 DIAGNOSIS — K59.1 FUNCTIONAL DIARRHEA: ICD-10-CM

## 2023-04-07 DIAGNOSIS — Z00.00 ENCOUNTER FOR GENERAL ADULT MEDICAL EXAMINATION W/OUT ABNORMAL FINDINGS: ICD-10-CM

## 2023-04-07 PROCEDURE — 99214 OFFICE O/P EST MOD 30 MIN: CPT

## 2023-04-07 RX ORDER — KETOCONAZOLE 20 MG/G
2 CREAM TOPICAL TWICE DAILY
Qty: 1 | Refills: 3 | Status: ACTIVE | COMMUNITY
Start: 2020-07-23 | End: 1900-01-01

## 2023-04-07 RX ORDER — KETOCONAZOLE 20.5 MG/ML
2 SHAMPOO, SUSPENSION TOPICAL
Qty: 1 | Refills: 5 | Status: ACTIVE | COMMUNITY
Start: 2022-01-03 | End: 1900-01-01

## 2023-04-07 NOTE — PHYSICAL EXAM
[Normal] : normal rate, regular rhythm, normal S1 and S2 and no murmur heard [de-identified] : Brawny edema bilaterally left worse than right [de-identified] : Left foot is warm and normal color absent pulses light touch sensation normal

## 2023-04-07 NOTE — HISTORY OF PRESENT ILLNESS
[Other: _____] : [unfilled] [de-identified] : Patient here for checkup she has no new complaints doing well\par \par Irritable bowel syndrome causing occasional diarrhea this is ongoing for years she uses Imodium as needed\par \par Diabetes most recent A1c less than 7\par \par Coronary artery disease nonobstructive no chest pain with exertion\par \par Hypothyroidism recent TSH normal on levothyroxine\par \par Hypertension controlled with lisinopril Hydralazine indapamide quinapril

## 2023-04-11 LAB
BASOPHILS # BLD AUTO: 0.1 K/UL
BASOPHILS NFR BLD AUTO: 1.1 %
EOSINOPHIL # BLD AUTO: 0.23 K/UL
EOSINOPHIL NFR BLD AUTO: 2.5 %
ESTIMATED AVERAGE GLUCOSE: 131 MG/DL
HBA1C MFR BLD HPLC: 6.2 %
HCT VFR BLD CALC: 42.2 %
HGB BLD-MCNC: 13.6 G/DL
IMM GRANULOCYTES NFR BLD AUTO: 0.7 %
LYMPHOCYTES # BLD AUTO: 2.38 K/UL
LYMPHOCYTES NFR BLD AUTO: 25.9 %
MAN DIFF?: NORMAL
MCHC RBC-ENTMCNC: 30.8 PG
MCHC RBC-ENTMCNC: 32.2 GM/DL
MCV RBC AUTO: 95.7 FL
MONOCYTES # BLD AUTO: 0.6 K/UL
MONOCYTES NFR BLD AUTO: 6.5 %
NEUTROPHILS # BLD AUTO: 5.82 K/UL
NEUTROPHILS NFR BLD AUTO: 63.3 %
PLATELET # BLD AUTO: 254 K/UL
RBC # BLD: 4.41 M/UL
RBC # FLD: 14.5 %
WBC # FLD AUTO: 9.19 K/UL

## 2023-06-21 RX ORDER — FLUOCINONIDE 0.5 MG/ML
0.05 SOLUTION TOPICAL
Qty: 1 | Refills: 3 | Status: ACTIVE | COMMUNITY
Start: 2020-07-23 | End: 1900-01-01

## 2023-08-10 ENCOUNTER — EMERGENCY (EMERGENCY)
Facility: HOSPITAL | Age: 83
LOS: 0 days | Discharge: HOME | End: 2023-08-10
Attending: STUDENT IN AN ORGANIZED HEALTH CARE EDUCATION/TRAINING PROGRAM
Payer: MEDICARE

## 2023-08-10 VITALS
DIASTOLIC BLOOD PRESSURE: 75 MMHG | OXYGEN SATURATION: 98 % | SYSTOLIC BLOOD PRESSURE: 163 MMHG | TEMPERATURE: 99 F | RESPIRATION RATE: 17 BRPM | HEART RATE: 70 BPM

## 2023-08-10 VITALS — WEIGHT: 154.1 LBS | HEIGHT: 62 IN

## 2023-08-10 DIAGNOSIS — E78.5 HYPERLIPIDEMIA, UNSPECIFIED: ICD-10-CM

## 2023-08-10 DIAGNOSIS — R11.10 VOMITING, UNSPECIFIED: ICD-10-CM

## 2023-08-10 DIAGNOSIS — R53.1 WEAKNESS: ICD-10-CM

## 2023-08-10 DIAGNOSIS — I49.1 ATRIAL PREMATURE DEPOLARIZATION: ICD-10-CM

## 2023-08-10 DIAGNOSIS — Z20.822 CONTACT WITH AND (SUSPECTED) EXPOSURE TO COVID-19: ICD-10-CM

## 2023-08-10 DIAGNOSIS — I10 ESSENTIAL (PRIMARY) HYPERTENSION: ICD-10-CM

## 2023-08-10 DIAGNOSIS — M79.10 MYALGIA, UNSPECIFIED SITE: ICD-10-CM

## 2023-08-10 DIAGNOSIS — E11.9 TYPE 2 DIABETES MELLITUS WITHOUT COMPLICATIONS: ICD-10-CM

## 2023-08-10 DIAGNOSIS — Z88.1 ALLERGY STATUS TO OTHER ANTIBIOTIC AGENTS STATUS: ICD-10-CM

## 2023-08-10 DIAGNOSIS — N39.0 URINARY TRACT INFECTION, SITE NOT SPECIFIED: ICD-10-CM

## 2023-08-10 LAB
ALBUMIN SERPL ELPH-MCNC: 3.6 G/DL — SIGNIFICANT CHANGE UP (ref 3.3–5)
ALP SERPL-CCNC: 65 U/L — SIGNIFICANT CHANGE UP (ref 40–120)
ALT FLD-CCNC: 16 U/L — SIGNIFICANT CHANGE UP (ref 12–78)
ANION GAP SERPL CALC-SCNC: 6 MMOL/L — SIGNIFICANT CHANGE UP (ref 5–17)
APPEARANCE UR: CLEAR — SIGNIFICANT CHANGE UP
APTT BLD: 32.1 SEC — SIGNIFICANT CHANGE UP (ref 24.5–35.6)
AST SERPL-CCNC: 12 U/L — LOW (ref 15–37)
BACTERIA # UR AUTO: ABNORMAL
BASOPHILS # BLD AUTO: 0.05 K/UL — SIGNIFICANT CHANGE UP (ref 0–0.2)
BASOPHILS NFR BLD AUTO: 0.4 % — SIGNIFICANT CHANGE UP (ref 0–2)
BILIRUB SERPL-MCNC: 1.2 MG/DL — SIGNIFICANT CHANGE UP (ref 0.2–1.2)
BILIRUB UR-MCNC: NEGATIVE — SIGNIFICANT CHANGE UP
BUN SERPL-MCNC: 20 MG/DL — SIGNIFICANT CHANGE UP (ref 7–23)
CALCIUM SERPL-MCNC: 9.4 MG/DL — SIGNIFICANT CHANGE UP (ref 8.5–10.1)
CHLORIDE SERPL-SCNC: 104 MMOL/L — SIGNIFICANT CHANGE UP (ref 96–108)
CO2 SERPL-SCNC: 26 MMOL/L — SIGNIFICANT CHANGE UP (ref 22–31)
COLOR SPEC: YELLOW — SIGNIFICANT CHANGE UP
CREAT SERPL-MCNC: 0.79 MG/DL — SIGNIFICANT CHANGE UP (ref 0.5–1.3)
DIFF PNL FLD: ABNORMAL
EGFR: 73 ML/MIN/1.73M2 — SIGNIFICANT CHANGE UP
EOSINOPHIL # BLD AUTO: 0.01 K/UL — SIGNIFICANT CHANGE UP (ref 0–0.5)
EOSINOPHIL NFR BLD AUTO: 0.1 % — SIGNIFICANT CHANGE UP (ref 0–6)
EPI CELLS # UR: SIGNIFICANT CHANGE UP
GLUCOSE SERPL-MCNC: 147 MG/DL — HIGH (ref 70–99)
GLUCOSE UR QL: NEGATIVE — SIGNIFICANT CHANGE UP
HCT VFR BLD CALC: 39.7 % — SIGNIFICANT CHANGE UP (ref 34.5–45)
HGB BLD-MCNC: 13.5 G/DL — SIGNIFICANT CHANGE UP (ref 11.5–15.5)
IMM GRANULOCYTES NFR BLD AUTO: 0.7 % — SIGNIFICANT CHANGE UP (ref 0–0.9)
INR BLD: 1.16 RATIO — SIGNIFICANT CHANGE UP (ref 0.85–1.18)
KETONES UR-MCNC: NEGATIVE — SIGNIFICANT CHANGE UP
LACTATE SERPL-SCNC: 1.3 MMOL/L — SIGNIFICANT CHANGE UP (ref 0.7–2)
LEUKOCYTE ESTERASE UR-ACNC: ABNORMAL
LYMPHOCYTES # BLD AUTO: 1.31 K/UL — SIGNIFICANT CHANGE UP (ref 1–3.3)
LYMPHOCYTES # BLD AUTO: 10.9 % — LOW (ref 13–44)
MAGNESIUM SERPL-MCNC: 1.3 MG/DL — LOW (ref 1.6–2.6)
MCHC RBC-ENTMCNC: 31.3 PG — SIGNIFICANT CHANGE UP (ref 27–34)
MCHC RBC-ENTMCNC: 34 GM/DL — SIGNIFICANT CHANGE UP (ref 32–36)
MCV RBC AUTO: 92.1 FL — SIGNIFICANT CHANGE UP (ref 80–100)
MONOCYTES # BLD AUTO: 0.7 K/UL — SIGNIFICANT CHANGE UP (ref 0–0.9)
MONOCYTES NFR BLD AUTO: 5.8 % — SIGNIFICANT CHANGE UP (ref 2–14)
NEUTROPHILS # BLD AUTO: 9.88 K/UL — HIGH (ref 1.8–7.4)
NEUTROPHILS NFR BLD AUTO: 82.1 % — HIGH (ref 43–77)
NITRITE UR-MCNC: NEGATIVE — SIGNIFICANT CHANGE UP
PH UR: 5 — SIGNIFICANT CHANGE UP (ref 5–8)
PLATELET # BLD AUTO: 239 K/UL — SIGNIFICANT CHANGE UP (ref 150–400)
POTASSIUM SERPL-MCNC: 4 MMOL/L — SIGNIFICANT CHANGE UP (ref 3.5–5.3)
POTASSIUM SERPL-SCNC: 4 MMOL/L — SIGNIFICANT CHANGE UP (ref 3.5–5.3)
PROT SERPL-MCNC: 7.4 GM/DL — SIGNIFICANT CHANGE UP (ref 6–8.3)
PROT UR-MCNC: NEGATIVE — SIGNIFICANT CHANGE UP
PROTHROM AB SERPL-ACNC: 13 SEC — SIGNIFICANT CHANGE UP (ref 9.5–13)
RAPID RVP RESULT: SIGNIFICANT CHANGE UP
RBC # BLD: 4.31 M/UL — SIGNIFICANT CHANGE UP (ref 3.8–5.2)
RBC # FLD: 13.9 % — SIGNIFICANT CHANGE UP (ref 10.3–14.5)
RBC CASTS # UR COMP ASSIST: ABNORMAL /HPF (ref 0–4)
SARS-COV-2 RNA SPEC QL NAA+PROBE: SIGNIFICANT CHANGE UP
SODIUM SERPL-SCNC: 136 MMOL/L — SIGNIFICANT CHANGE UP (ref 135–145)
SP GR SPEC: 1.01 — SIGNIFICANT CHANGE UP (ref 1.01–1.02)
TROPONIN I, HIGH SENSITIVITY RESULT: 7.04 NG/L — SIGNIFICANT CHANGE UP
UROBILINOGEN FLD QL: NEGATIVE — SIGNIFICANT CHANGE UP
WBC # BLD: 12.03 K/UL — HIGH (ref 3.8–10.5)
WBC # FLD AUTO: 12.03 K/UL — HIGH (ref 3.8–10.5)
WBC UR QL: ABNORMAL /HPF (ref 0–5)

## 2023-08-10 PROCEDURE — 99285 EMERGENCY DEPT VISIT HI MDM: CPT | Mod: 25

## 2023-08-10 PROCEDURE — 93010 ELECTROCARDIOGRAM REPORT: CPT

## 2023-08-10 PROCEDURE — 85025 COMPLETE CBC W/AUTO DIFF WBC: CPT

## 2023-08-10 PROCEDURE — 71046 X-RAY EXAM CHEST 2 VIEWS: CPT | Mod: 26

## 2023-08-10 PROCEDURE — 87186 SC STD MICRODIL/AGAR DIL: CPT

## 2023-08-10 PROCEDURE — 84484 ASSAY OF TROPONIN QUANT: CPT

## 2023-08-10 PROCEDURE — 85730 THROMBOPLASTIN TIME PARTIAL: CPT

## 2023-08-10 PROCEDURE — 80053 COMPREHEN METABOLIC PANEL: CPT

## 2023-08-10 PROCEDURE — 71046 X-RAY EXAM CHEST 2 VIEWS: CPT

## 2023-08-10 PROCEDURE — 36415 COLL VENOUS BLD VENIPUNCTURE: CPT

## 2023-08-10 PROCEDURE — 93005 ELECTROCARDIOGRAM TRACING: CPT

## 2023-08-10 PROCEDURE — 83735 ASSAY OF MAGNESIUM: CPT

## 2023-08-10 PROCEDURE — 87086 URINE CULTURE/COLONY COUNT: CPT

## 2023-08-10 PROCEDURE — 0225U NFCT DS DNA&RNA 21 SARSCOV2: CPT

## 2023-08-10 PROCEDURE — 81001 URINALYSIS AUTO W/SCOPE: CPT

## 2023-08-10 PROCEDURE — 85610 PROTHROMBIN TIME: CPT

## 2023-08-10 PROCEDURE — 96374 THER/PROPH/DIAG INJ IV PUSH: CPT

## 2023-08-10 PROCEDURE — 83605 ASSAY OF LACTIC ACID: CPT

## 2023-08-10 PROCEDURE — 99285 EMERGENCY DEPT VISIT HI MDM: CPT | Mod: FS

## 2023-08-10 PROCEDURE — 87040 BLOOD CULTURE FOR BACTERIA: CPT

## 2023-08-10 PROCEDURE — 96375 TX/PRO/DX INJ NEW DRUG ADDON: CPT

## 2023-08-10 RX ORDER — ONDANSETRON 8 MG/1
4 TABLET, FILM COATED ORAL ONCE
Refills: 0 | Status: COMPLETED | OUTPATIENT
Start: 2023-08-10 | End: 2023-08-10

## 2023-08-10 RX ORDER — CEFTRIAXONE 500 MG/1
1000 INJECTION, POWDER, FOR SOLUTION INTRAMUSCULAR; INTRAVENOUS ONCE
Refills: 0 | Status: COMPLETED | OUTPATIENT
Start: 2023-08-10 | End: 2023-08-10

## 2023-08-10 RX ORDER — CEPHALEXIN 500 MG
1 CAPSULE ORAL
Qty: 21 | Refills: 0
Start: 2023-08-10

## 2023-08-10 RX ADMIN — CEFTRIAXONE 1000 MILLIGRAM(S): 500 INJECTION, POWDER, FOR SOLUTION INTRAMUSCULAR; INTRAVENOUS at 16:20

## 2023-08-10 RX ADMIN — ONDANSETRON 4 MILLIGRAM(S): 8 TABLET, FILM COATED ORAL at 16:40

## 2023-08-10 NOTE — ED STATDOCS - PHYSICAL EXAMINATION
Constitutional: Awake, Alert, non-toxic. No acute distress.  HEAD: Normocephalic, atraumatic.   EYES: PERRL, EOM intact, conjunctiva and sclera are clear bilaterally.  ENT: External ears normal. No rhinorrhea, no tracheal deviation   NECK: Supple, non-tender  CARDIOVASCULAR: regular rate and rhythm.  RESPIRATORY: Normal respiratory effort; breath sounds CTAB, no wheezes, rhonchi, or rales. Speaking in full sentences. No accessory muscle use.   ABDOMEN: Soft; non-tender, non-distended. No rebound or guarding.   MSK:  no lower extremity edema, no deformities. Generalized upper back TTP  SKIN: Warm, dry  NEURO: A&O x3. Sensory and motor functions are grossly intact. Speech is normal. No facial droop.  PSYCH: Appearance and judgement seem appropriate for gender and age. Attending: Constitutional: Awake, Alert, non-toxic. No acute distress.  HEAD: Normocephalic, atraumatic.   EYES: PERRL, EOM intact, conjunctiva and sclera are clear bilaterally.  ENT: External ears normal. No rhinorrhea, no tracheal deviation   NECK: Supple, non-tender  CARDIOVASCULAR: regular rate and rhythm.  RESPIRATORY: Normal respiratory effort; breath sounds CTAB, no wheezes, rhonchi, or rales. Speaking in full sentences. No accessory muscle use.   ABDOMEN: Soft; non-tender, non-distended. No rebound or guarding.   MSK:  no lower extremity edema, no deformities. Generalized upper back TTP  SKIN: Warm, dry  NEURO: A&O x3. Sensory and motor functions are grossly intact. Speech is normal. No facial droop.  PSYCH: Appearance and judgement seem appropriate for gender and age.

## 2023-08-10 NOTE — ED STATDOCS - PATIENT PORTAL LINK FT
You can access the FollowMyHealth Patient Portal offered by [facility] by registering at the following website: [email or web address]. By joining [employer]’s FollowMyHealth portal, you will also be able to view your health information using other applications (apps) compatible with our system.

## 2023-08-10 NOTE — ED STATDOCS - NSICDXPASTMEDICALHX_GEN_ALL_CORE_FT
PAST MEDICAL HISTORY:  DM (diabetes mellitus)     HLD (hyperlipidemia)     HTN (hypertension)     Rotator cuff tear

## 2023-08-10 NOTE — ED STATDOCS - PROGRESS NOTE DETAILS
PA: Patient is an 82 y/o female with PMHx of DM, HTN, HLD, rotator cuff tear who presents to  [facility] c/o generalized weakness, achiness and vomiting x1. Daughter tested pt for COVID last night, resulting negative. Pt took a muscle relaxer at home. Pt with mild burning with urination. No other complaints at this time. PCP: Dr. Patel. Cardio: Dr. Mccartney. PA note: All labwork/radiology results discussed in detail with patient. Patient re-examined and re-evaluated. Patient feels much better at this time. ED evaluation, Diagnosis and management discussed with the patient in detail. Workup results discussed with ED attending, OK to VT home. Close PMD follow up encouraged, aftercare to assist with scheduling appointment ASAP. Strict ED return instructions discussed in detail and patient given the opportunity to ask any questions about their discharge diagnosis and instructions. Patient verbalized understanding. ~ Júnior Garcia PA-C

## 2023-08-10 NOTE — ED ADULT TRIAGE NOTE - CHIEF COMPLAINT QUOTE
Pt presents to ED c/o "not feeling well, generally achy, malaise, nauseous and burning with urination. I have essential hematuria as well. I threw up my morning tea this morning. I just don't feel too well." Denies use of blood thinner.

## 2023-08-10 NOTE — ED STATDOCS - CLINICAL SUMMARY MEDICAL DECISION MAKING FREE TEXT BOX
Likely MSK pain with hx of similar though given aching will check labs and swab for COVID. Do not suspect ACS though with age and risk factors, will check troponin. Possible UTI with dysuria. Likely MSK pain with hx of similar though given aching will check labs and swab for COVID. Do not suspect ACS though with age and risk factors, will check troponin. Possible UTI with dysuria. Pt is well appearing here. Likely MSK pain with hx of similar though given aching will check labs and swab for COVID. Do not suspect ACS though with age and risk factors, will check troponin. Possible UTI with dysuria. Pt is well appearing here.        PA note: All labwork/radiology results discussed in detail with patient. Patient re-examined and re-evaluated. Patient feels much better at this time. ED evaluation, Diagnosis and management discussed with the patient in detail. Workup results discussed with ED attending, OK to dc home. Close PMD follow up encouraged, aftercare to assist with scheduling appointment ASAP. Strict ED return instructions discussed in detail and patient given the opportunity to ask any questions about their discharge diagnosis and instructions. Patient verbalized understanding. ~ Júnior Garcia PA-C

## 2023-08-10 NOTE — ED STATDOCS - ATTENDING APP SHARED VISIT CONTRIBUTION OF CARE
I, Osbaldo Sandy MD,  performed the initial face to face bedside interview with this patient regarding history of present illness, review of symptoms and relevant past medical, social and family history.  I completed an independent physical examination.  I was the initial provider who evaluated this patient.   I personally saw the patient and performed a substantive portion of the visit including all aspects of the medical decision making.  I have signed out the follow up of any pending tests (i.e. labs, radiological studies) to the [facility].  I have communicated the patient’s plan of care and disposition with the [facility].  The history, relevant review of systems, past medical and surgical history, medical decision making, and physical examination was documented by the scribe in my presence and I attest to the accuracy of the documentation.

## 2023-08-10 NOTE — ED STATDOCS - OBJECTIVE STATEMENT
82 y/o female with a PMHx of DM, HTN, HLD, rotator cuff tear presents to the ED c/o generalized weakness, achiness and vomiting. Daughter tested pt for COVID last night, resulting negative. Pt took a muscle relaxer at home. Pt with mild burning with urination. No other complaints at this time. PCP: Dr. Patel. Cardio: Dr. Mccartney. 82 y/o female with a PMHx of DM, HTN, HLD, rotator cuff tear presents to the ED c/o generalized weakness, achiness and vomiting x1. Daughter tested pt for COVID last night, resulting negative. Pt took a muscle relaxer at home. Pt with mild burning with urination. No other complaints at this time. PCP: Dr. Patel. Cardio: Dr. Mccartney.

## 2023-08-15 LAB
CULTURE RESULTS: SIGNIFICANT CHANGE UP
CULTURE RESULTS: SIGNIFICANT CHANGE UP
SPECIMEN SOURCE: SIGNIFICANT CHANGE UP
SPECIMEN SOURCE: SIGNIFICANT CHANGE UP

## 2023-08-16 ENCOUNTER — RX RENEWAL (OUTPATIENT)
Age: 83
End: 2023-08-16

## 2023-09-18 PROBLEM — E78.5 HYPERLIPIDEMIA, UNSPECIFIED: Chronic | Status: ACTIVE | Noted: 2023-08-10

## 2023-09-18 PROBLEM — M75.100 UNSPECIFIED ROTATOR CUFF TEAR OR RUPTURE OF UNSPECIFIED SHOULDER, NOT SPECIFIED AS TRAUMATIC: Chronic | Status: ACTIVE | Noted: 2023-08-10

## 2023-09-18 PROBLEM — I10 ESSENTIAL (PRIMARY) HYPERTENSION: Chronic | Status: ACTIVE | Noted: 2023-08-10

## 2023-10-04 RX ORDER — DICYCLOMINE HYDROCHLORIDE 20 MG/1
20 TABLET ORAL
Qty: 90 | Refills: 3 | Status: ACTIVE | COMMUNITY
Start: 2020-07-23

## 2023-10-11 ENCOUNTER — NON-APPOINTMENT (OUTPATIENT)
Age: 83
End: 2023-10-11

## 2023-10-12 ENCOUNTER — APPOINTMENT (OUTPATIENT)
Dept: FAMILY MEDICINE | Facility: CLINIC | Age: 83
End: 2023-10-12
Payer: MEDICARE

## 2023-10-12 VITALS
SYSTOLIC BLOOD PRESSURE: 110 MMHG | HEART RATE: 70 BPM | HEIGHT: 62 IN | WEIGHT: 159 LBS | OXYGEN SATURATION: 97 % | DIASTOLIC BLOOD PRESSURE: 70 MMHG | TEMPERATURE: 97.8 F | BODY MASS INDEX: 29.26 KG/M2

## 2023-10-12 DIAGNOSIS — R92.8 OTHER ABNORMAL AND INCONCLUSIVE FINDINGS ON DIAGNOSTIC IMAGING OF BREAST: ICD-10-CM

## 2023-10-12 DIAGNOSIS — M25.511 PAIN IN RIGHT SHOULDER: ICD-10-CM

## 2023-10-12 DIAGNOSIS — M75.101 UNSPECIFIED ROTATOR CUFF TEAR OR RUPTURE OF RIGHT SHOULDER, NOT SPECIFIED AS TRAUMATIC: ICD-10-CM

## 2023-10-12 DIAGNOSIS — N60.19 DIFFUSE CYSTIC MASTOPATHY OF UNSPECIFIED BREAST: ICD-10-CM

## 2023-10-12 PROCEDURE — 90662 IIV NO PRSV INCREASED AG IM: CPT

## 2023-10-12 PROCEDURE — G0008: CPT

## 2023-10-12 PROCEDURE — 99214 OFFICE O/P EST MOD 30 MIN: CPT | Mod: 25

## 2023-10-12 PROCEDURE — 96372 THER/PROPH/DIAG INJ SC/IM: CPT

## 2023-10-12 RX ORDER — METHYLPRED ACET/NACL,ISO-OS/PF 40 MG/ML
40 VIAL (ML) INJECTION
Qty: 1 | Refills: 0 | Status: COMPLETED | OUTPATIENT
Start: 2023-10-12

## 2023-10-12 RX ADMIN — METHYLPREDNISOLONE ACETATE 0 MG/ML: 40 INJECTION, SUSPENSION INTRA-ARTICULAR; INTRALESIONAL; INTRAMUSCULAR; SOFT TISSUE at 00:00

## 2023-10-14 LAB
ALBUMIN SERPL ELPH-MCNC: 4.5 G/DL
ALP BLD-CCNC: 75 U/L
ALT SERPL-CCNC: 14 U/L
ANION GAP SERPL CALC-SCNC: 17 MMOL/L
AST SERPL-CCNC: 21 U/L
BASOPHILS # BLD AUTO: 0.05 K/UL
BASOPHILS NFR BLD AUTO: 0.6 %
BILIRUB SERPL-MCNC: 0.5 MG/DL
BUN SERPL-MCNC: 21 MG/DL
CALCIUM SERPL-MCNC: 10.2 MG/DL
CHLORIDE SERPL-SCNC: 104 MMOL/L
CO2 SERPL-SCNC: 22 MMOL/L
CREAT SERPL-MCNC: 0.69 MG/DL
EGFR: 85 ML/MIN/1.73M2
EOSINOPHIL # BLD AUTO: 0.25 K/UL
EOSINOPHIL NFR BLD AUTO: 2.8 %
ESTIMATED AVERAGE GLUCOSE: 134 MG/DL
GLUCOSE SERPL-MCNC: 133 MG/DL
HBA1C MFR BLD HPLC: 6.3 %
HCT VFR BLD CALC: 41.2 %
HGB BLD-MCNC: 13.2 G/DL
IMM GRANULOCYTES NFR BLD AUTO: 0.6 %
LYMPHOCYTES # BLD AUTO: 2.35 K/UL
LYMPHOCYTES NFR BLD AUTO: 26.7 %
MAN DIFF?: NORMAL
MCHC RBC-ENTMCNC: 30.1 PG
MCHC RBC-ENTMCNC: 32 GM/DL
MCV RBC AUTO: 94.1 FL
MONOCYTES # BLD AUTO: 0.58 K/UL
MONOCYTES NFR BLD AUTO: 6.6 %
NEUTROPHILS # BLD AUTO: 5.51 K/UL
NEUTROPHILS NFR BLD AUTO: 62.7 %
PLATELET # BLD AUTO: 312 K/UL
POTASSIUM SERPL-SCNC: 4.5 MMOL/L
PROT SERPL-MCNC: 6.6 G/DL
RBC # BLD: 4.38 M/UL
RBC # FLD: 14.6 %
SODIUM SERPL-SCNC: 143 MMOL/L
WBC # FLD AUTO: 8.79 K/UL

## 2023-10-19 ENCOUNTER — APPOINTMENT (OUTPATIENT)
Dept: CARDIOLOGY | Facility: CLINIC | Age: 83
End: 2023-10-19
Payer: MEDICARE

## 2023-10-19 ENCOUNTER — NON-APPOINTMENT (OUTPATIENT)
Age: 83
End: 2023-10-19

## 2023-10-19 VITALS
HEART RATE: 60 BPM | BODY MASS INDEX: 28.71 KG/M2 | OXYGEN SATURATION: 96 % | DIASTOLIC BLOOD PRESSURE: 80 MMHG | HEIGHT: 62 IN | SYSTOLIC BLOOD PRESSURE: 144 MMHG | WEIGHT: 156 LBS

## 2023-10-19 PROCEDURE — 93000 ELECTROCARDIOGRAM COMPLETE: CPT

## 2023-10-19 PROCEDURE — 99214 OFFICE O/P EST MOD 30 MIN: CPT | Mod: 25

## 2023-10-19 RX ORDER — INDAPAMIDE 1.25 MG/1
1.25 TABLET, FILM COATED ORAL
Qty: 90 | Refills: 3 | Status: DISCONTINUED | COMMUNITY
Start: 2019-10-28 | End: 2023-10-19

## 2023-10-24 NOTE — ASSESSMENT
Change back to Toprol-XL as blood pressure is now improved       [FreeTextEntry1] : s.p fall \par doing well \par check US to r/o clots\par cont ice and compression stocking and elevation \par given fall guidance and advice \par cont to monitor

## 2023-10-27 ENCOUNTER — RX RENEWAL (OUTPATIENT)
Age: 83
End: 2023-10-27

## 2023-11-26 ENCOUNTER — RX RENEWAL (OUTPATIENT)
Age: 83
End: 2023-11-26

## 2023-12-13 ENCOUNTER — APPOINTMENT (OUTPATIENT)
Dept: COLORECTAL SURGERY | Facility: CLINIC | Age: 83
End: 2023-12-13

## 2023-12-27 ENCOUNTER — RX RENEWAL (OUTPATIENT)
Age: 83
End: 2023-12-27

## 2023-12-30 ENCOUNTER — APPOINTMENT (OUTPATIENT)
Dept: RADIOLOGY | Facility: CLINIC | Age: 83
End: 2023-12-30
Payer: MEDICARE

## 2023-12-30 ENCOUNTER — RESULT REVIEW (OUTPATIENT)
Age: 83
End: 2023-12-30

## 2023-12-30 ENCOUNTER — APPOINTMENT (OUTPATIENT)
Dept: FAMILY MEDICINE | Facility: CLINIC | Age: 83
End: 2023-12-30
Payer: MEDICARE

## 2023-12-30 ENCOUNTER — OUTPATIENT (OUTPATIENT)
Dept: OUTPATIENT SERVICES | Facility: HOSPITAL | Age: 83
LOS: 1 days | End: 2023-12-30
Payer: MEDICARE

## 2023-12-30 VITALS
HEIGHT: 63 IN | OXYGEN SATURATION: 97 % | SYSTOLIC BLOOD PRESSURE: 126 MMHG | BODY MASS INDEX: 28.17 KG/M2 | HEART RATE: 72 BPM | WEIGHT: 159 LBS | DIASTOLIC BLOOD PRESSURE: 78 MMHG | TEMPERATURE: 97 F

## 2023-12-30 DIAGNOSIS — M25.562 PAIN IN LEFT KNEE: ICD-10-CM

## 2023-12-30 PROCEDURE — 99213 OFFICE O/P EST LOW 20 MIN: CPT

## 2023-12-30 PROCEDURE — 73502 X-RAY EXAM HIP UNI 2-3 VIEWS: CPT | Mod: 26,LT

## 2023-12-30 PROCEDURE — 73562 X-RAY EXAM OF KNEE 3: CPT | Mod: 26,LT

## 2023-12-30 PROCEDURE — 73562 X-RAY EXAM OF KNEE 3: CPT

## 2023-12-30 PROCEDURE — 73502 X-RAY EXAM HIP UNI 2-3 VIEWS: CPT

## 2023-12-30 NOTE — HISTORY OF PRESENT ILLNESS
[FreeTextEntry8] :  83 year old female presents alongside her son c/o worsening pain to her left hip and left knee over the past 10 days she has h/o left knee replacement in 2015 no recent injury or trauma, did have 2 falls back in May 2023  has been taking Tylenol with mild relief

## 2023-12-30 NOTE — ASSESSMENT
[FreeTextEntry1] : referred for x-rays advised to see orthopedist, referral placed  c/w Tylenol for now for pain

## 2023-12-30 NOTE — PHYSICAL EXAM
[No Acute Distress] : no acute distress [Well Nourished] : well nourished [Well Developed] : well developed [Normal Appearance] : was normal in appearance [Neck Supple] : was supple [No Respiratory Distress] : no respiratory distress  [No Accessory Muscle Use] : no accessory muscle use [Alert and Oriented x3] : oriented to person, place, and time [de-identified] : + tenderness to palpation to left hip

## 2024-01-23 RX ORDER — CLOTRIMAZOLE AND BETAMETHASONE DIPROPIONATE 10; .5 MG/G; MG/G
1-0.05 CREAM TOPICAL
Qty: 45 | Refills: 0 | Status: ACTIVE | COMMUNITY
Start: 2022-01-28

## 2024-01-23 RX ORDER — CELECOXIB 200 MG/1
200 CAPSULE ORAL DAILY
Qty: 90 | Refills: 1 | Status: ACTIVE | COMMUNITY
Start: 2023-01-09

## 2024-01-30 ENCOUNTER — APPOINTMENT (OUTPATIENT)
Dept: ORTHOPEDIC SURGERY | Facility: CLINIC | Age: 84
End: 2024-01-30
Payer: MEDICARE

## 2024-01-30 VITALS
WEIGHT: 159 LBS | HEIGHT: 63 IN | BODY MASS INDEX: 28.17 KG/M2 | HEART RATE: 61 BPM | SYSTOLIC BLOOD PRESSURE: 108 MMHG | DIASTOLIC BLOOD PRESSURE: 69 MMHG

## 2024-01-30 DIAGNOSIS — M25.552 PAIN IN LEFT HIP: ICD-10-CM

## 2024-01-30 PROCEDURE — 99204 OFFICE O/P NEW MOD 45 MIN: CPT

## 2024-01-30 NOTE — HISTORY OF PRESENT ILLNESS
[de-identified] : Ms. VALENTIN ROCHE is a 83 year old female presenting for left hip and left knee pain. She is status post left TKR with Dr Vides in 2015. She has a history of multiple falls recently. She is now having pain and stiffness in the left knee as well as clicking. She was seen by Ranjeet one month ago and was found to have tibial component loosening as per the radiologist. She is also having left hip pain localized to the left groin. Patient notes the pain is worse with all weightbearing activity. She has not had any injections. She has tried PT and Tylenol without improvement.  PMHx: Varicose veins, DM with A1C 6.something

## 2024-01-30 NOTE — PHYSICAL EXAM
[de-identified] : The patient appears well nourished and in no apparent distress. The patient is alert and oriented to person, place, and time. Affect and mood appear normal. The head is normocephalic and atraumatic. The eyes reveal normal sclera and extra ocular muscles are intact. The tongue is midline with no apparent lesions. Skin shows normal turgor with no evidence of eczema or psoriasis. No respiratory distress noted. Sensation grossly intact.	  [de-identified] : Exam of the left knee shows a well healed incision, full knee extension.  There is no instability.  Exam of the left hip shows a hip flexion contracture, hip external rotation of 20 degrees, hip internal rotation of 0 degrees. 5/5 motor strength bilaterally distally. Sensation intact distally.		  [de-identified] : X-ray: 3 views of the left knee demonstrate a total knee replacement in good alignment with a well centered patella, without evidence of loosening or subsidence, and no fracture.		  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

## 2024-01-30 NOTE — DISCUSSION/SUMMARY
[de-identified] : VALENTIN ROCHE is a 83 year old female who presents with left knee and hip pain s/p left total knee replacement. Left hip x-rays show bone on bone arthritis. The patient was recommended to undergo a left hip intra-articular cortisone injection under imaging guidance for therapeutic purposes. The patient will follow up 3 weeks post injection.    As for her left knee pain, it is likely referred from her left hip. On imaging the patient's left knee implants appear well-fixed without signs of loosening or fracture. Physical exam of her left knee was benign.

## 2024-01-30 NOTE — ADDENDUM
[FreeTextEntry1] : This note was authored by Norman Ahn working as a medical scribe for Dr. Harvey Huitron. The note was reviewed, edited, and revised by Dr. Harvey Huitron whom is in agreement with the exam findings, imaging findings, and treatment plan. 01/30/2024

## 2024-01-30 NOTE — CONSULT LETTER
[Dear  ___] : Dear  [unfilled], [Consult Letter:] : I had the pleasure of evaluating your patient, [unfilled]. [Please see my note below.] : Please see my note below. [Consult Closing:] : Thank you very much for allowing me to participate in the care of this patient.  If you have any questions, please do not hesitate to contact me. [Sincerely,] : Sincerely, [FreeTextEntry3] : Harvey Huitron MD Chief of Joint Replacement Primary & Revision Hip and Knee Replacement  [facility] [place]

## 2024-02-27 ENCOUNTER — APPOINTMENT (OUTPATIENT)
Dept: ORTHOPEDIC SURGERY | Facility: CLINIC | Age: 84
End: 2024-02-27
Payer: MEDICARE

## 2024-02-27 VITALS — BODY MASS INDEX: 28.17 KG/M2 | HEIGHT: 63 IN | WEIGHT: 159 LBS

## 2024-02-27 PROCEDURE — 99213 OFFICE O/P EST LOW 20 MIN: CPT

## 2024-02-27 NOTE — DISCUSSION/SUMMARY
[de-identified] : VALENTIN ROCHE is a 83 year old female who presents with left knee and hip pain s/p left total knee replacement. Left hip x-rays show bone on bone arthritis. The patient unfortunately did not get significant relief from the injection. She wishes to remain conservative at this time. She will continue to take Celebrex. A script for in home PT was provided. She will follow up in 6 weeks if no improvement.

## 2024-03-07 ENCOUNTER — APPOINTMENT (OUTPATIENT)
Dept: ORTHOPEDIC SURGERY | Facility: CLINIC | Age: 84
End: 2024-03-07
Payer: MEDICARE

## 2024-03-07 VITALS — BODY MASS INDEX: 28.17 KG/M2 | HEIGHT: 63 IN | WEIGHT: 159 LBS

## 2024-03-07 DIAGNOSIS — Z96.641 PRESENCE OF RIGHT ARTIFICIAL HIP JOINT: ICD-10-CM

## 2024-03-07 PROCEDURE — 99203 OFFICE O/P NEW LOW 30 MIN: CPT

## 2024-03-07 PROCEDURE — 73502 X-RAY EXAM HIP UNI 2-3 VIEWS: CPT

## 2024-03-10 NOTE — ASSESSMENT
[FreeTextEntry1] : The patient is a 82 yo woman presenting with her daughter with bilateral hip pain, left worse than right. She had a right PARAMJIT in 1993 that is mildly to moderately painful. She has significant left hip pain to the groin and with any activity. She has taken to a wheelchair at this time secondary to the pain. She had a left hip IA injection on 1/30/24. She wishes to consider a left hip PARAMJIT at the end of April. The patient has pain at night and at rest. She is doing well otherwise.   B/L hip exam: The patient presents with no apparent distress. Exam is limited secondary to patient pain and condition. Neurovascularly intact. Sensation intact to bilateral lower extremities. Operative incision is well healed. No active drainage or discharge. + straight leg raise with pain. Tender to palpation to lateral hips Patient is ambulating without pain.  Bilateral hip xrays taken today in office, 1 view AP pelvis and two views hip, NWB: Right hip PARAMJIT hardware in acceptable alignment with possible wear of the poly due to slight movement of the femoral head insert. 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 No fractures or loose bodies. No obvious tumors, masses, or lesions.  The patient will consider a left PARAMJIT after 4/30/24. She had a IA injection on 1/30/24. She will return to meet Dr. Vides as well.

## 2024-03-10 NOTE — REASON FOR VISIT
Group Topic: BH Recovery Skills    Date: 10/21/2019  Start Time:  7:45 PM  End Time:  8:30 PM    Focus: Check out/triggers, cravings.  Number in attendance: 3    Attendance: Present  Response Communication: Appropriate to topic  Mood/Affect: Appropriate to group  Behavior/Socialization: Appropriate to group  Thought Process: Appropriate  Patient Response: Appropriate feedback    Pt stated that he will be going to a meeting and talking to his sponsor tomorrow. Pt stated that he knows he needs to stop beating himself up for his relapse, but it's hard to deal with.   [FreeTextEntry2] : NI - B/L Hips

## 2024-03-10 NOTE — HISTORY OF PRESENT ILLNESS
[Meds] : meds [Injection therapy] : injection therapy [Walking] : walking [Stairs] : stairs [] : yes [FreeTextEntry7] : groin  [FreeTextEntry5] : 82 y/o F presents for NI eval of both hips today. Pt reports of hip pain with no associated trauma. Persistent clicking. Hx of Rt. THR. She has tried IA injection with little relief. C/o mobility issues. 2 sessions of home PT. Celebrex as needed.  [FreeTextEntry9] : Voltaren, topical CBD oil

## 2024-03-24 ENCOUNTER — INPATIENT (INPATIENT)
Facility: HOSPITAL | Age: 84
LOS: 1 days | Discharge: HOME | DRG: 392 | End: 2024-03-26
Attending: INTERNAL MEDICINE | Admitting: STUDENT IN AN ORGANIZED HEALTH CARE EDUCATION/TRAINING PROGRAM
Payer: MEDICARE

## 2024-03-24 VITALS
WEIGHT: 154.98 LBS | SYSTOLIC BLOOD PRESSURE: 109 MMHG | RESPIRATION RATE: 18 BRPM | HEIGHT: 63 IN | OXYGEN SATURATION: 100 % | DIASTOLIC BLOOD PRESSURE: 57 MMHG | HEART RATE: 74 BPM | TEMPERATURE: 98 F

## 2024-03-24 DIAGNOSIS — K52.9 NONINFECTIVE GASTROENTERITIS AND COLITIS, UNSPECIFIED: ICD-10-CM

## 2024-03-24 LAB
ALBUMIN SERPL ELPH-MCNC: 3.3 G/DL — SIGNIFICANT CHANGE UP (ref 3.3–5)
ALP SERPL-CCNC: 89 U/L — SIGNIFICANT CHANGE UP (ref 40–120)
ALT FLD-CCNC: 14 U/L — SIGNIFICANT CHANGE UP (ref 12–78)
ANION GAP SERPL CALC-SCNC: 12 MMOL/L — SIGNIFICANT CHANGE UP (ref 5–17)
AST SERPL-CCNC: 16 U/L — SIGNIFICANT CHANGE UP (ref 15–37)
BASOPHILS # BLD AUTO: 0.07 K/UL — SIGNIFICANT CHANGE UP (ref 0–0.2)
BASOPHILS NFR BLD AUTO: 0.7 % — SIGNIFICANT CHANGE UP (ref 0–2)
BILIRUB SERPL-MCNC: 0.6 MG/DL — SIGNIFICANT CHANGE UP (ref 0.2–1.2)
BUN SERPL-MCNC: 36 MG/DL — HIGH (ref 7–23)
CALCIUM SERPL-MCNC: 10.2 MG/DL — HIGH (ref 8.5–10.1)
CHLORIDE SERPL-SCNC: 111 MMOL/L — HIGH (ref 96–108)
CO2 SERPL-SCNC: 24 MMOL/L — SIGNIFICANT CHANGE UP (ref 22–31)
CREAT SERPL-MCNC: 0.82 MG/DL — SIGNIFICANT CHANGE UP (ref 0.5–1.3)
EGFR: 70 ML/MIN/1.73M2 — SIGNIFICANT CHANGE UP
EOSINOPHIL # BLD AUTO: 0.21 K/UL — SIGNIFICANT CHANGE UP (ref 0–0.5)
EOSINOPHIL NFR BLD AUTO: 2.1 % — SIGNIFICANT CHANGE UP (ref 0–6)
GLUCOSE SERPL-MCNC: 107 MG/DL — HIGH (ref 70–99)
HCT VFR BLD CALC: 38.1 % — SIGNIFICANT CHANGE UP (ref 34.5–45)
HGB BLD-MCNC: 12.7 G/DL — SIGNIFICANT CHANGE UP (ref 11.5–15.5)
IMM GRANULOCYTES NFR BLD AUTO: 0.5 % — SIGNIFICANT CHANGE UP (ref 0–0.9)
LACTATE SERPL-SCNC: 1.5 MMOL/L — SIGNIFICANT CHANGE UP (ref 0.7–2)
LIDOCAIN IGE QN: 25 U/L — SIGNIFICANT CHANGE UP (ref 13–75)
LYMPHOCYTES # BLD AUTO: 2.14 K/UL — SIGNIFICANT CHANGE UP (ref 1–3.3)
LYMPHOCYTES # BLD AUTO: 21.7 % — SIGNIFICANT CHANGE UP (ref 13–44)
MCHC RBC-ENTMCNC: 30.6 PG — SIGNIFICANT CHANGE UP (ref 27–34)
MCHC RBC-ENTMCNC: 33.3 GM/DL — SIGNIFICANT CHANGE UP (ref 32–36)
MCV RBC AUTO: 91.8 FL — SIGNIFICANT CHANGE UP (ref 80–100)
MONOCYTES # BLD AUTO: 0.75 K/UL — SIGNIFICANT CHANGE UP (ref 0–0.9)
MONOCYTES NFR BLD AUTO: 7.6 % — SIGNIFICANT CHANGE UP (ref 2–14)
NEUTROPHILS # BLD AUTO: 6.66 K/UL — SIGNIFICANT CHANGE UP (ref 1.8–7.4)
NEUTROPHILS NFR BLD AUTO: 67.4 % — SIGNIFICANT CHANGE UP (ref 43–77)
PLATELET # BLD AUTO: 276 K/UL — SIGNIFICANT CHANGE UP (ref 150–400)
POTASSIUM SERPL-MCNC: 4.2 MMOL/L — SIGNIFICANT CHANGE UP (ref 3.5–5.3)
POTASSIUM SERPL-SCNC: 4.2 MMOL/L — SIGNIFICANT CHANGE UP (ref 3.5–5.3)
PROT SERPL-MCNC: 6.8 GM/DL — SIGNIFICANT CHANGE UP (ref 6–8.3)
RBC # BLD: 4.15 M/UL — SIGNIFICANT CHANGE UP (ref 3.8–5.2)
RBC # FLD: 14.9 % — HIGH (ref 10.3–14.5)
SODIUM SERPL-SCNC: 147 MMOL/L — HIGH (ref 135–145)
WBC # BLD: 9.88 K/UL — SIGNIFICANT CHANGE UP (ref 3.8–10.5)
WBC # FLD AUTO: 9.88 K/UL — SIGNIFICANT CHANGE UP (ref 3.8–10.5)

## 2024-03-24 PROCEDURE — 82962 GLUCOSE BLOOD TEST: CPT

## 2024-03-24 PROCEDURE — 83036 HEMOGLOBIN GLYCOSYLATED A1C: CPT

## 2024-03-24 PROCEDURE — 83690 ASSAY OF LIPASE: CPT

## 2024-03-24 PROCEDURE — 80061 LIPID PANEL: CPT

## 2024-03-24 PROCEDURE — 85025 COMPLETE CBC W/AUTO DIFF WBC: CPT

## 2024-03-24 PROCEDURE — 99223 1ST HOSP IP/OBS HIGH 75: CPT

## 2024-03-24 PROCEDURE — 74177 CT ABD & PELVIS W/CONTRAST: CPT | Mod: 26,MC

## 2024-03-24 PROCEDURE — 36415 COLL VENOUS BLD VENIPUNCTURE: CPT

## 2024-03-24 PROCEDURE — 99285 EMERGENCY DEPT VISIT HI MDM: CPT | Mod: FS

## 2024-03-24 RX ORDER — PIPERACILLIN AND TAZOBACTAM 4; .5 G/20ML; G/20ML
3.38 INJECTION, POWDER, LYOPHILIZED, FOR SOLUTION INTRAVENOUS EVERY 8 HOURS
Refills: 0 | Status: DISCONTINUED | OUTPATIENT
Start: 2024-03-25 | End: 2024-03-25

## 2024-03-24 RX ORDER — LISINOPRIL 2.5 MG/1
40 TABLET ORAL DAILY
Refills: 0 | Status: DISCONTINUED | OUTPATIENT
Start: 2024-03-24 | End: 2024-03-26

## 2024-03-24 RX ORDER — PIPERACILLIN AND TAZOBACTAM 4; .5 G/20ML; G/20ML
3.38 INJECTION, POWDER, LYOPHILIZED, FOR SOLUTION INTRAVENOUS ONCE
Refills: 0 | Status: COMPLETED | OUTPATIENT
Start: 2024-03-25 | End: 2024-03-25

## 2024-03-24 RX ORDER — DIPHENOXYLATE HCL/ATROPINE 2.5-.025MG
1 TABLET ORAL
Refills: 0 | Status: DISCONTINUED | OUTPATIENT
Start: 2024-03-24 | End: 2024-03-26

## 2024-03-24 RX ORDER — PIPERACILLIN AND TAZOBACTAM 4; .5 G/20ML; G/20ML
3.38 INJECTION, POWDER, LYOPHILIZED, FOR SOLUTION INTRAVENOUS ONCE
Refills: 0 | Status: DISCONTINUED | OUTPATIENT
Start: 2024-03-25 | End: 2024-03-25

## 2024-03-24 RX ORDER — ATORVASTATIN CALCIUM 80 MG/1
20 TABLET, FILM COATED ORAL AT BEDTIME
Refills: 0 | Status: DISCONTINUED | OUTPATIENT
Start: 2024-03-24 | End: 2024-03-26

## 2024-03-24 RX ORDER — CEFTRIAXONE 500 MG/1
1000 INJECTION, POWDER, FOR SOLUTION INTRAMUSCULAR; INTRAVENOUS ONCE
Refills: 0 | Status: DISCONTINUED | OUTPATIENT
Start: 2024-03-24 | End: 2024-03-24

## 2024-03-24 RX ORDER — INSULIN LISPRO 100/ML
VIAL (ML) SUBCUTANEOUS AT BEDTIME
Refills: 0 | Status: DISCONTINUED | OUTPATIENT
Start: 2024-03-25 | End: 2024-03-26

## 2024-03-24 RX ORDER — DEXTROSE 50 % IN WATER 50 %
25 SYRINGE (ML) INTRAVENOUS ONCE
Refills: 0 | Status: DISCONTINUED | OUTPATIENT
Start: 2024-03-24 | End: 2024-03-26

## 2024-03-24 RX ORDER — LEVOTHYROXINE SODIUM 125 MCG
100 TABLET ORAL DAILY
Refills: 0 | Status: DISCONTINUED | OUTPATIENT
Start: 2024-03-24 | End: 2024-03-26

## 2024-03-24 RX ORDER — CEFTRIAXONE 500 MG/1
1000 INJECTION, POWDER, FOR SOLUTION INTRAMUSCULAR; INTRAVENOUS ONCE
Refills: 0 | Status: COMPLETED | OUTPATIENT
Start: 2024-03-24 | End: 2024-03-24

## 2024-03-24 RX ORDER — GLUCAGON INJECTION, SOLUTION 0.5 MG/.1ML
1 INJECTION, SOLUTION SUBCUTANEOUS ONCE
Refills: 0 | Status: DISCONTINUED | OUTPATIENT
Start: 2024-03-24 | End: 2024-03-26

## 2024-03-24 RX ORDER — HYDRALAZINE HCL 50 MG
30 TABLET ORAL EVERY 12 HOURS
Refills: 0 | Status: DISCONTINUED | OUTPATIENT
Start: 2024-03-24 | End: 2024-03-26

## 2024-03-24 RX ORDER — DEXTROSE 50 % IN WATER 50 %
12.5 SYRINGE (ML) INTRAVENOUS ONCE
Refills: 0 | Status: DISCONTINUED | OUTPATIENT
Start: 2024-03-24 | End: 2024-03-26

## 2024-03-24 RX ORDER — ACETAMINOPHEN 500 MG
650 TABLET ORAL EVERY 6 HOURS
Refills: 0 | Status: DISCONTINUED | OUTPATIENT
Start: 2024-03-24 | End: 2024-03-26

## 2024-03-24 RX ORDER — ONDANSETRON 8 MG/1
4 TABLET, FILM COATED ORAL EVERY 6 HOURS
Refills: 0 | Status: DISCONTINUED | OUTPATIENT
Start: 2024-03-24 | End: 2024-03-26

## 2024-03-24 RX ORDER — SODIUM CHLORIDE 9 MG/ML
1000 INJECTION, SOLUTION INTRAVENOUS
Refills: 0 | Status: DISCONTINUED | OUTPATIENT
Start: 2024-03-24 | End: 2024-03-26

## 2024-03-24 RX ORDER — LANOLIN ALCOHOL/MO/W.PET/CERES
3 CREAM (GRAM) TOPICAL AT BEDTIME
Refills: 0 | Status: DISCONTINUED | OUTPATIENT
Start: 2024-03-24 | End: 2024-03-26

## 2024-03-24 RX ORDER — INSULIN LISPRO 100/ML
VIAL (ML) SUBCUTANEOUS
Refills: 0 | Status: DISCONTINUED | OUTPATIENT
Start: 2024-03-24 | End: 2024-03-26

## 2024-03-24 RX ORDER — SODIUM CHLORIDE 9 MG/ML
500 INJECTION INTRAMUSCULAR; INTRAVENOUS; SUBCUTANEOUS ONCE
Refills: 0 | Status: COMPLETED | OUTPATIENT
Start: 2024-03-24 | End: 2024-03-24

## 2024-03-24 RX ORDER — DEXTROSE 50 % IN WATER 50 %
15 SYRINGE (ML) INTRAVENOUS ONCE
Refills: 0 | Status: DISCONTINUED | OUTPATIENT
Start: 2024-03-24 | End: 2024-03-26

## 2024-03-24 RX ORDER — HYDRALAZINE HCL 50 MG
10 TABLET ORAL AT BEDTIME
Refills: 0 | Status: DISCONTINUED | OUTPATIENT
Start: 2024-03-24 | End: 2024-03-24

## 2024-03-24 RX ORDER — PIPERACILLIN AND TAZOBACTAM 4; .5 G/20ML; G/20ML
3.38 INJECTION, POWDER, LYOPHILIZED, FOR SOLUTION INTRAVENOUS ONCE
Refills: 0 | Status: COMPLETED | OUTPATIENT
Start: 2024-03-24 | End: 2024-03-24

## 2024-03-24 RX ORDER — MONTELUKAST 4 MG/1
10 TABLET, CHEWABLE ORAL AT BEDTIME
Refills: 0 | Status: DISCONTINUED | OUTPATIENT
Start: 2024-03-24 | End: 2024-03-26

## 2024-03-24 RX ORDER — CARVEDILOL PHOSPHATE 80 MG/1
25 CAPSULE, EXTENDED RELEASE ORAL EVERY 12 HOURS
Refills: 0 | Status: DISCONTINUED | OUTPATIENT
Start: 2024-03-24 | End: 2024-03-26

## 2024-03-24 RX ADMIN — ATORVASTATIN CALCIUM 20 MILLIGRAM(S): 80 TABLET, FILM COATED ORAL at 22:24

## 2024-03-24 RX ADMIN — SODIUM CHLORIDE 1000 MILLILITER(S): 9 INJECTION INTRAMUSCULAR; INTRAVENOUS; SUBCUTANEOUS at 20:34

## 2024-03-24 RX ADMIN — SODIUM CHLORIDE 500 MILLILITER(S): 9 INJECTION INTRAMUSCULAR; INTRAVENOUS; SUBCUTANEOUS at 16:44

## 2024-03-24 RX ADMIN — CARVEDILOL PHOSPHATE 25 MILLIGRAM(S): 80 CAPSULE, EXTENDED RELEASE ORAL at 22:24

## 2024-03-24 RX ADMIN — CEFTRIAXONE 1000 MILLIGRAM(S): 500 INJECTION, POWDER, FOR SOLUTION INTRAMUSCULAR; INTRAVENOUS at 20:33

## 2024-03-24 NOTE — ED PROVIDER NOTE - ATTENDING APP SHARED VISIT CONTRIBUTION OF CARE
Dr. Saini: I performed a face to face bedside interview with patient regarding history of present illness, review of symptoms and past medical history. I completed an independent physical exam.  I have discussed patient's plan of care with PA.   I agree with note as stated above, having amended the EMR as needed to reflect my findings.   This includes HISTORY OF PRESENT ILLNESS, HIV, PAST MEDICAL/SURGICAL/FAMILY/SOCIAL HISTORY, ALLERGIES AND HOME MEDICATIONS, REVIEW OF SYSTEMS, PHYSICAL EXAM, and any PROGRESS NOTES during the time I functioned as the attending physician for this patient.  CARLINE Saini DO

## 2024-03-24 NOTE — ED PROVIDER NOTE - NS ED MD DISPO DIVISION
PGY2 Magnesium Note     Subjective:   Pt evaluated at bedside for magnesium check. She endorses a slight headache since the mag was restarted, rated 3/10. It is not bothering her that much and she does not want any medical management at this time. She denies vision changes, dizziness, SOB, chest pain, RUQ/epigastric pain.    Objective:   Vital Signs Last 24 Hrs  T(C): 36.6 (14 Nov 2023 15:12), Max: 36.8 (13 Nov 2023 21:23)  T(F): 97.8 (14 Nov 2023 15:12), Max: 98.2 (13 Nov 2023 21:23)  HR: 91 (14 Nov 2023 16:57) (71 - 110)  BP: 134/83 (14 Nov 2023 16:49) (93/56 - 187/98)  RR: 16 (14 Nov 2023 09:17) (16 - 18)  SpO2: 97% (14 Nov 2023 16:57) (94% - 99%)    I&O's Summary    13 Nov 2023 07:01  -  14 Nov 2023 07:00  --------------------------------------------------------  IN: 1375 mL / OUT: 5375 mL / NET: -4000 mL    14 Nov 2023 07:01  -  14 Nov 2023 17:00  --------------------------------------------------------  IN: 950 mL / OUT: 1903 mL / NET: -953 mL    Physical Exam  Gen: NAD, AAOx3  CV: S1S2, RRR, no M/R/G  Pulm: CTAB, no rhonchi or rales or wheezing  Ext: no calf tenderness or edema SCDs in place  Neuro: 1+ DTR bilaterally  Abd: soft, nontender, no rebound or guarding, no epigastric tenderness  LOCHIA: minimal     MEDICATIONS  (STANDING):  acetaminophen     Tablet .. 975 milliGRAM(s) Oral <User Schedule>  ibuprofen  Tablet. 600 milliGRAM(s) Oral every 6 hours  magnesium sulfate Infusion 2 Gm/Hr (50 mL/Hr) IV Continuous <Continuous>  prenatal multivitamin 1 Tablet(s) Oral daily  sodium chloride 0.9% lock flush 3 milliLiter(s) IV Push every 8 hours    MEDICATIONS  (PRN):  benzocaine 20%/menthol 0.5% Spray 1 Spray(s) Topical every 6 hours PRN for Perineal discomfort  dexAMETHasone  Injectable 4 milliGRAM(s) IV Push every 6 hours PRN Nausea  dibucaine 1% Ointment 1 Application(s) Topical every 6 hours PRN Perineal discomfort  diphenhydrAMINE 25 milliGRAM(s) Oral every 6 hours PRN Pruritus  lanolin Ointment 1 Application(s) Topical every 6 hours PRN nipple soreness  magnesium hydroxide Suspension 30 milliLiter(s) Oral two times a day PRN Constipation  ondansetron Injectable 4 milliGRAM(s) IV Push every 6 hours PRN Nausea  oxyCODONE    IR 5 milliGRAM(s) Oral every 3 hours PRN Moderate to Severe Pain (4-10)  oxyCODONE    IR 5 milliGRAM(s) Oral once PRN Moderate to Severe Pain (4-10)  simethicone 80 milliGRAM(s) Chew every 4 hours PRN Gas  witch hazel Pads 1 Application(s) Topical every 4 hours PRN Perineal discomfort    Labs:                         12.0   13.00 )-----------( 148      ( 14 Nov 2023 14:52 )             35.8   11-14    130<L>  |  98  |  7<L>  ----------------------------<  135<H>  4.2   |  22  |  0.6<L>    Ca    6.9<L>      14 Nov 2023 14:52  Mg     5.1     11-14    TPro  5.7<L>  /  Alb  3.3<L>  /  TBili  0.5  /  DBili  x   /  AST  17  /  ALT  15  /  AlkPhos  80  11-14   [facility]

## 2024-03-24 NOTE — ED PROVIDER NOTE - CLINICAL SUMMARY MEDICAL DECISION MAKING FREE TEXT BOX
82 y/o female with a PMHx of DM, HTN, HLD, rotator cuff tear presents to the ED c/o generalized weakness, complaints of diarrhea x 4-5 days. no abdominal pain. no blood in stool. no cough, fever, chills, n/v. has hx IBS and partial colon resection 2010 after abscess  will check labs, IVF, CT to eval for colitis/diverticulitis

## 2024-03-24 NOTE — ED ADULT TRIAGE NOTE - CHIEF COMPLAINT QUOTE
pt presents to Ed with complaints of diarrhea x 4-5 days. pt has extensive GI history. pt endorses "diarrhea is like water with curdles in it"

## 2024-03-24 NOTE — ED PROVIDER NOTE - PROGRESS NOTE DETAILS
I, KYUNG Segal personally discussed the case with consultant, Dr. Pagan attending hospitalist, about pt who has colitis and diarrhea and will need hospital admission." Sidle: pt has had multiple diffuse episodes of water mucous filled, non bloody diarrhea while in ED. ED shows colitis

## 2024-03-24 NOTE — ED PROVIDER NOTE - PHYSICAL EXAMINATION
General:     NAD, well-nourished, well-appearing  Eyes: no icterus  Head:     NC/AT, dry oral mucosa  Neck:     trachea midline  Lungs:     CTA b/l  CVS:     RRR  Abd:     LLQ TTP wo rebound, guarding or distension   Ext:   left ankle swelling from arthritis  Neuro: AAOx3

## 2024-03-24 NOTE — H&P ADULT - ASSESSMENT
83 year old female w CAD, DM II, HTN, HLD, HTN IBS, partial colon resection after  presents to  ED c/o generalized weakness,+diarrhea x 4-5 days        #Acute rectosigmoid proctocolitis  #hx IBS  #Hx partial colon resection for diverticular abscess  1. admit to med  2. GI PCR and C diff pending  3. contact isolation  4. ceftriaxone in ED  5. change to zosyn  6. clear diet  7. GI consult      #CAD  #HLD  1. statin      #DM II  1. diet is clear  2. hold oral agent  3. BGM  3. ISS  4.  Hb A1c      #HTN  1. coreg  2. hydralazine  3. lisinopril      #Hypothyroid  1. levothyroxine      #MISC  1. montelukast      #VTE  bleeding risk        #80 minutes       83 year old female w CAD, DM II, HTN, HLD, HTN IBS, partial colon resection after  presents to  ED c/o generalized weakness,+diarrhea x 4-5 days        #Acute rectosigmoid proctocolitis  #hx IBS  #Hx partial colon resection for diverticular abscess  1. admit to med  2. GI PCR and C diff pending  3. contact isolation  4. ceftriaxone in ED  5. change to zosyn  6. clear diet  7. GI consult      #CAD  #HLD  1. statin      #DM II  1. diet is clear  2. hold oral agent  3. BGM  3. ISS  4.  Hb A1c      #HTN  1. coreg  2. hydralazine  3. lisinopril      #Hypothyroid  1. levothyroxine        Left hip pain  bone on bone   1. ambulate w assistance  2. pt using a wheelchair too      #MISC  1. montelukast      #VTE  bleeding risk        #80 minutes

## 2024-03-24 NOTE — PHARMACOTHERAPY INTERVENTION NOTE - COMMENTS
Medication history complete. Medications and allergies reviewed with patient's daughter, Anushka and confirmed with .

## 2024-03-24 NOTE — H&P ADULT - NSHPLABSRESULTS_GEN_ALL_CORE
FINDINGS:  LOWER CHEST: Mild cardiomegaly. Coronary artery and aortic   calcifications. Small hiatal hernia. Lung bases are clear.    LIVER: 7 mm hypodensity at the tip of segment 6, too small to   characterize, not visualized on prior CT 2013.  BILE DUCTS: Normal caliber.  GALLBLADDER: Cholecystectomy.  SPLEEN: Within normal limits.  PANCREAS: Advanced fatty lobulation and pancreatic atrophy. No obvious   pancreatic duct dilatation.  ADRENALS: Within normal limits.  KIDNEYS/URETERS: Bilateral renal cysts. Numerous nonobstructing renal   calculi in the right kidney lower pole up to 5 mm.    BLADDER: Distended but otherwise limited for evaluation due to overlying   artifacts.  REPRODUCTIVE ORGANS: Hysterectomy, no adnexal mass.    BOWEL: Small hiatal hernia. Stomach, duodenum and small bowel containing   oral contrast are otherwise normal in appearance. Contrast has reached   the rectum. There is no bowel obstruction. Appendix is not visualized. No   obvious pericecal inflammation. There is diffuse wall thickening   involving the sigmoid colon and rectum with several diverticula, which   however are not individually inflamed. There is mild submucosal edema and   a small amount of periintestinal edema involving the sigmoid and a small   amount of fluid in the pelvis adjacent to the sigmoid colon. No colonic   obstruction.  PERITONEUM: Small amount of free pelvic fluid. No pneumoperitoneum.  VESSELS: Atherosclerotic changes. Hepatic veins, portal vein, SMV, renal   veins and IVC are patent.  RETROPERITONEUM/LYMPH NODES: No lymphadenopathy.  ABDOMINAL WALL: Within normal limits.  BONES: Right-sided total hip replacement with lytic changes involving the   issue tuberosity, appearance is however similar to prior imaging   12/2/2013. Grade 1 anterolisthesis of L4 over L5. Partial vertebral   fusion L1 to. Multilevel discogenic degenerative disease.    IMPRESSION:  Acute rectosigmoid proctocolitis.    No bowel obstruction.    Nonobstructing right nephrolithiasis.

## 2024-03-24 NOTE — ED ADULT NURSE NOTE - OBJECTIVE STATEMENT
pt. is a 82 y/o female complaining of diarrhea x1 week, brought to ED with use of wheelchair, a&ox3. pt. states, "I have been having episodes of diarrhea with curdles in it; this has been occurring every 2 hours during the day". PMHx of DM, HTN, HLD, rotator cuff. hx IBS and partial colon resection 2010 after abscess. + tenderness to palpation. - bloody stool,

## 2024-03-24 NOTE — ED ADULT NURSE REASSESSMENT NOTE - NS ED NURSE REASSESS COMMENT FT1
pt. drank contrast, had 2 subsequent loose bowel movements of watery diarrhea within 20 mins of each other. diarrhea noted as foul- smelling, C- diff sample collected and sent to lab. pt. changed and taken to CT scan.

## 2024-03-24 NOTE — H&P ADULT - HISTORY OF PRESENT ILLNESS
83 year old female w DM, HTN, HLD, rotator cuff tear presents to  ED c/o generalized weakness, complaints of diarrhea x 4-5 days. no abdominal pain. no blood in stool. no cough, fever, chills, n/v. has hx IBS and partial colon resection 2010 after abscess      extensive GI history. pt endorses "diarrhea is like water with curdles in it"      In ED /57   HR 74   RR 18   T 98.1   100% sat RA        PAST MEDICAL HX          PAST SURGICAL HX       83 year old female w DM, HTN, HLD, IBS, partial colon resection after  presents to  ED c/o generalized weakness, complaints of diarrhea x 4-5 days.     no fever   no travel or sick contacts    +multiple episodes of watery diarrhea that is orange brown for 4-5 days; denied melena or blood   "diarrhea is like water with curdles in it"  +LLQ abdominal pain intermittent        In ED /57   HR 74   RR 18   T 98.1   100% sat RA  CT no obstruction + proctocolitis  ceftriaxone        PAST MEDICAL HX  PAST MEDICAL HX  Anxiety   Arthritis   CAD (coronary artery disease)  Chronic GERD  Degenerative disc disease   Diabetes mellitus   Eczema   Functional diarrhea   Hyperlipidemia   Hypertension   Hypothyroidism, adult   IBS (irritable bowel syndrome)  Lymphedema of both lower extremities  Neoplasm of uncertain behavior of skin   OAB (overactive bladder)   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 Nichole)  Cataract Surgery  Cholecystectomy  Hysterectomy  Neuroplasty Decompression Median Nerve At Carpal Tunnel  Tonsillectomy  Total Hip Replacement Right  Total Knee Replacement Right  History of Varicose Vein Ligation          PAST SURGICAL HX       83 year old female w CAD, DM II, HTN, HLD, HTN IBS, partial colon resection after  presents to  ED c/o generalized weakness, complaints of diarrhea x 4-5 days.     no fever   no travel or sick contacts    +multiple episodes of watery diarrhea that is orange brown for 4-5 days; denied melena or blood   "diarrhea is like water with curdles in it"  +LLQ abdominal pain intermittent  +Anorexia w reduced oral intake        In ED /57   HR 74   RR 18   T 98.1   100% sat RA  CT no obstruction + proctocolitis  ceftriaxone          PAST MEDICAL HX  Anxiety   Arthritis   CAD (coronary artery disease)  Chronic GERD  Degenerative disc disease   Diabetes mellitus   Eczema   Frequent falls  Functional diarrhea   HLD hyperlipidemia   HTN hypertension   Hypothyroidism, adult  Hx sepsis   IBS (irritable bowel syndrome)  Lymphedema of both lower extremities  Neoplasm of uncertain behavior of skin   OAB (overactive bladder)   Neoplasm of uncertain behavior of skin   OAB (overactive bladder)  Rotator cuff syndrome : both shoulders  Sleep apnea   Thyroid node      PAST SURGICAL HX  Abdominal Surgery partial colon resection 2010 for diverticular abscess (Dr. Nichole)  Cataract Surgery  Cholecystectomy  Hysterectomy  Neuroplasty Decompression Median Nerve At Carpal Tunnel  Tonsillectomy  Total Hip Replacement Right  Total Knee Replacement Right  History of Varicose Vein Ligation      FAMILY HX  Cerebral thrombosis : Mother  congestive heart failure  : Father  heart attack : Father  Parkinson's disease : Mother        SOCIAL HX  never smoker  was using a cane but "I messed up my shoulders and am not going for a replacement "  L hip is bone on bone so using wheelchair       83 year old female w CAD, DM II, HTN, HLD, HTN IBS, partial colon resection after  presents to  ED c/o generalized weakness,+diarrhea x 4-5 days.     no fever   no travel or sick contacts    +multiple episodes of watery diarrhea that is orange brown for 4-5 days; denied melena or blood   "diarrhea is like water with curdles in it"  +LLQ abdominal pain intermittent, moderate  +mild anorexia w reduced oral intake        In ED /57   HR 74   RR 18   T 98.1   100% sat RA  CT no obstruction + acute rectosigmoid proctocolitis  ceftriaxone   cc x 2          PAST MEDICAL HX  Anxiety   Arthritis   CAD (coronary artery disease)  Chronic GERD  Degenerative disc disease   Diabetes mellitus   Eczema   Frequent falls  Functional diarrhea   HLD hyperlipidemia   HTN hypertension   Hypothyroidism, adult  Hx sepsis   IBS (irritable bowel syndrome)  Lymphedema of both lower extremities  Neoplasm of uncertain behavior of skin   OAB (overactive bladder)   Neoplasm of uncertain behavior of skin   OAB (overactive bladder)  Rotator cuff syndrome : both shoulders  Sleep apnea   Thyroid node      PAST SURGICAL HX  Abdominal Surgery partial colon resection 2010 for diverticular abscess (Dr. Nichole)  Cataract Surgery  Cholecystectomy  Hysterectomy  Neuroplasty Decompression Median Nerve At Carpal Tunnel  Tonsillectomy  Total Hip Replacement Right  Total Knee Replacement Right  History of Varicose Vein Ligation  Endoscopy and colonoscopy pt states 4 years ago + polyps      FAMILY HX  Cerebral thrombosis : Mother  congestive heart failure  : Father  heart attack : Father  Parkinson's disease : Mother        SOCIAL HX  never smoker  was using a cane but "I messed up my shoulders and am not going for a replacement "  L hip is bone on bone so using wheelchair       83 year old female w CAD, DM II, HTN, HLD, HTN IBS, partial colon resection after  presents to  ED in wheelchair c/o generalized weakness,+diarrhea x 4-5 days.     no fever   no travel or sick contacts    +multiple episodes of watery diarrhea that is orange brown for 4-5 days; denied melena or blood   "diarrhea is like water with curdles in it"  +LLQ abdominal pain intermittent, moderate  +mild anorexia w reduced oral intake        In ED /57   HR 74   RR 18   T 98.1   100% sat RA  CT no obstruction + acute rectosigmoid proctocolitis  ceftriaxone   cc x 2          PAST MEDICAL HX  Anxiety   Arthritis   CAD (coronary artery disease)  Chronic GERD  Degenerative disc disease   Diabetes mellitus   Eczema   Frequent falls  Functional diarrhea   HLD hyperlipidemia   HTN hypertension   Hypothyroidism, adult  Hx sepsis   IBS (irritable bowel syndrome)  Lymphedema of both lower extremities  Neoplasm of uncertain behavior of skin   OAB (overactive bladder)   Neoplasm of uncertain behavior of skin   OAB (overactive bladder)  Rotator cuff syndrome : both shoulders  Sleep apnea   Thyroid node      PAST SURGICAL HX  Abdominal Surgery partial colon resection 2010 for diverticular abscess (Dr. Nichole)  Cataract Surgery  Cholecystectomy  Hysterectomy  Neuroplasty Decompression Median Nerve At Carpal Tunnel  Tonsillectomy  Total Hip Replacement Right  Total Knee Replacement Right  History of Varicose Vein Ligation  Endoscopy and colonoscopy pt states 4 years ago + polyps      FAMILY HX  Cerebral thrombosis : Mother  congestive heart failure  : Father  heart attack : Father  Parkinson's disease : Mother        SOCIAL HX  never smoker  was using a cane but "I messed up my shoulders and am not going for a replacement "  L hip is bone on bone so using wheelchair

## 2024-03-24 NOTE — INPATIENT CERTIFICATION FOR MEDICARE PATIENTS - NS ICMP TWO DAYS INPATIENT
Yes
PAST MEDICAL HISTORY:  Atherosclerosis     CKD (chronic kidney disease)     DM (diabetes mellitus)     HLD (hyperlipidemia)     HTN (hypertension)     Hyperkalemia

## 2024-03-24 NOTE — ED ADULT NURSE REASSESSMENT NOTE - NS ED NURSE REASSESS COMMENT FT1
received pt. from previous RN- Angeles NUNEZ, pt. is on bed, alert and oriented, awaiting for bed to be assign, pt. wheelchair and cane inside the room, safety maintained applied.

## 2024-03-24 NOTE — H&P ADULT - NSHPPHYSICALEXAM_GEN_ALL_CORE
Vital Signs Last 24 Hrs  T(C): 36.6 (24 Mar 2024 19:11), Max: 36.7 (24 Mar 2024 14:48)  T(F): 97.8 (24 Mar 2024 19:11), Max: 98.1 (24 Mar 2024 14:48)  HR: 66 (24 Mar 2024 19:11) (66 - 74)  BP: 126/86 (24 Mar 2024 19:11) (109/57 - 126/86)  BP(mean): --  RR: 18 (24 Mar 2024 19:11) (18 - 18)  SpO2: 95% (24 Mar 2024 19:11) (95% - 100%)    Parameters below as of 24 Mar 2024 19:11  Patient On (Oxygen Delivery Method): room air

## 2024-03-24 NOTE — ED PROVIDER NOTE - OBJECTIVE STATEMENT
84 y/o female with a PMHx of DM, HTN, HLD, rotator cuff tear presents to the ED c/o generalized weakness, complaints of diarrhea x 4-5 days. no abdominal pain. no blood in stool. no cough, fever, chills, n/v. has hx IBS and partial colon resection 2010 after abscess

## 2024-03-25 LAB
A1C WITH ESTIMATED AVERAGE GLUCOSE RESULT: 6.4 % — HIGH (ref 4–5.6)
BASOPHILS # BLD AUTO: 0.06 K/UL — SIGNIFICANT CHANGE UP (ref 0–0.2)
BASOPHILS NFR BLD AUTO: 0.7 % — SIGNIFICANT CHANGE UP (ref 0–2)
C DIFF BY PCR RESULT: SIGNIFICANT CHANGE UP
CHOLEST SERPL-MCNC: 86 MG/DL — SIGNIFICANT CHANGE UP
EOSINOPHIL # BLD AUTO: 0.18 K/UL — SIGNIFICANT CHANGE UP (ref 0–0.5)
EOSINOPHIL NFR BLD AUTO: 2.2 % — SIGNIFICANT CHANGE UP (ref 0–6)
EPEC DNA STL QL NAA+PROBE: DETECTED
ESTIMATED AVERAGE GLUCOSE: 137 MG/DL — HIGH (ref 68–114)
GI PCR PANEL: DETECTED
GLUCOSE BLDC GLUCOMTR-MCNC: 105 MG/DL — HIGH (ref 70–99)
GLUCOSE BLDC GLUCOMTR-MCNC: 110 MG/DL — HIGH (ref 70–99)
GLUCOSE BLDC GLUCOMTR-MCNC: 150 MG/DL — HIGH (ref 70–99)
GLUCOSE BLDC GLUCOMTR-MCNC: 155 MG/DL — HIGH (ref 70–99)
GLUCOSE BLDC GLUCOMTR-MCNC: 94 MG/DL — SIGNIFICANT CHANGE UP (ref 70–99)
GLUCOSE BLDC GLUCOMTR-MCNC: 96 MG/DL — SIGNIFICANT CHANGE UP (ref 70–99)
HCT VFR BLD CALC: 35.9 % — SIGNIFICANT CHANGE UP (ref 34.5–45)
HDLC SERPL-MCNC: 29 MG/DL — LOW
HGB BLD-MCNC: 11.9 G/DL — SIGNIFICANT CHANGE UP (ref 11.5–15.5)
IMM GRANULOCYTES NFR BLD AUTO: 0.9 % — SIGNIFICANT CHANGE UP (ref 0–0.9)
LIDOCAIN IGE QN: 22 U/L — SIGNIFICANT CHANGE UP (ref 13–75)
LIPID PNL WITH DIRECT LDL SERPL: 35 MG/DL — SIGNIFICANT CHANGE UP
LYMPHOCYTES # BLD AUTO: 1.79 K/UL — SIGNIFICANT CHANGE UP (ref 1–3.3)
LYMPHOCYTES # BLD AUTO: 21.7 % — SIGNIFICANT CHANGE UP (ref 13–44)
MCHC RBC-ENTMCNC: 30.6 PG — SIGNIFICANT CHANGE UP (ref 27–34)
MCHC RBC-ENTMCNC: 33.1 GM/DL — SIGNIFICANT CHANGE UP (ref 32–36)
MCV RBC AUTO: 92.3 FL — SIGNIFICANT CHANGE UP (ref 80–100)
MONOCYTES # BLD AUTO: 0.68 K/UL — SIGNIFICANT CHANGE UP (ref 0–0.9)
MONOCYTES NFR BLD AUTO: 8.3 % — SIGNIFICANT CHANGE UP (ref 2–14)
NEUTROPHILS # BLD AUTO: 5.45 K/UL — SIGNIFICANT CHANGE UP (ref 1.8–7.4)
NEUTROPHILS NFR BLD AUTO: 66.2 % — SIGNIFICANT CHANGE UP (ref 43–77)
NON HDL CHOLESTEROL: 57 MG/DL — SIGNIFICANT CHANGE UP
NOROVIRUS GI+II RNA STL QL NAA+NON-PROBE: ABNORMAL
PLATELET # BLD AUTO: 248 K/UL — SIGNIFICANT CHANGE UP (ref 150–400)
RBC # BLD: 3.89 M/UL — SIGNIFICANT CHANGE UP (ref 3.8–5.2)
RBC # FLD: 14.9 % — HIGH (ref 10.3–14.5)
TRIGL SERPL-MCNC: 126 MG/DL — SIGNIFICANT CHANGE UP
WBC # BLD: 8.23 K/UL — SIGNIFICANT CHANGE UP (ref 3.8–10.5)
WBC # FLD AUTO: 8.23 K/UL — SIGNIFICANT CHANGE UP (ref 3.8–10.5)

## 2024-03-25 PROCEDURE — 99232 SBSQ HOSP IP/OBS MODERATE 35: CPT

## 2024-03-25 RX ORDER — HEPARIN SODIUM 5000 [USP'U]/ML
5000 INJECTION INTRAVENOUS; SUBCUTANEOUS EVERY 12 HOURS
Refills: 0 | Status: DISCONTINUED | OUTPATIENT
Start: 2024-03-25 | End: 2024-03-26

## 2024-03-25 RX ORDER — CHLORHEXIDINE GLUCONATE 213 G/1000ML
1 SOLUTION TOPICAL
Refills: 0 | Status: DISCONTINUED | OUTPATIENT
Start: 2024-03-25 | End: 2024-03-26

## 2024-03-25 RX ORDER — SODIUM CHLORIDE 9 MG/ML
1000 INJECTION INTRAMUSCULAR; INTRAVENOUS; SUBCUTANEOUS
Refills: 0 | Status: DISCONTINUED | OUTPATIENT
Start: 2024-03-25 | End: 2024-03-26

## 2024-03-25 RX ADMIN — MONTELUKAST 10 MILLIGRAM(S): 4 TABLET, CHEWABLE ORAL at 21:22

## 2024-03-25 RX ADMIN — Medication 30 MILLIGRAM(S): at 00:52

## 2024-03-25 RX ADMIN — CARVEDILOL PHOSPHATE 25 MILLIGRAM(S): 80 CAPSULE, EXTENDED RELEASE ORAL at 21:23

## 2024-03-25 RX ADMIN — Medication 100 MICROGRAM(S): at 05:30

## 2024-03-25 RX ADMIN — Medication 30 MILLIGRAM(S): at 21:23

## 2024-03-25 RX ADMIN — HEPARIN SODIUM 5000 UNIT(S): 5000 INJECTION INTRAVENOUS; SUBCUTANEOUS at 10:39

## 2024-03-25 RX ADMIN — SODIUM CHLORIDE 80 MILLILITER(S): 9 INJECTION INTRAMUSCULAR; INTRAVENOUS; SUBCUTANEOUS at 16:25

## 2024-03-25 RX ADMIN — CARVEDILOL PHOSPHATE 25 MILLIGRAM(S): 80 CAPSULE, EXTENDED RELEASE ORAL at 10:39

## 2024-03-25 RX ADMIN — PIPERACILLIN AND TAZOBACTAM 25 GRAM(S): 4; .5 INJECTION, POWDER, LYOPHILIZED, FOR SOLUTION INTRAVENOUS at 05:31

## 2024-03-25 RX ADMIN — ATORVASTATIN CALCIUM 20 MILLIGRAM(S): 80 TABLET, FILM COATED ORAL at 21:23

## 2024-03-25 RX ADMIN — HEPARIN SODIUM 5000 UNIT(S): 5000 INJECTION INTRAVENOUS; SUBCUTANEOUS at 21:22

## 2024-03-25 RX ADMIN — PIPERACILLIN AND TAZOBACTAM 200 GRAM(S): 4; .5 INJECTION, POWDER, LYOPHILIZED, FOR SOLUTION INTRAVENOUS at 00:56

## 2024-03-25 RX ADMIN — Medication 650 MILLIGRAM(S): at 02:51

## 2024-03-25 RX ADMIN — LISINOPRIL 40 MILLIGRAM(S): 2.5 TABLET ORAL at 10:39

## 2024-03-25 RX ADMIN — Medication 30 MILLIGRAM(S): at 10:39

## 2024-03-25 NOTE — DIETITIAN INITIAL EVALUATION ADULT - ADD RECOMMEND
1. Recommend to ADAT to low-fiber as soon as medically feasible as per MD orders - if unable to advance diet within 24-48 hrs, please re-consult RD for nutrition support recs   2. Encourage protein-rich foods, maximize food preferences - encouraged bland foods, low-fiber diet and high-protein choices   3. Consider obtaining vitamin D 25OH level to assess nutriture   4. If diarrhea persists, consider adding Banatrol mixed w/ applesauce TID   5. Continue to monitor hydration status and lytes - consider starting IVF 2/2 hypernatremia and persistent diarrhea   6. Consider adding thiamine 100 mg daily 2/2 poor PO intake/ malnutrition and MVI w/ minerals daily to ensure 100% RDA met   7. Confirm goals of care regarding nutrition support   RD will continue to monitor PO intake, labs, hydration, and wt prn.  1. Recommend to ADAT to low-fiber as soon as medically feasible as per MD orders - if unable to advance diet within 24-48 hrs, please re-consult RD for nutrition support recs   2. Encourage protein-rich foods, maximize food preferences - encouraged bland foods, low-fiber diet and high-protein choices   3. Consider obtaining vitamin D 25OH level to assess nutriture   4. If diarrhea persists, consider adding Banatrol mixed w/ applesauce TID   5. Continue to monitor hydration status and lytes - consider starting IVF 2/2 hypernatremia and persistent diarrhea   6. Consider adding thiamine 100 mg daily 2/2 poor PO intake/ malnutrition and MVI w/ minerals daily to ensure 100% RDA met   7. Check serum zinc level for suspected deficiency. Consider adding 220 mg of zinc sulfate daily 2/2 persistent diarrhea   8. Confirm goals of care regarding nutrition support   RD will continue to monitor PO intake, labs, hydration, and wt prn.

## 2024-03-25 NOTE — DIETITIAN INITIAL EVALUATION ADULT - ORAL INTAKE PTA/DIET HISTORY
Lives at home w/ son and daughter who help w/ grocery shopping and cooking. Reports decreased PO intake intentional x > 1 mo 2/2 planned for hip surgery beginning of May, stated she was "cutting portions." Has been consuming 3 small meals/day, meeting <75% ENN x >1 mo. States loose stools are common for her 2/2 hx colitis, however, c/o more frequent diarrhea x past week and some abdominal pain present.

## 2024-03-25 NOTE — PROGRESS NOTE ADULT - SUBJECTIVE AND OBJECTIVE BOX
CHIEF COMPLAINT/INTERVAL HISTORY:    Patient is a 83y old  Female who presents with a chief complaint of diarrhea and abdominal pain   acute rectosigmoid proctocolitis (24 Mar 2024 20:53)      HPI:  83 year old female w CAD, DM II, HTN, HLD, HTN IBS, partial colon resection after  presents to  ED in wheelchair c/o generalized weakness,+diarrhea x 4-5 days.   +multiple episodes of watery diarrhea that is orange brown for 4-5 days; denied melena or blood   "diarrhea is like water with curdles in it"  +LLQ abdominal pain intermittent, moderate  +mild anorexia w reduced oral intake  In ED /57   HR 74   RR 18   T 98.1   100% sat RA  CT no obstruction + acute rectosigmoid proctocolitis  ceftriaxone   cc x 2    GI PCR + enteropathogen e coli    SUBJECTIVE & OBJECTIVE: Pt seen and examined at bedside.     ICU Vital Signs Last 24 Hrs  T(C): 36.4 (24 Mar 2024 23:03), Max: 36.7 (24 Mar 2024 14:48)  T(F): 97.6 (24 Mar 2024 23:03), Max: 98.1 (24 Mar 2024 14:48)  HR: 70 (24 Mar 2024 23:03) (65 - 74)  BP: 119/96 (24 Mar 2024 23:03) (109/57 - 126/86)  BP(mean): 100 (24 Mar 2024 23:03) (83 - 100)  ABP: --  ABP(mean): --  RR: 18 (24 Mar 2024 23:03) (16 - 18)  SpO2: 97% (24 Mar 2024 23:03) (95% - 100%)    O2 Parameters below as of 24 Mar 2024 23:03  Patient On (Oxygen Delivery Method): room air              MEDICATIONS  (STANDING):  atorvastatin 20 milliGRAM(s) Oral at bedtime  carvedilol 25 milliGRAM(s) Oral every 12 hours  dextrose 5%. 1000 milliLiter(s) (50 mL/Hr) IV Continuous <Continuous>  dextrose 5%. 1000 milliLiter(s) (100 mL/Hr) IV Continuous <Continuous>  dextrose 50% Injectable 25 Gram(s) IV Push once  dextrose 50% Injectable 12.5 Gram(s) IV Push once  dextrose 50% Injectable 25 Gram(s) IV Push once  glucagon  Injectable 1 milliGRAM(s) IntraMuscular once  hydrALAZINE 30 milliGRAM(s) Oral every 12 hours  insulin lispro (ADMELOG) corrective regimen sliding scale   SubCutaneous three times a day before meals  insulin lispro (ADMELOG) corrective regimen sliding scale   SubCutaneous at bedtime  levothyroxine 100 MICROGram(s) Oral daily  lisinopril 40 milliGRAM(s) Oral daily  montelukast 10 milliGRAM(s) Oral daily  piperacillin/tazobactam IVPB.- 3.375 Gram(s) IV Intermittent once  piperacillin/tazobactam IVPB.- 3.375 Gram(s) IV Intermittent once  piperacillin/tazobactam IVPB.. 3.375 Gram(s) IV Intermittent every 8 hours    MEDICATIONS  (PRN):  acetaminophen     Tablet .. 650 milliGRAM(s) Oral every 6 hours PRN Mild Pain (1 - 3)  aluminum hydroxide/magnesium hydroxide/simethicone Suspension 30 milliLiter(s) Oral every 4 hours PRN Dyspepsia  dextrose Oral Gel 15 Gram(s) Oral once PRN Blood Glucose LESS THAN 70 milliGRAM(s)/deciliter  diphenoxylate/atropine 1 Tablet(s) Oral four times a day PRN Diarrhea  melatonin 3 milliGRAM(s) Oral at bedtime PRN Insomnia  ondansetron Injectable 4 milliGRAM(s) IV Push every 6 hours PRN Nausea and/or Vomiting        PHYSICAL EXAM:      NERVOUS SYSTEM:  Alert & Oriented X3, Motor Strength 5/5 B/L upper and lower extremities; DTRs 2+ intact and symmetric  CHEST/LUNG: Clear to auscultation bilaterally; No rales, rhonchi, wheezing, or rubs  HEART: Regular rate and rhythm; No murmurs, rubs, or gallops  ABDOMEN: Soft, Nontender, Nondistended; Bowel sounds present  EXTREMITIES:  2+ Peripheral Pulses, No clubbing, cyanosis, or edema    LABS:                        12.7   9.88  )-----------( 276      ( 24 Mar 2024 16:25 )             38.1     03-24    147<H>  |  111<H>  |  36<H>  ----------------------------<  107<H>  4.2   |  24  |  0.82    Ca    10.2<H>      24 Mar 2024 16:25    TPro  6.8  /  Alb  3.3  /  TBili  0.6  /  DBili  x   /  AST  16  /  ALT  14  /  AlkPhos  89  03-24      Urinalysis Basic - ( 24 Mar 2024 16:25 )    Color: x / Appearance: x / SG: x / pH: x  Gluc: 107 mg/dL / Ketone: x  / Bili: x / Urobili: x   Blood: x / Protein: x / Nitrite: x   Leuk Esterase: x / RBC: x / WBC x   Sq Epi: x / Non Sq Epi: x / Bacteria: x        CAPILLARY BLOOD GLUCOSE      POCT Blood Glucose.: 105 mg/dL (25 Mar 2024 07:42)  POCT Blood Glucose.: 110 mg/dL (25 Mar 2024 00:22)      RECENT CULTURES:      RADIOLOGY & ADDITIONAL TESTS: personally reviewed      IMPRESSION:  Acute rectosigmoid proctocolitis.    No bowel obstruction.    Nonobstructing right nephrolithiasis.      #Acute rectosigmoid proctocolitis  #hx IBS  #Hx partial colon resection for diverticular abscess  1. admit to med  2. GI PCR noted  3. contact isolation   to zosyn        #CAD  #HLD  1. statin      #DM II  1. diet is clear  2. hold oral agent  3. BGM  3. ISS  4.  Hb A1c      #HTN  1. coreg  2. hydralazine  3. lisinopril      #Hypothyroid  1. levothyroxine        Left hip pain  bone on bone   1. ambulate w assistance  2. pt using a wheelchair too      #MISC  1. montelukast           CHIEF COMPLAINT/INTERVAL HISTORY:    Patient is a 83y old  Female who presents with a chief complaint of diarrhea and abdominal pain   acute rectosigmoid proctocolitis (24 Mar 2024 20:53)      HPI:  83 year old female w CAD, DM II, HTN, HLD, HTN IBS, partial colon resection after  presents to  ED in wheelchair c/o generalized weakness,+diarrhea x 4-5 days.   +multiple episodes of watery diarrhea that is orange brown for 4-5 days; denied melena or blood   "diarrhea is like water with curdles in it"  +LLQ abdominal pain intermittent, moderate  +mild anorexia w reduced oral intake  In ED /57   HR 74   RR 18   T 98.1   100% sat RA  CT no obstruction + acute rectosigmoid proctocolitis  ceftriaxone   cc x 2    GI PCR + enteropathogen e coli    SUBJECTIVE & OBJECTIVE: Pt seen and examined at bedside.     ICU Vital Signs Last 24 Hrs  T(C): 36.4 (24 Mar 2024 23:03), Max: 36.7 (24 Mar 2024 14:48)  T(F): 97.6 (24 Mar 2024 23:03), Max: 98.1 (24 Mar 2024 14:48)  HR: 70 (24 Mar 2024 23:03) (65 - 74)  BP: 119/96 (24 Mar 2024 23:03) (109/57 - 126/86)  BP(mean): 100 (24 Mar 2024 23:03) (83 - 100)  ABP: --  ABP(mean): --  RR: 18 (24 Mar 2024 23:03) (16 - 18)  SpO2: 97% (24 Mar 2024 23:03) (95% - 100%)    O2 Parameters below as of 24 Mar 2024 23:03  Patient On (Oxygen Delivery Method): room air              MEDICATIONS  (STANDING):  atorvastatin 20 milliGRAM(s) Oral at bedtime  carvedilol 25 milliGRAM(s) Oral every 12 hours  dextrose 5%. 1000 milliLiter(s) (50 mL/Hr) IV Continuous <Continuous>  dextrose 5%. 1000 milliLiter(s) (100 mL/Hr) IV Continuous <Continuous>  dextrose 50% Injectable 25 Gram(s) IV Push once  dextrose 50% Injectable 12.5 Gram(s) IV Push once  dextrose 50% Injectable 25 Gram(s) IV Push once  glucagon  Injectable 1 milliGRAM(s) IntraMuscular once  hydrALAZINE 30 milliGRAM(s) Oral every 12 hours  insulin lispro (ADMELOG) corrective regimen sliding scale   SubCutaneous three times a day before meals  insulin lispro (ADMELOG) corrective regimen sliding scale   SubCutaneous at bedtime  levothyroxine 100 MICROGram(s) Oral daily  lisinopril 40 milliGRAM(s) Oral daily  montelukast 10 milliGRAM(s) Oral daily  piperacillin/tazobactam IVPB.- 3.375 Gram(s) IV Intermittent once  piperacillin/tazobactam IVPB.- 3.375 Gram(s) IV Intermittent once  piperacillin/tazobactam IVPB.. 3.375 Gram(s) IV Intermittent every 8 hours    MEDICATIONS  (PRN):  acetaminophen     Tablet .. 650 milliGRAM(s) Oral every 6 hours PRN Mild Pain (1 - 3)  aluminum hydroxide/magnesium hydroxide/simethicone Suspension 30 milliLiter(s) Oral every 4 hours PRN Dyspepsia  dextrose Oral Gel 15 Gram(s) Oral once PRN Blood Glucose LESS THAN 70 milliGRAM(s)/deciliter  diphenoxylate/atropine 1 Tablet(s) Oral four times a day PRN Diarrhea  melatonin 3 milliGRAM(s) Oral at bedtime PRN Insomnia  ondansetron Injectable 4 milliGRAM(s) IV Push every 6 hours PRN Nausea and/or Vomiting        PHYSICAL EXAM:      NERVOUS SYSTEM:  Alert & Oriented X3, Motor Strength 5/5 B/L upper and lower extremities; DTRs 2+ intact and symmetric  CHEST/LUNG: Clear to auscultation bilaterally; No rales, rhonchi, wheezing, or rubs  HEART: Regular rate and rhythm; No murmurs, rubs, or gallops  ABDOMEN: Soft, Nontender, Nondistended; Bowel sounds present  EXTREMITIES:  2+ Peripheral Pulses, No clubbing, cyanosis, or edema    LABS:                        12.7   9.88  )-----------( 276      ( 24 Mar 2024 16:25 )             38.1     03-24    147<H>  |  111<H>  |  36<H>  ----------------------------<  107<H>  4.2   |  24  |  0.82    Ca    10.2<H>      24 Mar 2024 16:25    TPro  6.8  /  Alb  3.3  /  TBili  0.6  /  DBili  x   /  AST  16  /  ALT  14  /  AlkPhos  89  03-24      Urinalysis Basic - ( 24 Mar 2024 16:25 )    Color: x / Appearance: x / SG: x / pH: x  Gluc: 107 mg/dL / Ketone: x  / Bili: x / Urobili: x   Blood: x / Protein: x / Nitrite: x   Leuk Esterase: x / RBC: x / WBC x   Sq Epi: x / Non Sq Epi: x / Bacteria: x        CAPILLARY BLOOD GLUCOSE      POCT Blood Glucose.: 105 mg/dL (25 Mar 2024 07:42)  POCT Blood Glucose.: 110 mg/dL (25 Mar 2024 00:22)      RECENT CULTURES:      RADIOLOGY & ADDITIONAL TESTS: personally reviewed      IMPRESSION:  Acute rectosigmoid proctocolitis.    No bowel obstruction.    Nonobstructing right nephrolithiasis.      #Acute rectosigmoid proctocolitis  #hx IBS  #Hx partial colon resection for diverticular abscess   GI PCR noted   contact isolation  dc zosyn case d/w ID        #CAD  #HLD  1. statin      #DM II  1. diet is clear  2. hold oral agent  3. BGM  3. ISS  4.  Hb A1c 6.4      #HTN  1. coreg  2. hydralazine  3. lisinopril

## 2024-03-25 NOTE — CONSULT NOTE ADULT - ASSESSMENT
83 year old female w CAD, DM II, HTN, HLD, HTN IBS, partial colon resection admitted on 3/24 for evaluation of persistent diarrhea over preceding 4-5 days, that is nonbloody, and weakness; the patient did not travel, eat raw seafood or uncooked chicken, she lives with her daughter and son; her daughter works as  for [employer] and son is pharmacy tech at a nursing home; neither family member is sick, no known sick contacts. Denies fever chills or any other complaints, has been trying to lose weight in anticipation of left hip replacement on 5/11.    1. Patient admitted with diarrhea, found to have E coli enteropathogenic, possibly norovirus as well, though may be false reaction due to the E coli  - iv hydration and supportive care   - serial cbc and monitor temperature   - will stop the antibiotics as this is not recommended with the [facility] pathogens, given the risk of possibly inducing HUS syndrome  - aggressive hydration and supportive care are the mainstay of treatment along with bland diet  - continue isolation   - no family members are ill, unclear how the patient acquired this illness  2. other issues; per medicine    [facility] token is not applicable given that patient is not on antibiotics

## 2024-03-25 NOTE — PATIENT PROFILE ADULT - FALL HARM RISK - RISK INTERVENTIONS

## 2024-03-25 NOTE — PROVIDER CONTACT NOTE (CRITICAL VALUE NOTIFICATION) - TEST AND RESULT REPORTED:
GI PCR collected on 3/24/2024 - Detected for enteropathogenic E. Coli, norovirus G1, G2, indeterminant. GI PCR collected on 3/24/2024 - GI PCR Detected, Detected for enteropathogenic E. Coli, norovirus G1, G2, indeterminant.

## 2024-03-25 NOTE — DIETITIAN INITIAL EVALUATION ADULT - OTHER INFO
83 year old female w CAD, DM II, HTN, HLD, HTN IBS, partial colon resection after presents to ED in wheelchair c/o generalized weakness, +diarrhea x 4-5 days. +multiple episodes of watery diarrhea that is orange brown for 4-5 days; denied melena or blood. "diarrhea is like water with curdles in it". +LLQ abdominal pain intermittent, moderate. +mild anorexia w reduced oral intake. Admit for noninfectious gastroenteritis and colitis.     Presents w/ acute rectosigmoid proctocolitis w/ no obstruction noted as per hospitalist. ID following, PCR and CDiff results pending, was positive for E. coli, possibly norovirus as well, but "may be false reaction to E coli." Upon RD visit this morning, on consistent carb clear liquid diet, reports she is tolerating but "feels full quickly" and did not consume 100% of liquids on tray, consuming <50% ENN. Reports # x1 mo ago and states wt loss since likely. RD obtained bed scale wt 147# on 3/25, appears accurate but 1+ edema doc'd - likely skewing wt and masking further wt loss. Wt loss 12# / 7.5% x1 mo, started as intentional, then became unintentional - severe and clin sig. Appears frail w/ NFPE revealing moderate-severe muscle/fat wasting, swollen legs observed. Labs reviewed: POCTs x24 hrs WNL (150, 105, 110) and HgA1c 6.4% on 3/25 (considered good glycemic control for age), received no insulin x24 hrs. Hypernatremia noted, consider starting on IVF to maintain hydration status. Recommend to ADAT to low-fiber as soon as medically feasible as per MD orders to maximize caloric and protein intake, if unable to advance diet within 24-48 rs, please re-consult RD for nutrition support recs. Denied all ONS, RD encouraged PO intake when feasible, low-fiber/ bland foods, and high-protein choices, pt was receptive. If diarrhea persists, consider adding Banatrol mixed w/ applesauce TID. Full code - strongly recommend to confirm goals of care regarding nutrition support. See below for other recs.

## 2024-03-25 NOTE — DIETITIAN INITIAL EVALUATION ADULT - PERTINENT MEDS FT
MEDICATIONS  (STANDING):  atorvastatin 20 milliGRAM(s) Oral at bedtime  carvedilol 25 milliGRAM(s) Oral every 12 hours  chlorhexidine 4% Liquid 1 Application(s) Topical <User Schedule>  dextrose 5%. 1000 milliLiter(s) (100 mL/Hr) IV Continuous <Continuous>  dextrose 5%. 1000 milliLiter(s) (50 mL/Hr) IV Continuous <Continuous>  dextrose 50% Injectable 25 Gram(s) IV Push once  dextrose 50% Injectable 12.5 Gram(s) IV Push once  dextrose 50% Injectable 25 Gram(s) IV Push once  glucagon  Injectable 1 milliGRAM(s) IntraMuscular once  heparin   Injectable 5000 Unit(s) SubCutaneous every 12 hours  hydrALAZINE 30 milliGRAM(s) Oral every 12 hours  insulin lispro (ADMELOG) corrective regimen sliding scale   SubCutaneous three times a day before meals  insulin lispro (ADMELOG) corrective regimen sliding scale   SubCutaneous at bedtime  levothyroxine 100 MICROGram(s) Oral daily  lisinopril 40 milliGRAM(s) Oral daily  montelukast 10 milliGRAM(s) Oral at bedtime    MEDICATIONS  (PRN):  acetaminophen     Tablet .. 650 milliGRAM(s) Oral every 6 hours PRN Mild Pain (1 - 3)  aluminum hydroxide/magnesium hydroxide/simethicone Suspension 30 milliLiter(s) Oral every 4 hours PRN Dyspepsia  dextrose Oral Gel 15 Gram(s) Oral once PRN Blood Glucose LESS THAN 70 milliGRAM(s)/deciliter  diphenoxylate/atropine 1 Tablet(s) Oral four times a day PRN Diarrhea  melatonin 3 milliGRAM(s) Oral at bedtime PRN Insomnia  ondansetron Injectable 4 milliGRAM(s) IV Push every 6 hours PRN Nausea and/or Vomiting

## 2024-03-25 NOTE — DIETITIAN INITIAL EVALUATION ADULT - PERTINENT LABORATORY DATA
03-24    147<H>  |  111<H>  |  36<H>  ----------------------------<  107<H>  4.2   |  24  |  0.82    Ca    10.2<H>      24 Mar 2024 16:25    TPro  6.8  /  Alb  3.3  /  TBili  0.6  /  DBili  x   /  AST  16  /  ALT  14  /  AlkPhos  89  03-24  POCT Blood Glucose.: 150 mg/dL (03-25-24 @ 12:09)  A1C with Estimated Average Glucose Result: 6.4 % (03-25-24 @ 08:34)

## 2024-03-25 NOTE — DIETITIAN INITIAL EVALUATION ADULT - LAB (SPECIFY)
Consider obtaining vitamin D 25OH level to assess nutriture   Check serum zinc level for suspected deficiency

## 2024-03-25 NOTE — CONSULT NOTE ADULT - SUBJECTIVE AND OBJECTIVE BOX
HPI:  83 year old female w CAD, DM II, HTN, HLD, HTN IBS, partial colon resection admitted on 3/24 for evaluation of persistent diarrhea over preceding 4-5 days, that is nonbloody, and weakness; the patient did not travel, eat raw seafood or uncooked chicken, she lives with her daughter and son; her daughter works as  for [employer] and son is pharmacy tech at a nursing home; neither family member is sick, no known sick contacts. Denies fever chills or any other complaints, has been trying to lose weight in anticipation of left hip replacement on 5/11.             PAST MEDICAL HX  Anxiety   Arthritis   CAD (coronary artery disease)  Chronic GERD  Degenerative disc disease   Diabetes mellitus   Eczema   Frequent falls  Functional diarrhea   HLD hyperlipidemia   HTN hypertension   Hypothyroidism, adult  Hx sepsis   IBS (irritable bowel syndrome)  Lymphedema of both lower extremities  Neoplasm of uncertain behavior of skin   OAB (overactive bladder)   Neoplasm of uncertain behavior of skin         PAST SURGICAL HX  Abdominal Surgery partial colon resection 2010 for diverticular abscess (Dr. Nichole)  Cataract Surgery  Cholecystectomy  Hysterectomy  Neuroplasty Decompression Median Nerve At Carpal Tunnel  Tonsillectomy  Total Hip Replacement Right  Total Knee Replacement Right  History of Varicose Vein Ligation  Endoscopy and colonoscopy pt states 4 years ago + polyps      FAMILY HX  Cerebral thrombosis : Mother  congestive heart failure  : Father  heart attack : Father  Parkinson's disease : Mother        SOCIAL HX  never smoker  was using a cane but "I messed up my shoulders and am not going for a replacement "  L hip is bone on bone so using wheelchair          PMH: as above  PSH: as above  Meds: per reconciliation sheet, noted below  MEDICATIONS  (STANDING):  atorvastatin 20 milliGRAM(s) Oral at bedtime  carvedilol 25 milliGRAM(s) Oral every 12 hours  chlorhexidine 4% Liquid 1 Application(s) Topical <User Schedule>  dextrose 5%. 1000 milliLiter(s) (100 mL/Hr) IV Continuous <Continuous>  dextrose 5%. 1000 milliLiter(s) (50 mL/Hr) IV Continuous <Continuous>  dextrose 50% Injectable 25 Gram(s) IV Push once  dextrose 50% Injectable 12.5 Gram(s) IV Push once  dextrose 50% Injectable 25 Gram(s) IV Push once  glucagon  Injectable 1 milliGRAM(s) IntraMuscular once  heparin   Injectable 5000 Unit(s) SubCutaneous every 12 hours  hydrALAZINE 30 milliGRAM(s) Oral every 12 hours  insulin lispro (ADMELOG) corrective regimen sliding scale   SubCutaneous three times a day before meals  insulin lispro (ADMELOG) corrective regimen sliding scale   SubCutaneous at bedtime  levothyroxine 100 MICROGram(s) Oral daily  lisinopril 40 milliGRAM(s) Oral daily  montelukast 10 milliGRAM(s) Oral at bedtime    MEDICATIONS  (PRN):  acetaminophen     Tablet .. 650 milliGRAM(s) Oral every 6 hours PRN Mild Pain (1 - 3)  aluminum hydroxide/magnesium hydroxide/simethicone Suspension 30 milliLiter(s) Oral every 4 hours PRN Dyspepsia  dextrose Oral Gel 15 Gram(s) Oral once PRN Blood Glucose LESS THAN 70 milliGRAM(s)/deciliter  diphenoxylate/atropine 1 Tablet(s) Oral four times a day PRN Diarrhea  melatonin 3 milliGRAM(s) Oral at bedtime PRN Insomnia  ondansetron Injectable 4 milliGRAM(s) IV Push every 6 hours PRN Nausea and/or Vomiting    Allergies    Levaquin (Other)    Intolerances      Social: no smoking, no alcohol, no illegal drugs; no recent travel, no exposure to TB  FAMILY HISTORY:     no history of premature cardiovascular disease in first degree relatives  ROS: the patient denies fever, no chills, no HA, no dizziness, no sore throat, no blurry vision, no CP, no palpitations, no SOB, no cough, no abdominal pain, no N/V, no dysuria, no leg pain, no claudication, no rash, no joint aches, no rectal pain or bleeding, no night sweats  All other systems reviewed and are negative    Vital Signs Last 24 Hrs  T(C): 36.5 (25 Mar 2024 07:30), Max: 36.7 (24 Mar 2024 14:48)  T(F): 97.7 (25 Mar 2024 07:30), Max: 98.1 (24 Mar 2024 14:48)  HR: 67 (25 Mar 2024 07:30) (65 - 74)  BP: 117/86 (25 Mar 2024 07:30) (109/57 - 126/86)  BP(mean): 100 (24 Mar 2024 23:03) (83 - 100)  RR: 16 (25 Mar 2024 07:30) (16 - 18)  SpO2: 96% (25 Mar 2024 07:30) (95% - 100%)    Parameters below as of 25 Mar 2024 07:30  Patient On (Oxygen Delivery Method): room air      Daily Height in cm: 160.02 (24 Mar 2024 14:48)    Daily     PE:    Constitutional: frail looking  HEENT: NC/AT, EOMI, PERRLA, conjunctivae clear; ears and nose atraumatic; pharynx clear  Neck: supple; thyroid not palpable  Back: no tenderness  Respiratory: respiratory effort normal; clear to auscultation  Cardiovascular: S1S2 regular, no murmurs  Abdomen: soft, not tender, not distended, positive BS; no liver or spleen organomegaly  Genitourinary: no suprapubic tenderness  Musculoskeletal: no muscle tenderness, no joint swelling or tenderness  Neurological/ Psychiatric: AxOx3, judgement and insight normal;  moving all extremities  Skin: no rashes; no palpable lesions    Labs: all available labs reviewed                        11.9   8.23  )-----------( 248      ( 25 Mar 2024 08:34 )             35.9     03-24    147<H>  |  111<H>  |  36<H>  ----------------------------<  107<H>  4.2   |  24  |  0.82    Ca    10.2<H>      24 Mar 2024 16:25    TPro  6.8  /  Alb  3.3  /  TBili  0.6  /  DBili  x   /  AST  16  /  ALT  14  /  AlkPhos  89  03-24     LIVER FUNCTIONS - ( 24 Mar 2024 16:25 )  Alb: 3.3 g/dL / Pro: 6.8 gm/dL / ALK PHOS: 89 U/L / ALT: 14 U/L / AST: 16 U/L / GGT: x           Urinalysis Basic - ( 24 Mar 2024 16:25 )    Color: x / Appearance: x / SG: x / pH: x  Gluc: 107 mg/dL / Ketone: x  / Bili: x / Urobili: x   Blood: x / Protein: x / Nitrite: x   Leuk Esterase: x / RBC: x / WBC x   Sq Epi: x / Non Sq Epi: x / Bacteria: x    gi pcr: enterpathogenic Ecoli present  Norovirus- indeterminate                  < from: CT Abdomen and Pelvis w/ Oral Cont and w/ IV Cont (03.24.24 @ 18:08) >    ACC: [id number] EXAM:  CT ABDOMEN AND PELVIS OC IC   ORDERED BY: SHAINA LUNA     PROCEDURE DATE:  03/24/2024          INTERPRETATION:  CLINICAL INFORMATION: Evaluation for diverticulitis   diarrhea since 4 days, weakness. Partial colonic resection 2010 for   abscess    COMPARISON: CT abdomen and pelvis of 2/20/2013.    CONTRAST/COMPLICATIONS:  IV Contrast: Omnipaque 350  90 cc administered   0 cc discarded  Oral Contrast: Omnipaque 300  Complications: None reported at time of study completion    PROCEDURE:  CT of the Abdomen and Pelvis was performed.  Sagittal and coronal reformats were performed.    FINDINGS:  LOWER CHEST: Mild cardiomegaly. Coronary artery and aortic   calcifications. Small hiatal hernia. Lung bases are clear.    LIVER:7 mm hypodensity at the tip of segment 6, too small to   characterize, not visualized on prior CT 2013.  BILE DUCTS: Normal caliber.  GALLBLADDER: Cholecystectomy.  SPLEEN: Within normal limits.  PANCREAS: Advanced fatty lobulation and pancreatic atrophy. No obvious   pancreatic duct dilatation.  ADRENALS: Within normal limits.  KIDNEYS/URETERS: Bilateral renal cysts. Numerous nonobstructing renal   calculi in the right kidney lower pole up to 5 mm.    BLADDER: Distended but otherwise limited forevaluation due to overlying   artifacts.  REPRODUCTIVE ORGANS: Hysterectomy, no adnexal mass.    BOWEL: Small hiatal hernia. Stomach, duodenum and small bowel containing   oral contrast are otherwise normal in appearance. Contrast has reached   the rectum. There is no bowel obstruction. Appendix is not visualized. No   obvious pericecal inflammation. There is diffuse wall thickening   involving the sigmoid colon and rectum with several diverticula, which   however are not individually inflamed. There is mild submucosal edema and   a small amount of periintestinal edema involving the sigmoid and a small   amount of fluid in the pelvis adjacent to the sigmoid colon. No colonic   obstruction.  PERITONEUM: Small amount of free pelvic fluid. No pneumoperitoneum.  VESSELS: Atherosclerotic changes. Hepatic veins, portal vein, SMV, renal   veins and IVC are patent.  RETROPERITONEUM/LYMPH NODES: No lymphadenopathy.  ABDOMINAL WALL: Within normal limits.  BONES: Right-sided total hip replacement with lytic changes involving the   issue tuberosity, appearance is however similar to prior imaging   12/2/2013. Grade 1 anterolisthesis of L4 over L5. Partial vertebral   fusion L1 to. Multilevel discogenic degenerative disease.    IMPRESSION:  Acute rectosigmoid proctocolitis.    No bowel obstruction.    Nonobstructing right nephrolithiasis.    < end of copied text >        Radiology: all available radiological tests reviewed    Advanced directives addressed: full resuscitation

## 2024-03-25 NOTE — PROVIDER CONTACT NOTE (CRITICAL VALUE NOTIFICATION) - PERSON GIVING RESULT:
Bedside report received from day RN. Assumed care of pt at 1900. Pt is on 8L of O2 via oxy mask. Pt has tele box in place. No s/s of pain or discomfort. Educated to call light. Bed is in low and locked position. Will continue to monitor.   Elsa Carey

## 2024-03-25 NOTE — DIETITIAN INITIAL EVALUATION ADULT - ETIOLOGY
r/t decreased ability to meet estimated nutrient needs 2/2 decreased PO intake w/ persistent diarrhea on advanced age w/ dietary restrictions

## 2024-03-26 ENCOUNTER — TRANSCRIPTION ENCOUNTER (OUTPATIENT)
Age: 84
End: 2024-03-26

## 2024-03-26 VITALS
DIASTOLIC BLOOD PRESSURE: 72 MMHG | RESPIRATION RATE: 18 BRPM | OXYGEN SATURATION: 100 % | SYSTOLIC BLOOD PRESSURE: 146 MMHG | TEMPERATURE: 97 F | HEART RATE: 63 BPM

## 2024-03-26 LAB
GLUCOSE BLDC GLUCOMTR-MCNC: 135 MG/DL — HIGH (ref 70–99)
GLUCOSE BLDC GLUCOMTR-MCNC: 138 MG/DL — HIGH (ref 70–99)
GLUCOSE BLDC GLUCOMTR-MCNC: 151 MG/DL — HIGH (ref 70–99)

## 2024-03-26 PROCEDURE — 99238 HOSP IP/OBS DSCHRG MGMT 30/<: CPT

## 2024-03-26 RX ORDER — DIPHENOXYLATE HCL/ATROPINE 2.5-.025MG
2 TABLET ORAL
Refills: 0 | DISCHARGE

## 2024-03-26 RX ADMIN — Medication 30 MILLIGRAM(S): at 10:49

## 2024-03-26 RX ADMIN — SODIUM CHLORIDE 80 MILLILITER(S): 9 INJECTION INTRAMUSCULAR; INTRAVENOUS; SUBCUTANEOUS at 05:24

## 2024-03-26 RX ADMIN — Medication 100 MICROGRAM(S): at 05:52

## 2024-03-26 RX ADMIN — Medication 650 MILLIGRAM(S): at 02:00

## 2024-03-26 RX ADMIN — CHLORHEXIDINE GLUCONATE 1 APPLICATION(S): 213 SOLUTION TOPICAL at 05:57

## 2024-03-26 RX ADMIN — Medication 650 MILLIGRAM(S): at 01:31

## 2024-03-26 RX ADMIN — LISINOPRIL 40 MILLIGRAM(S): 2.5 TABLET ORAL at 10:49

## 2024-03-26 RX ADMIN — HEPARIN SODIUM 5000 UNIT(S): 5000 INJECTION INTRAVENOUS; SUBCUTANEOUS at 10:48

## 2024-03-26 RX ADMIN — Medication 3 MILLIGRAM(S): at 01:31

## 2024-03-26 RX ADMIN — CARVEDILOL PHOSPHATE 25 MILLIGRAM(S): 80 CAPSULE, EXTENDED RELEASE ORAL at 10:50

## 2024-03-26 NOTE — DISCHARGE NOTE PROVIDER - NSDCFUADDAPPT_GEN_ALL_CORE_FT
APPTS ARE READY TO BE MADE: [X] YES    Best Family or Patient Contact (if needed):    Additional Information about above appointments (if needed):    1:   2:  3:  APPTS ARE READY TO BE MADE: [X] YES    Best Family or Patient Contact (if needed):    Additional Information about above appointments (if needed):    1:   2:  3:     Prior to outreaching the patient, it was visible that the patient has secured a follow up appointment which was not scheduled by our team. Patient is scheduled with Dr. Patel 4/18/24 3:00pm [address] [place] [address]

## 2024-03-26 NOTE — DISCHARGE NOTE NURSING/CASE MANAGEMENT/SOCIAL WORK - NSDCFUADDAPPT_GEN_ALL_CORE_FT
APPTS ARE READY TO BE MADE: [X] YES    Best Family or Patient Contact (if needed):    Additional Information about above appointments (if needed):    1:   2:  3:     Prior to outreaching the patient, it was visible that the patient has secured a follow up appointment which was not scheduled by our team. Patient is scheduled with Dr. Patel 4/18/24 3:00pm [address] [place] [address]

## 2024-03-26 NOTE — DISCHARGE NOTE PROVIDER - NSDCMRMEDTOKEN_GEN_ALL_CORE_FT
atorvastatin 20 mg oral tablet: 1 tab(s) orally once a day (at bedtime)  carvedilol 25 mg oral tablet: 1 tab(s) orally 2 times a day  hydrALAZINE 10 mg oral tablet: 2 tab(s) orally once a day (in the morning)  hydrALAZINE 10 mg oral tablet: 3 tab(s) orally once a day (in the evening)  Janumet 50 mg-1000 mg oral tablet: 1 tab(s) orally 2 times a day  levothyroxine 100 mcg (0.1 mg) oral tablet: 1 tab(s) orally once a day  lisinopril 40 mg oral tablet: 1 tab(s) orally 2 times a day  montelukast 10 mg oral tablet: 1 tab(s) orally once a day

## 2024-03-26 NOTE — DISCHARGE NOTE NURSING/CASE MANAGEMENT/SOCIAL WORK - NSDCPEFALRISK_GEN_ALL_CORE
For information on Fall & Injury Prevention, visit: [email or web address] OR  [email or web address] OR  https://www.cdc.gov/steadi/patient.html

## 2024-03-26 NOTE — DISCHARGE NOTE NURSING/CASE MANAGEMENT/SOCIAL WORK - PATIENT PORTAL LINK FT
You can access the FollowMyHealth Patient Portal offered by [facility] by registering at the following website: [email or web address]. By joining [facility]’s FollowMyHealth portal, you will also be able to view your health information using other applications (apps) compatible with our system.

## 2024-03-26 NOTE — DISCHARGE NOTE PROVIDER - HOSPITAL COURSE
83 year old female w CAD, DM II, HTN, HLD, HTN IBS, partial colon resection after  presents to  ED in wheelchair c/o generalized weakness,+diarrhea x 4-5 days.   +multiple episodes of watery diarrhea that is orange brown for 4-5 days; denied melena or blood   "diarrhea is like water with curdles in it"  +LLQ abdominal pain intermittent, moderate  +mild anorexia w reduced oral intake  In ED /57   HR 74   RR 18   T 98.1   100% sat RA  CT no obstruction + acute rectosigmoid proctocolitis  ceftriaxone   cc x 2    GI PCR + enteropathogen e coli      Vital Signs Last 24 Hrs  T(C): 36.3 (26 Mar 2024 09:41), Max: 36.7 (25 Mar 2024 21:15)  T(F): 97.4 (26 Mar 2024 09:41), Max: 98 (25 Mar 2024 21:15)  HR: 63 (26 Mar 2024 09:41) (60 - 65)  BP: 146/72 (26 Mar 2024 09:41) (109/59 - 146/72)  BP(mean): --  RR: 18 (26 Mar 2024 09:41) (17 - 18)  SpO2: 100% (26 Mar 2024 09:41) (95% - 100%)    Parameters below as of 26 Mar 2024 09:41  Patient On (Oxygen Delivery Method): room air    Acute rectosigmoid proctocolitis  #hx IBS  #Hx partial colon resection for diverticular abscess   GI PCR noted  dc zosyn case d/w ID        #CAD  #HLD  1. statin      #DM II  1. diet is clear  2. hold oral agent  3. BGM  3. ISS  4.  Hb A1c 6.4      #HTN  1. coreg  2. hydralazine  3. lisinopril  
No

## 2024-03-26 NOTE — DISCHARGE NOTE PROVIDER - DETAILS OF MALNUTRITION DIAGNOSIS/DIAGNOSES
This patient has been assessed with a concern for Malnutrition and was treated during this hospitalization for the following Nutrition diagnosis/diagnoses:     -  03/25/2024: Severe protein-calorie malnutrition

## 2024-03-26 NOTE — DISCHARGE NOTE PROVIDER - CARE PROVIDER_API CALL
Norman Patel  [facility]  [address] [place] [address]  Phone: [phone]  Fax: [phone]  Follow Up Time: 1 week

## 2024-03-26 NOTE — DISCHARGE NOTE PROVIDER - NSDCCPCAREPLAN_GEN_ALL_CORE_FT
PRINCIPAL DISCHARGE DIAGNOSIS  Diagnosis: Colitis  Assessment and Plan of Treatment: with diarrhea resolved

## 2024-03-26 NOTE — DISCHARGE NOTE PROVIDER - NSDCFUSCHEDAPPT_GEN_ALL_CORE_FT
Debra Mccartney  [facility]  [address] [address] [facility] [address]  Scheduled Appointment: 04/17/2024    [facility]  HNT PreAdmits  Scheduled Appointment: 04/17/2024    Norman Patel  [facility]  FAMILYMED [address] [facility] [address]  Scheduled Appointment: 04/18/2024    [facility]  ON[address]O [address]  Scheduled Appointment: 05/02/2024    [facility]  HNT PreAdmits  Scheduled Appointment: 05/02/2024    [facility]  [address]  Scheduled Appointment: 06/03/2024

## 2024-03-27 ENCOUNTER — NON-APPOINTMENT (OUTPATIENT)
Age: 84
End: 2024-03-27

## 2024-03-28 ENCOUNTER — APPOINTMENT (OUTPATIENT)
Dept: FAMILY MEDICINE | Facility: CLINIC | Age: 84
End: 2024-03-28
Payer: MEDICARE

## 2024-03-28 DIAGNOSIS — A04.4 OTHER INTESTINAL ESCHERICHIA COLI INFECTIONS: ICD-10-CM

## 2024-03-28 PROCEDURE — 99214 OFFICE O/P EST MOD 30 MIN: CPT

## 2024-03-28 NOTE — HISTORY OF PRESENT ILLNESS
[Medical Office: ([facility])___] : at the medical office located in  [Home] : at home, [unfilled] , at the time of the visit. [FreeTextEntry8] : Patient seen at [facility] 3 days ago with persistent diarrhea several times a day not bloody patient has history of colon resection stool culture grew Enterocologenic E. coli CAT scan showed acute proctocolitis.  Patient continues to have diarrhea has had 3 episodes in the past 6 hours she is able to keep down fluids.  She is in no apparent distress no fever no complaint of pain  Will treat with Augmentin 500 3 times daily for 5 days continue drinking Pedialyte if fails to improve will return to emergency room [Verbal consent obtained from patient] : the patient, [unfilled]

## 2024-04-04 DIAGNOSIS — A04.0 ENTEROPATHOGENIC ESCHERICHIA COLI INFECTION: ICD-10-CM

## 2024-04-04 DIAGNOSIS — M25.552 PAIN IN LEFT HIP: ICD-10-CM

## 2024-04-04 DIAGNOSIS — I10 ESSENTIAL (PRIMARY) HYPERTENSION: ICD-10-CM

## 2024-04-04 DIAGNOSIS — E03.9 HYPOTHYROIDISM, UNSPECIFIED: ICD-10-CM

## 2024-04-04 DIAGNOSIS — E78.5 HYPERLIPIDEMIA, UNSPECIFIED: ICD-10-CM

## 2024-04-04 DIAGNOSIS — Z79.84 LONG TERM (CURRENT) USE OF ORAL HYPOGLYCEMIC DRUGS: ICD-10-CM

## 2024-04-04 DIAGNOSIS — Z96.641 PRESENCE OF RIGHT ARTIFICIAL HIP JOINT: ICD-10-CM

## 2024-04-04 DIAGNOSIS — I25.10 ATHEROSCLEROTIC HEART DISEASE OF NATIVE CORONARY ARTERY WITHOUT ANGINA PECTORIS: ICD-10-CM

## 2024-04-04 DIAGNOSIS — Z90.49 ACQUIRED ABSENCE OF OTHER SPECIFIED PARTS OF DIGESTIVE TRACT: ICD-10-CM

## 2024-04-04 DIAGNOSIS — E11.9 TYPE 2 DIABETES MELLITUS WITHOUT COMPLICATIONS: ICD-10-CM

## 2024-04-04 DIAGNOSIS — G47.00 INSOMNIA, UNSPECIFIED: ICD-10-CM

## 2024-04-04 DIAGNOSIS — Z96.651 PRESENCE OF RIGHT ARTIFICIAL KNEE JOINT: ICD-10-CM

## 2024-04-04 DIAGNOSIS — E43 UNSPECIFIED SEVERE PROTEIN-CALORIE MALNUTRITION: ICD-10-CM

## 2024-04-17 ENCOUNTER — APPOINTMENT (OUTPATIENT)
Dept: CARDIOLOGY | Facility: CLINIC | Age: 84
End: 2024-04-17
Payer: MEDICARE

## 2024-04-17 ENCOUNTER — NON-APPOINTMENT (OUTPATIENT)
Age: 84
End: 2024-04-17

## 2024-04-17 ENCOUNTER — RESULT REVIEW (OUTPATIENT)
Age: 84
End: 2024-04-17

## 2024-04-17 ENCOUNTER — OUTPATIENT (OUTPATIENT)
Dept: OUTPATIENT SERVICES | Facility: HOSPITAL | Age: 84
LOS: 1 days | End: 2024-04-17
Payer: MEDICARE

## 2024-04-17 VITALS
OXYGEN SATURATION: 98 % | BODY MASS INDEX: 28.17 KG/M2 | HEIGHT: 63 IN | SYSTOLIC BLOOD PRESSURE: 126 MMHG | WEIGHT: 159 LBS | HEART RATE: 68 BPM | DIASTOLIC BLOOD PRESSURE: 74 MMHG

## 2024-04-17 VITALS
RESPIRATION RATE: 16 BRPM | DIASTOLIC BLOOD PRESSURE: 67 MMHG | OXYGEN SATURATION: 98 % | HEIGHT: 62 IN | TEMPERATURE: 98 F | SYSTOLIC BLOOD PRESSURE: 130 MMHG | WEIGHT: 160.94 LBS | HEART RATE: 66 BPM

## 2024-04-17 DIAGNOSIS — Z98.890 OTHER SPECIFIED POSTPROCEDURAL STATES: Chronic | ICD-10-CM

## 2024-04-17 DIAGNOSIS — Z90.49 ACQUIRED ABSENCE OF OTHER SPECIFIED PARTS OF DIGESTIVE TRACT: Chronic | ICD-10-CM

## 2024-04-17 DIAGNOSIS — Z90.710 ACQUIRED ABSENCE OF BOTH CERVIX AND UTERUS: Chronic | ICD-10-CM

## 2024-04-17 DIAGNOSIS — G47.33 OBSTRUCTIVE SLEEP APNEA (ADULT) (PEDIATRIC): Chronic | ICD-10-CM

## 2024-04-17 DIAGNOSIS — Z85.828 PERSONAL HISTORY OF OTHER MALIGNANT NEOPLASM OF SKIN: Chronic | ICD-10-CM

## 2024-04-17 DIAGNOSIS — Z98.49 CATARACT EXTRACTION STATUS, UNSPECIFIED EYE: Chronic | ICD-10-CM

## 2024-04-17 DIAGNOSIS — Z01.818 ENCOUNTER FOR OTHER PREPROCEDURAL EXAMINATION: ICD-10-CM

## 2024-04-17 DIAGNOSIS — I87.2 VENOUS INSUFFICIENCY (CHRONIC) (PERIPHERAL): Chronic | ICD-10-CM

## 2024-04-17 DIAGNOSIS — Z96.641 PRESENCE OF RIGHT ARTIFICIAL HIP JOINT: Chronic | ICD-10-CM

## 2024-04-17 DIAGNOSIS — Z01.810 ENCOUNTER FOR PREPROCEDURAL CARDIOVASCULAR EXAMINATION: ICD-10-CM

## 2024-04-17 DIAGNOSIS — Z96.651 PRESENCE OF RIGHT ARTIFICIAL KNEE JOINT: Chronic | ICD-10-CM

## 2024-04-17 DIAGNOSIS — Z29.9 ENCOUNTER FOR PROPHYLACTIC MEASURES, UNSPECIFIED: ICD-10-CM

## 2024-04-17 DIAGNOSIS — Z90.89 ACQUIRED ABSENCE OF OTHER ORGANS: Chronic | ICD-10-CM

## 2024-04-17 DIAGNOSIS — M16.12 UNILATERAL PRIMARY OSTEOARTHRITIS, LEFT HIP: ICD-10-CM

## 2024-04-17 DIAGNOSIS — E78.5 HYPERLIPIDEMIA, UNSPECIFIED: ICD-10-CM

## 2024-04-17 LAB
ALBUMIN SERPL ELPH-MCNC: 3.4 G/DL — SIGNIFICANT CHANGE UP (ref 3.3–5)
ANION GAP SERPL CALC-SCNC: 8 MMOL/L — SIGNIFICANT CHANGE UP (ref 5–17)
BASOPHILS # BLD AUTO: 0.07 K/UL — SIGNIFICANT CHANGE UP (ref 0–0.2)
BASOPHILS NFR BLD AUTO: 0.8 % — SIGNIFICANT CHANGE UP (ref 0–2)
BUN SERPL-MCNC: 11 MG/DL — SIGNIFICANT CHANGE UP (ref 7–23)
CALCIUM SERPL-MCNC: 10 MG/DL — SIGNIFICANT CHANGE UP (ref 8.5–10.1)
CHLORIDE SERPL-SCNC: 109 MMOL/L — HIGH (ref 96–108)
CO2 SERPL-SCNC: 25 MMOL/L — SIGNIFICANT CHANGE UP (ref 22–31)
CREAT SERPL-MCNC: 0.68 MG/DL — SIGNIFICANT CHANGE UP (ref 0.5–1.3)
EGFR: 85 ML/MIN/1.73M2 — SIGNIFICANT CHANGE UP
EOSINOPHIL # BLD AUTO: 0.2 K/UL — SIGNIFICANT CHANGE UP (ref 0–0.5)
EOSINOPHIL NFR BLD AUTO: 2.4 % — SIGNIFICANT CHANGE UP (ref 0–6)
GLUCOSE SERPL-MCNC: 130 MG/DL — HIGH (ref 70–99)
HCT VFR BLD CALC: 37.5 % — SIGNIFICANT CHANGE UP (ref 34.5–45)
HGB BLD-MCNC: 12.3 G/DL — SIGNIFICANT CHANGE UP (ref 11.5–15.5)
IMM GRANULOCYTES NFR BLD AUTO: 0.7 % — SIGNIFICANT CHANGE UP (ref 0–0.9)
INR BLD: 1.08 RATIO — SIGNIFICANT CHANGE UP (ref 0.85–1.18)
LYMPHOCYTES # BLD AUTO: 2.09 K/UL — SIGNIFICANT CHANGE UP (ref 1–3.3)
LYMPHOCYTES # BLD AUTO: 25.1 % — SIGNIFICANT CHANGE UP (ref 13–44)
MCHC RBC-ENTMCNC: 31 PG — SIGNIFICANT CHANGE UP (ref 27–34)
MCHC RBC-ENTMCNC: 32.8 GM/DL — SIGNIFICANT CHANGE UP (ref 32–36)
MCV RBC AUTO: 94.5 FL — SIGNIFICANT CHANGE UP (ref 80–100)
MONOCYTES # BLD AUTO: 0.62 K/UL — SIGNIFICANT CHANGE UP (ref 0–0.9)
MONOCYTES NFR BLD AUTO: 7.4 % — SIGNIFICANT CHANGE UP (ref 2–14)
NEUTROPHILS # BLD AUTO: 5.3 K/UL — SIGNIFICANT CHANGE UP (ref 1.8–7.4)
NEUTROPHILS NFR BLD AUTO: 63.6 % — SIGNIFICANT CHANGE UP (ref 43–77)
PLATELET # BLD AUTO: 259 K/UL — SIGNIFICANT CHANGE UP (ref 150–400)
POTASSIUM SERPL-MCNC: 3.9 MMOL/L — SIGNIFICANT CHANGE UP (ref 3.5–5.3)
POTASSIUM SERPL-SCNC: 3.9 MMOL/L — SIGNIFICANT CHANGE UP (ref 3.5–5.3)
PROTHROM AB SERPL-ACNC: 12.2 SEC — SIGNIFICANT CHANGE UP (ref 9.5–13)
RBC # BLD: 3.97 M/UL — SIGNIFICANT CHANGE UP (ref 3.8–5.2)
RBC # FLD: 15.6 % — HIGH (ref 10.3–14.5)
SODIUM SERPL-SCNC: 142 MMOL/L — SIGNIFICANT CHANGE UP (ref 135–145)
WBC # BLD: 8.34 K/UL — SIGNIFICANT CHANGE UP (ref 3.8–10.5)
WBC # FLD AUTO: 8.34 K/UL — SIGNIFICANT CHANGE UP (ref 3.8–10.5)

## 2024-04-17 PROCEDURE — 80048 BASIC METABOLIC PNL TOTAL CA: CPT

## 2024-04-17 PROCEDURE — 83036 HEMOGLOBIN GLYCOSYLATED A1C: CPT

## 2024-04-17 PROCEDURE — 71046 X-RAY EXAM CHEST 2 VIEWS: CPT

## 2024-04-17 PROCEDURE — 99214 OFFICE O/P EST MOD 30 MIN: CPT | Mod: 25

## 2024-04-17 PROCEDURE — 85610 PROTHROMBIN TIME: CPT

## 2024-04-17 PROCEDURE — 87641 MR-STAPH DNA AMP PROBE: CPT

## 2024-04-17 PROCEDURE — 93000 ELECTROCARDIOGRAM COMPLETE: CPT

## 2024-04-17 PROCEDURE — 36415 COLL VENOUS BLD VENIPUNCTURE: CPT

## 2024-04-17 PROCEDURE — 71046 X-RAY EXAM CHEST 2 VIEWS: CPT | Mod: 26

## 2024-04-17 PROCEDURE — 87640 STAPH A DNA AMP PROBE: CPT

## 2024-04-17 PROCEDURE — 85025 COMPLETE CBC W/AUTO DIFF WBC: CPT

## 2024-04-17 PROCEDURE — 82040 ASSAY OF SERUM ALBUMIN: CPT

## 2024-04-17 RX ORDER — SITAGLIPTIN AND METFORMIN HYDROCHLORIDE 500; 50 MG/1; MG/1
1 TABLET, FILM COATED ORAL
Refills: 0 | DISCHARGE

## 2024-04-17 RX ORDER — LISINOPRIL 2.5 MG/1
1 TABLET ORAL
Refills: 0 | DISCHARGE

## 2024-04-17 RX ORDER — ATORVASTATIN CALCIUM 80 MG/1
1 TABLET, FILM COATED ORAL
Refills: 0 | DISCHARGE

## 2024-04-17 RX ORDER — LEVOTHYROXINE SODIUM 125 MCG
1 TABLET ORAL
Refills: 0 | DISCHARGE

## 2024-04-17 RX ORDER — MONTELUKAST 4 MG/1
1 TABLET, CHEWABLE ORAL
Refills: 0 | DISCHARGE

## 2024-04-17 RX ORDER — CARVEDILOL PHOSPHATE 80 MG/1
1 CAPSULE, EXTENDED RELEASE ORAL
Refills: 0 | DISCHARGE

## 2024-04-17 RX ORDER — HYDRALAZINE HCL 50 MG
2 TABLET ORAL
Refills: 0 | DISCHARGE

## 2024-04-17 RX ORDER — QUINAPRIL HYDROCHLORIDE 40 MG/1
40 TABLET, FILM COATED ORAL
Qty: 180 | Refills: 3 | Status: DISCONTINUED | COMMUNITY
Start: 2019-05-14 | End: 2024-04-17

## 2024-04-17 RX ORDER — HYDRALAZINE HCL 50 MG
3 TABLET ORAL
Refills: 0 | DISCHARGE

## 2024-04-17 NOTE — H&P PST ADULT - HISTORY OF PRESENT ILLNESS
83 years old female with osteoarthritis of left hip. Reports progressive pain to left hip. Xray done  12/23. Referred to orthopedist. Cortisone injection 1/24. Second opinion with Dr. Vides. Uses a cane to help with ambulation. Fell last 8/23.  Planned left total hip replacement.

## 2024-04-17 NOTE — H&P PST ADULT - NSICDXPASTSURGICALHX_GEN_ALL_CORE_FT
PAST SURGICAL HISTORY:  H/O total knee replacement, right     H/O varicose vein stripping     History of basal cell carcinoma     History of basal cell carcinoma excision     History of bilateral carpal tunnel release     History of cataract surgery     History of cholecystectomy     History of colon resection     History of cystoscopy     History of hysterectomy     History of left knee surgery     History of right hip replacement     History of tonsillectomy     Venous insufficiency of lower extremity

## 2024-04-17 NOTE — HISTORY OF PRESENT ILLNESS
[FreeTextEntry1] : Pt is an 84 y/o F PMH non-obstructive CAD (cardiac cath about 2011 at [facility], Dr Ramirez, as per pt), HTN, HLD, NIDDM, ESPERANZA, chronic LE edema, IBS.    She is scheduled for L hip surgery 05/02/2024 with Dr Vides at  Pt reports feeling well and has no active cardiac complaints - denies CP, SOB, palpitations, dizziness, syncope, edema, orthopnea, PND, orthopnea.  No exertional symptoms.  Ambulates with walker/wheelchair - no new falls since last OV  TTE 6/2017 shows normal LV function with mild TR/AI, min MR.  TTE 01/2020 EF 55-60%, mild AI/TR, mild DD  Pharm Nuc stress test 12/2021 small, mild defect in apical ant wall that is reversible

## 2024-04-17 NOTE — H&P PST ADULT - NSICDXFAMILYHX_GEN_ALL_CORE_FT
FAMILY HISTORY:  Father  Still living? No  Family history of bladder cancer, Age at diagnosis: Age Unknown  Family history of heart attack, Age at diagnosis: Age Unknown    Mother  Still living? No  FHx: Parkinson's disease, Age at diagnosis: Age Unknown    Sibling  Still living? No  Family history of pancreatic cancer, Age at diagnosis: Age Unknown

## 2024-04-17 NOTE — PHYSICAL EXAM
[Well Developed] : well developed [Well Nourished] : well nourished [No Acute Distress] : no acute distress [Normal Conjunctiva] : normal conjunctiva [Normal Venous Pressure] : normal venous pressure [No Carotid Bruit] : no carotid bruit [Normal S1, S2] : normal S1, S2 [No Murmur] : no murmur [No Rub] : no rub [No Gallop] : no gallop [Clear Lung Fields] : clear lung fields [Good Air Entry] : good air entry [No Respiratory Distress] : no respiratory distress  [Soft] : abdomen soft [Non Tender] : non-tender [No Masses/organomegaly] : no masses/organomegaly [Normal Bowel Sounds] : normal bowel sounds [No Cyanosis] : no cyanosis [No Clubbing] : no clubbing [No Varicosities] : no varicosities [No Rash] : no rash [No Skin Lesions] : no skin lesions [Moves all extremities] : moves all extremities [No Focal Deficits] : no focal deficits [Normal Speech] : normal speech [Alert and Oriented] : alert and oriented [Normal memory] : normal memory [de-identified] : ambulates with walker/wheelchair [de-identified] : b/l LE chronic edema

## 2024-04-17 NOTE — H&P PST ADULT - NSICDXPASTMEDICALHX_GEN_ALL_CORE_FT
PAST MEDICAL HISTORY:  Anxiety     CTS (carpal tunnel syndrome)     DDD (degenerative disc disease), lumbosacral     Diverticular disease     Fibrocystic breast     History of falling     History of hearing loss     History of sepsis     History of shortness of breath     History of tinnitus     HLD (hyperlipidemia)     HTN (hypertension)     Humeral head fracture     Irritable bowel syndrome (IBS)     ESPERANZA (obstructive sleep apnea)     Rotator cuff tear     S/P right hip fracture     Type II diabetes mellitus      PAST MEDICAL HISTORY:  Anxiety     CAD (coronary artery disease)     CTS (carpal tunnel syndrome)     DDD (degenerative disc disease), lumbosacral     Diverticular disease     Fibrocystic breast     History of falling     History of hearing loss     History of sepsis     History of shortness of breath     History of tinnitus     HLD (hyperlipidemia)     HTN (hypertension)     Humeral head fracture     Irritable bowel syndrome (IBS)     ESPERANZA (obstructive sleep apnea)     Rotator cuff tear     S/P right hip fracture     Type II diabetes mellitus     Urinary incontinence

## 2024-04-17 NOTE — H&P PST ADULT - ASSESSMENT
83 years old female present to PST accompanied by daughter prior to left total hip replacement with Dr. Vides.    Patient will require a rolling walker at home due to their dx of osteoarthritis of hip to help complete the MRADL's.    Patient denies signs and symptoms of Covid 19 such as shortness of breath/ difficulty breathing, fever/chills, cough, muscle /body ache, headache, loss of taste or smell.    Plan   1. Education and Instructions given to patient regarding upcoming surgery  2. NPO as per Dr. Vides  3. Take the following medications with sips of water on the day of procedure: Accupril, Hydralazine, Synthroid, Carvedilol. may take Xanax if needed.  4. Use E-Z sponge as directed  5. Use Mupirocin as directed.  6. Drink a quart of extra  fluids the day before your surgery.  7. Medical optimization for surgery with Dr. Patel and cardiac evaluation with Dr. Mccartney  8. CBC, BMP, Albumin, HGB AIC,  PT/ INR, MRSA done in [facility] and sent to lab  9. EKG on chart from Dr. Mccartney and Chest x- ray done in [facility]  10. Do not take NSAIDS, Aspirin, Herbal supplements, Multivitamins, Vitamin E or Fish oil 7 days prior to surgery    CAPRINI SCORE [CLOT]    AGE RELATED RISK FACTORS                                                       MOBILITY RELATED FACTORS  [ ] Age 41-60 years                                            (1 Point)                  [ ] Bed rest                                                        (1 Point)  [ ] Age: 61-74 years                                           (2 Points)                 [ ] Plaster cast                                                   (2 Points)  [x ] Age> 75 years                                              (3 Points)                 [ ] Bed bound for more than 72 hours                 (2 Points)    DISEASE RELATED RISK FACTORS                                               GENDER SPECIFIC FACTORS  [x ] Edema in the lower extremities                       (1 Point)                  [ ] Pregnancy                                                     (1 Point)  [ ] Varicose veins                                               (1 Point)                  [ ] Post-partum < 6 weeks                                   (1 Point)             [x ] BMI > 25 Kg/m2                                            (1 Point)                  [ ] Hormonal therapy  or oral contraception          (1 Point)                 [ ] Sepsis (in the previous month)                        (1 Point)                  [ ] History of pregnancy complications                 (1 point)  [ ] Pneumonia or serious lung disease                                               [ ] Unexplained or recurrent                     (1 Point)           (in the previous month)                               (1 Point)  [ ] Abnormal pulmonary function test                     (1 Point)                 SURGERY RELATED RISK FACTORS  [ ] Acute myocardial infarction                              (1 Point)                 [ ]  Section                                             (1 Point)  [ ] Congestive heart failure (in the previous month)  (1 Point)               [ ] Minor surgery                                                  (1 Point)   [ ] Inflammatory bowel disease                             (1 Point)                 [ ] Arthroscopic surgery                                        (2 Points)  [ ] Central venous access                                      (2 Points)                [ ] General surgery lasting more than 45 minutes   (2 Points)       [ ] Stroke (in the previous month)                          (5 Points)               [ x] Elective arthroplasty                                         (5 Points)            [ ] malignancy present or previous                      (2 points)                                                                                                                                   HEMATOLOGY RELATED FACTORS                                                 TRAUMA RELATED RISK FACTORS  [ ] Prior episodes of VTE                                     (3 Points)                 [ ] Fracture of the hip, pelvis, or leg                       (5 Points)  [ ] Positive family history for VTE                         (3 Points)                 [ ] Acute spinal cord injury (in the previous month)  (5 Points)  [ ] Prothrombin 13235 A                                     (3 Points)                 [ ] Paralysis  (less than 1 month)                             (5 Points)  [ ] Factor V Leiden                                             (3 Points)                  [ ] Multiple Trauma within 1 month                        (5 Points)  [ ] Lupus anticoagulants                                     (3 Points)                                                           [ ] Anticardiolipin antibodies                               (3 Points)                                                       [ ] High homocysteine in the blood                      (3 Points)                                             [ ] Other congenital or acquired thrombophilia      (3 Points)                                                [ ] Heparin induced thrombocytopenia                  (3 Points)                                          Total Score [    10      ]

## 2024-04-17 NOTE — REVIEW OF SYSTEMS
[Lower Ext Edema] : lower extremity edema [Negative] : Heme/Lymph [SOB] : no shortness of breath [Dyspnea on exertion] : not dyspnea during exertion [Chest Discomfort] : no chest discomfort [Leg Claudication] : no intermittent leg claudication [Palpitations] : no palpitations [Orthopnea] : no orthopnea [PND] : no PND [Syncope] : no syncope

## 2024-04-17 NOTE — H&P PST ADULT - NSALCOHOLTYPE_GEN__A_CORE_SD
PROCEDURE: CT neck soft tissue with contrast.



TECHNIQUE: Multiple contiguous axial images were obtained through

the neck after the administration of contrast. Auto Exposure

Controls were utilized during the CT exam to meet ALARA standards

for radiation dose reduction. 



INDICATION:  Cancer of the lip, oral cavity, and pharynx.

Cervical lymphadenopathy. On chemotherapy and radiation.



COMPARISON: CT neck on 10/30/2019.



Findings:

Postoperative changes are noted in the left neck. There is been

interval increase in size in a soft tissue peripherally enhancing

mass involving the left base of tongue, left tonsillar pillar,

and left floor of mouth. This measures approximately 4.6 cm AP,

3.6 cm transverse, and 3.8 cm craniocaudal. A small focus of

peripherally enhancing mass is seen in the lip just left of

midline measuring 1.2 x 0.7 cm. There has been interval fracture

involving the left body of the mandible without significant

displacement. Multiple pathologically enlarged lymph nodes are

seen in the bilateral level Ia and Ib stations, with the largest

in the right level 1B station measuring 1.3 x 1.2 cm. No CT

findings to suggest perineural tumor spread at this time. 



There is soft tissue edema involving the true vocal cords and

aryepiglottic folds with retained secretions in the aerodigestive

tract. This is causing relative narrowing of the airway.



The bilateral parotid, right submandibular and bilateral thyroid

glands are unremarkable. The left submandibular gland is not

well-visualized.



The vascular structures the neck demonstrate no evidence of

high-grade stenosis on this nondedicated exam. The visualized

lung apices are clear.



The visualized intracranial contents demonstrate no evidence of

pathologic intracranial enhancement or intracranial mass effect.

Visualized orbital contents are unremarkable. The visualized

paranasal sinuses are clear. The mastoids and middle ears are

clear.



No acute osseous abnormality in the cervical spine.



Impression:

1. Interval increase in size in peripheral enhancing soft tissue

mass traversing the left tonsillar pillar, left base of tongue,

and left floor of mouth. There is also a small lesion in the left

just left of midline. Additional increased lymphadenopathy is

seen in the bilateral level 1A and B stations in the neck. These

findings are highly concerning for disease progression.

Alternatively, posttreatment changes secondary to radiation can

have this appearance. No perineural tumor spread is seen at this

time. Recommend continued close interval follow-up.

2. Soft tissue edema involving the true vocal cords and

aryepiglottic folds causing narrowing of the airway. Recommend

correlation with patient symptoms. Tracheostomy may be required

if edema progresses.

3. Nondisplaced fracture involving the left body of the mandible.

This may represent pathologic fracture secondary to tumor

infiltration versus osteonecrosis secondary to posttreatment

changes. Recommend attention on follow-up.







Dictated by: 



  Dictated on workstation # [id number]

## 2024-04-17 NOTE — DISCUSSION/SUMMARY
[EKG obtained to assist in diagnosis and management of assessed problem(s)] : EKG obtained to assist in diagnosis and management of assessed problem(s) [FreeTextEntry1] : Pt is an 84 y/o F PMH non-obstructive CAD with normal LV function, HTN, HLD, ESPERANZA, NIDDM   She is scheduled for L hip surgery 05/02/2024 with Dr Vides at  check nuc stress test  repeat TTE Further preop recommendations will be made once diagnostic testing is complete.   CAD: small, mild defects on nuc c/w ASA and statin  LDL at goal <70 A1c = 6.3  HTN:  well controlled c/w coreg 25mg bid, lisinopril 40mg, hydralazine 30mg bid Advised low salt diet, regular exercise/activity   DM: follows with PCP c/w current meds goal A1c <7 Advised lifestyle modifications   The described plan was discussed with the pt and daughter.  All questions and concerns were addressed to the best of my knowledge.

## 2024-04-18 ENCOUNTER — APPOINTMENT (OUTPATIENT)
Dept: CARDIOLOGY | Facility: CLINIC | Age: 84
End: 2024-04-18
Payer: MEDICARE

## 2024-04-18 ENCOUNTER — APPOINTMENT (OUTPATIENT)
Dept: FAMILY MEDICINE | Facility: CLINIC | Age: 84
End: 2024-04-18
Payer: MEDICARE

## 2024-04-18 VITALS
WEIGHT: 157 LBS | SYSTOLIC BLOOD PRESSURE: 108 MMHG | TEMPERATURE: 97.4 F | OXYGEN SATURATION: 96 % | HEIGHT: 63 IN | HEART RATE: 60 BPM | DIASTOLIC BLOOD PRESSURE: 70 MMHG | BODY MASS INDEX: 27.82 KG/M2

## 2024-04-18 DIAGNOSIS — Z01.818 ENCOUNTER FOR OTHER PREPROCEDURAL EXAMINATION: ICD-10-CM

## 2024-04-18 DIAGNOSIS — M16.12 UNILATERAL PRIMARY OSTEOARTHRITIS, LEFT HIP: ICD-10-CM

## 2024-04-18 DIAGNOSIS — I10 ESSENTIAL (PRIMARY) HYPERTENSION: ICD-10-CM

## 2024-04-18 LAB
A1C WITH ESTIMATED AVERAGE GLUCOSE RESULT: 6 % — HIGH (ref 4–5.6)
ESTIMATED AVERAGE GLUCOSE: 126 MG/DL — HIGH (ref 68–114)
MRSA PCR RESULT.: SIGNIFICANT CHANGE UP
S AUREUS DNA NOSE QL NAA+PROBE: SIGNIFICANT CHANGE UP

## 2024-04-18 PROCEDURE — 93018 CV STRESS TEST I&R ONLY: CPT

## 2024-04-18 PROCEDURE — A9500: CPT

## 2024-04-18 PROCEDURE — 99214 OFFICE O/P EST MOD 30 MIN: CPT

## 2024-04-18 PROCEDURE — 93017 CV STRESS TEST TRACING ONLY: CPT

## 2024-04-18 PROCEDURE — 93016 CV STRESS TEST SUPVJ ONLY: CPT

## 2024-04-18 PROCEDURE — 78452 HT MUSCLE IMAGE SPECT MULT: CPT

## 2024-04-18 RX ORDER — AMOXICILLIN AND CLAVULANATE POTASSIUM 500; 125 MG/1; MG/1
500-125 TABLET, FILM COATED ORAL
Qty: 15 | Refills: 1 | Status: DISCONTINUED | COMMUNITY
Start: 2024-03-28 | End: 2024-04-18

## 2024-04-18 RX ORDER — MELOXICAM 15 MG/1
15 TABLET ORAL
Qty: 30 | Refills: 3 | Status: DISCONTINUED | COMMUNITY
Start: 2021-09-23 | End: 2024-04-18

## 2024-04-18 RX ORDER — OMEPRAZOLE 40 MG/1
40 CAPSULE, DELAYED RELEASE ORAL
Qty: 30 | Refills: 3 | Status: ACTIVE | COMMUNITY
Start: 2021-01-08

## 2024-04-18 NOTE — PHYSICAL EXAM
[Normal] : normal rate, regular rhythm, normal S1 and S2 and no murmur heard [de-identified] : Chronic brawny edema left worse than right

## 2024-04-18 NOTE — ASSESSMENT
[Patient Optimized for Surgery] : Patient optimized for surgery [Cardiology consultation] : Cardiology consultation [Modify medications prior to procedure] : Modify medications prior to procedure [FreeTextEntry4] : Patient is optimized for surgery pending cardiology clearance. [FreeTextEntry7] : Hold Janumet prior to surgery

## 2024-04-18 NOTE — HISTORY OF PRESENT ILLNESS
[No Pertinent Pulmonary History] : no history of asthma, COPD, sleep apnea, or smoking [No Adverse Anesthesia Reaction] : no adverse anesthesia reaction in self or family member [Chronic Anticoagulation] : no chronic anticoagulation [Chronic Kidney Disease] : no chronic kidney disease [Diabetes] : diabetes [(Patient denies any chest pain, claudication, dyspnea on exertion, orthopnea, palpitations or syncope)] : Patient denies any chest pain, claudication, dyspnea on exertion, orthopnea, palpitations or syncope [FreeTextEntry1] : Total left hip replacement [FreeTextEntry2] : May 2 [FreeTextEntry3] : Dr. Vides [FreeTextEntry4] : Patient has bone-on-bone left hip too painful to walk to have total hip replacement  She has had bilateral knee replacements 2013 and the second 2015 both the surgeries were uncomplicated  Patient has been cleared by cardiology recent nuclear stress test unremarkable awaiting echocardiogram  Diabetes well-controlled A1c 6.0 she will hold diabetic medication on the day of surgery  Hypertension and hypothyroidism well-controlled on current meds

## 2024-04-22 ENCOUNTER — APPOINTMENT (OUTPATIENT)
Dept: CARDIOLOGY | Facility: CLINIC | Age: 84
End: 2024-04-22
Payer: MEDICARE

## 2024-04-22 PROCEDURE — 93306 TTE W/DOPPLER COMPLETE: CPT

## 2024-04-23 RX ORDER — MONTELUKAST 4 MG/1
1 TABLET, CHEWABLE ORAL
Refills: 0 | DISCHARGE

## 2024-04-23 RX ORDER — PREGABALIN 225 MG/1
1 CAPSULE ORAL
Refills: 0 | DISCHARGE

## 2024-04-23 RX ORDER — ASCORBIC ACID 60 MG
1 TABLET,CHEWABLE ORAL
Refills: 0 | DISCHARGE

## 2024-04-23 RX ORDER — ATORVASTATIN CALCIUM 80 MG/1
1 TABLET, FILM COATED ORAL
Refills: 0 | DISCHARGE

## 2024-04-23 RX ORDER — DICLOFENAC SODIUM 30 MG/G
2 GEL TOPICAL
Refills: 0 | DISCHARGE

## 2024-04-23 RX ORDER — CALCIUM CARBONATE 500(1250)
1 TABLET ORAL
Refills: 0 | DISCHARGE

## 2024-04-23 RX ORDER — LEVOTHYROXINE SODIUM 125 MCG
1 TABLET ORAL
Refills: 0 | DISCHARGE

## 2024-04-23 RX ORDER — BUDESONIDE AND FORMOTEROL FUMARATE DIHYDRATE 160; 4.5 UG/1; UG/1
2 AEROSOL RESPIRATORY (INHALATION)
Refills: 0 | DISCHARGE

## 2024-04-23 RX ORDER — QUINAPRIL HYDROCHLORIDE 40 MG/1
1 TABLET, FILM COATED ORAL
Refills: 0 | DISCHARGE

## 2024-04-23 RX ORDER — DIPHENOXYLATE HCL/ATROPINE 2.5-.025MG
2 TABLET ORAL
Refills: 0 | DISCHARGE

## 2024-04-23 RX ORDER — ALPRAZOLAM 0.25 MG
1 TABLET ORAL
Refills: 0 | DISCHARGE

## 2024-04-23 RX ORDER — OMEPRAZOLE 10 MG/1
1 CAPSULE, DELAYED RELEASE ORAL
Refills: 0 | DISCHARGE

## 2024-04-23 RX ORDER — UBIDECARENONE 100 MG
1 CAPSULE ORAL
Refills: 0 | DISCHARGE

## 2024-04-23 RX ORDER — ALUMINUM ZIRCONIUM TRICHLOROHYDREX GLY 0.2 G/G
1 STICK TOPICAL
Refills: 0 | DISCHARGE

## 2024-04-23 RX ORDER — ASPIRIN/CALCIUM CARB/MAGNESIUM 324 MG
1 TABLET ORAL
Refills: 0 | DISCHARGE

## 2024-04-23 RX ORDER — HYDRALAZINE HCL 50 MG
3 TABLET ORAL
Refills: 0 | DISCHARGE

## 2024-04-23 RX ORDER — CHOLECALCIFEROL (VITAMIN D3) 125 MCG
1 CAPSULE ORAL
Refills: 0 | DISCHARGE

## 2024-04-23 RX ORDER — CARVEDILOL PHOSPHATE 80 MG/1
1 CAPSULE, EXTENDED RELEASE ORAL
Refills: 0 | DISCHARGE

## 2024-04-23 RX ORDER — KETOCONAZOLE 20 MG/G
1 AEROSOL, FOAM TOPICAL
Refills: 0 | DISCHARGE

## 2024-04-23 RX ORDER — LANOLIN ALCOHOL/MO/W.PET/CERES
1 CREAM (GRAM) TOPICAL
Refills: 0 | DISCHARGE

## 2024-04-23 RX ORDER — SITAGLIPTIN AND METFORMIN HYDROCHLORIDE 500; 50 MG/1; MG/1
1 TABLET, FILM COATED ORAL
Refills: 0 | DISCHARGE

## 2024-04-23 RX ORDER — ZINC ACETATE DIHYDRATE 100 %
1 CRYSTALS MISCELLANEOUS
Refills: 0 | DISCHARGE

## 2024-04-29 ENCOUNTER — RX RENEWAL (OUTPATIENT)
Age: 84
End: 2024-04-29

## 2024-04-29 RX ORDER — HYDROMORPHONE HYDROCHLORIDE 2 MG/ML
0.5 INJECTION INTRAMUSCULAR; INTRAVENOUS; SUBCUTANEOUS
Refills: 0 | Status: DISCONTINUED | OUTPATIENT
Start: 2024-05-02 | End: 2024-05-04

## 2024-04-29 RX ORDER — SENNA PLUS 8.6 MG/1
2 TABLET ORAL AT BEDTIME
Refills: 0 | Status: DISCONTINUED | OUTPATIENT
Start: 2024-05-02 | End: 2024-05-04

## 2024-04-29 RX ORDER — TRANEXAMIC ACID 100 MG/ML
1000 INJECTION, SOLUTION INTRAVENOUS ONCE
Refills: 0 | Status: DISCONTINUED | OUTPATIENT
Start: 2024-05-02 | End: 2024-05-04

## 2024-04-29 RX ORDER — OXYCODONE HYDROCHLORIDE 5 MG/1
10 TABLET ORAL
Refills: 0 | Status: DISCONTINUED | OUTPATIENT
Start: 2024-05-02 | End: 2024-05-04

## 2024-04-29 RX ORDER — RIVAROXABAN 15 MG-20MG
1 KIT ORAL
Qty: 28 | Refills: 0 | DISCHARGE
Start: 2024-04-29 | End: 2024-05-26

## 2024-04-29 RX ORDER — POLYETHYLENE GLYCOL 3350 17 G/17G
17 POWDER, FOR SOLUTION ORAL AT BEDTIME
Refills: 0 | Status: DISCONTINUED | OUTPATIENT
Start: 2024-05-02 | End: 2024-05-04

## 2024-04-29 RX ORDER — SODIUM CHLORIDE 9 MG/ML
1000 INJECTION, SOLUTION INTRAVENOUS
Refills: 0 | Status: DISCONTINUED | OUTPATIENT
Start: 2024-05-02 | End: 2024-05-04

## 2024-04-29 RX ORDER — ACETAMINOPHEN 500 MG
1000 TABLET ORAL EVERY 8 HOURS
Refills: 0 | Status: DISCONTINUED | OUTPATIENT
Start: 2024-05-02 | End: 2024-05-04

## 2024-04-29 RX ORDER — RIVAROXABAN 15 MG-20MG
1 KIT ORAL
Qty: 28 | Refills: 0
Start: 2024-04-29 | End: 2024-05-26

## 2024-04-29 RX ORDER — PANTOPRAZOLE SODIUM 20 MG/1
40 TABLET, DELAYED RELEASE ORAL
Refills: 0 | Status: DISCONTINUED | OUTPATIENT
Start: 2024-05-02 | End: 2024-05-04

## 2024-04-29 RX ORDER — ONDANSETRON 8 MG/1
4 TABLET, FILM COATED ORAL EVERY 6 HOURS
Refills: 0 | Status: DISCONTINUED | OUTPATIENT
Start: 2024-05-02 | End: 2024-05-04

## 2024-04-29 RX ORDER — OXYCODONE HYDROCHLORIDE 5 MG/1
5 TABLET ORAL
Refills: 0 | Status: DISCONTINUED | OUTPATIENT
Start: 2024-05-02 | End: 2024-05-04

## 2024-04-29 RX ORDER — CELECOXIB 200 MG/1
200 CAPSULE ORAL EVERY 12 HOURS
Refills: 0 | Status: DISCONTINUED | OUTPATIENT
Start: 2024-05-02 | End: 2024-05-04

## 2024-04-29 RX ORDER — RIVAROXABAN 15 MG-20MG
10 KIT ORAL DAILY
Refills: 0 | Status: DISCONTINUED | OUTPATIENT
Start: 2024-05-02 | End: 2024-05-04

## 2024-05-02 ENCOUNTER — INPATIENT (INPATIENT)
Facility: HOSPITAL | Age: 84
LOS: 1 days | Discharge: NURSING, REHAB OR LONG-TERM CARE | DRG: 470 | End: 2024-05-04
Attending: ORTHOPAEDIC SURGERY | Admitting: ORTHOPAEDIC SURGERY
Payer: MEDICARE

## 2024-05-02 ENCOUNTER — APPOINTMENT (OUTPATIENT)
Dept: ORTHOPEDIC SURGERY | Facility: HOSPITAL | Age: 84
End: 2024-05-02
Payer: MEDICARE

## 2024-05-02 ENCOUNTER — TRANSCRIPTION ENCOUNTER (OUTPATIENT)
Age: 84
End: 2024-05-02

## 2024-05-02 ENCOUNTER — RESULT REVIEW (OUTPATIENT)
Age: 84
End: 2024-05-02

## 2024-05-02 VITALS
WEIGHT: 160.94 LBS | RESPIRATION RATE: 18 BRPM | TEMPERATURE: 98 F | OXYGEN SATURATION: 100 % | HEIGHT: 63 IN | DIASTOLIC BLOOD PRESSURE: 48 MMHG | HEART RATE: 57 BPM | SYSTOLIC BLOOD PRESSURE: 102 MMHG

## 2024-05-02 DIAGNOSIS — Z98.890 OTHER SPECIFIED POSTPROCEDURAL STATES: Chronic | ICD-10-CM

## 2024-05-02 DIAGNOSIS — Z96.651 PRESENCE OF RIGHT ARTIFICIAL KNEE JOINT: Chronic | ICD-10-CM

## 2024-05-02 DIAGNOSIS — I87.2 VENOUS INSUFFICIENCY (CHRONIC) (PERIPHERAL): Chronic | ICD-10-CM

## 2024-05-02 DIAGNOSIS — Z85.828 PERSONAL HISTORY OF OTHER MALIGNANT NEOPLASM OF SKIN: Chronic | ICD-10-CM

## 2024-05-02 DIAGNOSIS — Z96.641 PRESENCE OF RIGHT ARTIFICIAL HIP JOINT: Chronic | ICD-10-CM

## 2024-05-02 DIAGNOSIS — Z98.49 CATARACT EXTRACTION STATUS, UNSPECIFIED EYE: Chronic | ICD-10-CM

## 2024-05-02 DIAGNOSIS — Z90.710 ACQUIRED ABSENCE OF BOTH CERVIX AND UTERUS: Chronic | ICD-10-CM

## 2024-05-02 DIAGNOSIS — Z90.49 ACQUIRED ABSENCE OF OTHER SPECIFIED PARTS OF DIGESTIVE TRACT: Chronic | ICD-10-CM

## 2024-05-02 DIAGNOSIS — M16.12 UNILATERAL PRIMARY OSTEOARTHRITIS, LEFT HIP: ICD-10-CM

## 2024-05-02 DIAGNOSIS — Z90.89 ACQUIRED ABSENCE OF OTHER ORGANS: Chronic | ICD-10-CM

## 2024-05-02 LAB
ANION GAP SERPL CALC-SCNC: 8 MMOL/L — SIGNIFICANT CHANGE UP (ref 5–17)
BUN SERPL-MCNC: 36 MG/DL — HIGH (ref 7–23)
CALCIUM SERPL-MCNC: 8.8 MG/DL — SIGNIFICANT CHANGE UP (ref 8.5–10.1)
CHLORIDE SERPL-SCNC: 110 MMOL/L — HIGH (ref 96–108)
CO2 SERPL-SCNC: 23 MMOL/L — SIGNIFICANT CHANGE UP (ref 22–31)
CREAT SERPL-MCNC: 0.8 MG/DL — SIGNIFICANT CHANGE UP (ref 0.5–1.3)
EGFR: 72 ML/MIN/1.73M2 — SIGNIFICANT CHANGE UP
GLUCOSE BLDC GLUCOMTR-MCNC: 115 MG/DL — HIGH (ref 70–99)
GLUCOSE BLDC GLUCOMTR-MCNC: 121 MG/DL — HIGH (ref 70–99)
GLUCOSE BLDC GLUCOMTR-MCNC: 124 MG/DL — HIGH (ref 70–99)
GLUCOSE BLDC GLUCOMTR-MCNC: 141 MG/DL — HIGH (ref 70–99)
GLUCOSE SERPL-MCNC: 117 MG/DL — HIGH (ref 70–99)
HCT VFR BLD CALC: 28.2 % — LOW (ref 34.5–45)
HGB BLD-MCNC: 9 G/DL — LOW (ref 11.5–15.5)
MCHC RBC-ENTMCNC: 30.6 PG — SIGNIFICANT CHANGE UP (ref 27–34)
MCHC RBC-ENTMCNC: 31.9 GM/DL — LOW (ref 32–36)
MCV RBC AUTO: 95.9 FL — SIGNIFICANT CHANGE UP (ref 80–100)
PLATELET # BLD AUTO: 257 K/UL — SIGNIFICANT CHANGE UP (ref 150–400)
POTASSIUM SERPL-MCNC: 4.1 MMOL/L — SIGNIFICANT CHANGE UP (ref 3.5–5.3)
POTASSIUM SERPL-SCNC: 4.1 MMOL/L — SIGNIFICANT CHANGE UP (ref 3.5–5.3)
RBC # BLD: 2.94 M/UL — LOW (ref 3.8–5.2)
RBC # FLD: 14.3 % — SIGNIFICANT CHANGE UP (ref 10.3–14.5)
SODIUM SERPL-SCNC: 141 MMOL/L — SIGNIFICANT CHANGE UP (ref 135–145)
WBC # BLD: 8.94 K/UL — SIGNIFICANT CHANGE UP (ref 3.8–10.5)
WBC # FLD AUTO: 8.94 K/UL — SIGNIFICANT CHANGE UP (ref 3.8–10.5)

## 2024-05-02 PROCEDURE — 80048 BASIC METABOLIC PNL TOTAL CA: CPT

## 2024-05-02 PROCEDURE — 97530 THERAPEUTIC ACTIVITIES: CPT | Mod: GP

## 2024-05-02 PROCEDURE — 27130 TOTAL HIP ARTHROPLASTY: CPT | Mod: LT

## 2024-05-02 PROCEDURE — 36415 COLL VENOUS BLD VENIPUNCTURE: CPT

## 2024-05-02 PROCEDURE — C1713: CPT

## 2024-05-02 PROCEDURE — 76000 FLUOROSCOPY <1 HR PHYS/QHP: CPT | Mod: 26

## 2024-05-02 PROCEDURE — 85027 COMPLETE CBC AUTOMATED: CPT

## 2024-05-02 PROCEDURE — 73501 X-RAY EXAM HIP UNI 1 VIEW: CPT | Mod: LT

## 2024-05-02 PROCEDURE — 82962 GLUCOSE BLOOD TEST: CPT

## 2024-05-02 PROCEDURE — C1776: CPT

## 2024-05-02 PROCEDURE — 97162 PT EVAL MOD COMPLEX 30 MIN: CPT | Mod: GP

## 2024-05-02 PROCEDURE — 88300 SURGICAL PATH GROSS: CPT | Mod: 26

## 2024-05-02 PROCEDURE — 94640 AIRWAY INHALATION TREATMENT: CPT

## 2024-05-02 PROCEDURE — 97535 SELF CARE MNGMENT TRAINING: CPT | Mod: GO

## 2024-05-02 PROCEDURE — 73501 X-RAY EXAM HIP UNI 1 VIEW: CPT | Mod: 26,LT

## 2024-05-02 PROCEDURE — 88300 SURGICAL PATH GROSS: CPT

## 2024-05-02 PROCEDURE — 99223 1ST HOSP IP/OBS HIGH 75: CPT

## 2024-05-02 PROCEDURE — 97116 GAIT TRAINING THERAPY: CPT | Mod: GP

## 2024-05-02 PROCEDURE — 97166 OT EVAL MOD COMPLEX 45 MIN: CPT | Mod: GO

## 2024-05-02 RX ORDER — GLUCAGON INJECTION, SOLUTION 0.5 MG/.1ML
1 INJECTION, SOLUTION SUBCUTANEOUS ONCE
Refills: 0 | Status: DISCONTINUED | OUTPATIENT
Start: 2024-05-02 | End: 2024-05-04

## 2024-05-02 RX ORDER — INSULIN LISPRO 100/ML
VIAL (ML) SUBCUTANEOUS AT BEDTIME
Refills: 0 | Status: DISCONTINUED | OUTPATIENT
Start: 2024-05-02 | End: 2024-05-02

## 2024-05-02 RX ORDER — HYDRALAZINE HCL 50 MG
3 TABLET ORAL
Refills: 0 | DISCHARGE

## 2024-05-02 RX ORDER — BUDESONIDE AND FORMOTEROL FUMARATE DIHYDRATE 160; 4.5 UG/1; UG/1
2 AEROSOL RESPIRATORY (INHALATION)
Refills: 0 | DISCHARGE

## 2024-05-02 RX ORDER — LEVOTHYROXINE SODIUM 125 MCG
100 TABLET ORAL
Refills: 0 | Status: DISCONTINUED | OUTPATIENT
Start: 2024-05-02 | End: 2024-05-04

## 2024-05-02 RX ORDER — POLYETHYLENE GLYCOL 3350 17 G/17G
17 POWDER, FOR SOLUTION ORAL
Qty: 1 | Refills: 0
Start: 2024-05-02 | End: 2024-05-15

## 2024-05-02 RX ORDER — SITAGLIPTIN AND METFORMIN HYDROCHLORIDE 500; 50 MG/1; MG/1
1 TABLET, FILM COATED ORAL
Refills: 0 | DISCHARGE

## 2024-05-02 RX ORDER — CARVEDILOL PHOSPHATE 80 MG/1
1 CAPSULE, EXTENDED RELEASE ORAL
Refills: 0 | DISCHARGE

## 2024-05-02 RX ORDER — DEXTROSE 10 % IN WATER 10 %
125 INTRAVENOUS SOLUTION INTRAVENOUS ONCE
Refills: 0 | Status: DISCONTINUED | OUTPATIENT
Start: 2024-05-02 | End: 2024-05-04

## 2024-05-02 RX ORDER — LISINOPRIL 2.5 MG/1
40 TABLET ORAL DAILY
Refills: 0 | Status: DISCONTINUED | OUTPATIENT
Start: 2024-05-02 | End: 2024-05-04

## 2024-05-02 RX ORDER — CARVEDILOL PHOSPHATE 80 MG/1
25 CAPSULE, EXTENDED RELEASE ORAL EVERY 12 HOURS
Refills: 0 | Status: DISCONTINUED | OUTPATIENT
Start: 2024-05-02 | End: 2024-05-04

## 2024-05-02 RX ORDER — OMEPRAZOLE 10 MG/1
1 CAPSULE, DELAYED RELEASE ORAL
Qty: 0 | Refills: 0 | DISCHARGE

## 2024-05-02 RX ORDER — DEXTROSE 50 % IN WATER 50 %
15 SYRINGE (ML) INTRAVENOUS ONCE
Refills: 0 | Status: DISCONTINUED | OUTPATIENT
Start: 2024-05-02 | End: 2024-05-04

## 2024-05-02 RX ORDER — FENTANYL CITRATE 50 UG/ML
25 INJECTION INTRAVENOUS
Refills: 0 | Status: DISCONTINUED | OUTPATIENT
Start: 2024-05-02 | End: 2024-05-02

## 2024-05-02 RX ORDER — CALCIUM CARBONATE 500(1250)
1 TABLET ORAL
Refills: 0 | DISCHARGE

## 2024-05-02 RX ORDER — ASPIRIN/CALCIUM CARB/MAGNESIUM 324 MG
81 TABLET ORAL DAILY
Refills: 0 | Status: DISCONTINUED | OUTPATIENT
Start: 2024-05-03 | End: 2024-05-04

## 2024-05-02 RX ORDER — KETOCONAZOLE 20 MG/G
1 AEROSOL, FOAM TOPICAL
Refills: 0 | DISCHARGE

## 2024-05-02 RX ORDER — ASPIRIN/CALCIUM CARB/MAGNESIUM 324 MG
1 TABLET ORAL
Refills: 0 | DISCHARGE

## 2024-05-02 RX ORDER — INSULIN LISPRO 100/ML
VIAL (ML) SUBCUTANEOUS AT BEDTIME
Refills: 0 | Status: DISCONTINUED | OUTPATIENT
Start: 2024-05-02 | End: 2024-05-04

## 2024-05-02 RX ORDER — OXYCODONE HYDROCHLORIDE 5 MG/1
5 TABLET ORAL ONCE
Refills: 0 | Status: DISCONTINUED | OUTPATIENT
Start: 2024-05-02 | End: 2024-05-02

## 2024-05-02 RX ORDER — LANOLIN ALCOHOL/MO/W.PET/CERES
1 CREAM (GRAM) TOPICAL
Refills: 0 | DISCHARGE

## 2024-05-02 RX ORDER — DEXTROSE 50 % IN WATER 50 %
12.5 SYRINGE (ML) INTRAVENOUS ONCE
Refills: 0 | Status: DISCONTINUED | OUTPATIENT
Start: 2024-05-02 | End: 2024-05-04

## 2024-05-02 RX ORDER — PANTOPRAZOLE SODIUM 20 MG/1
1 TABLET, DELAYED RELEASE ORAL
Qty: 30 | Refills: 0
Start: 2024-05-02 | End: 2024-05-31

## 2024-05-02 RX ORDER — DEXTROSE 50 % IN WATER 50 %
25 SYRINGE (ML) INTRAVENOUS ONCE
Refills: 0 | Status: DISCONTINUED | OUTPATIENT
Start: 2024-05-02 | End: 2024-05-04

## 2024-05-02 RX ORDER — SODIUM CHLORIDE 9 MG/ML
1000 INJECTION, SOLUTION INTRAVENOUS
Refills: 0 | Status: DISCONTINUED | OUTPATIENT
Start: 2024-05-02 | End: 2024-05-04

## 2024-05-02 RX ORDER — CEFAZOLIN SODIUM 1 G
2000 VIAL (EA) INJECTION EVERY 8 HOURS
Refills: 0 | Status: COMPLETED | OUTPATIENT
Start: 2024-05-02 | End: 2024-05-03

## 2024-05-02 RX ORDER — INSULIN LISPRO 100/ML
VIAL (ML) SUBCUTANEOUS
Refills: 0 | Status: DISCONTINUED | OUTPATIENT
Start: 2024-05-02 | End: 2024-05-02

## 2024-05-02 RX ORDER — ALPRAZOLAM 0.25 MG
1 TABLET ORAL
Refills: 0 | DISCHARGE

## 2024-05-02 RX ORDER — NALOXONE HYDROCHLORIDE 4 MG/.1ML
4 SPRAY NASAL
Qty: 1 | Refills: 0
Start: 2024-05-02 | End: 2024-05-06

## 2024-05-02 RX ORDER — LEVOTHYROXINE SODIUM 125 MCG
1 TABLET ORAL
Refills: 0 | DISCHARGE

## 2024-05-02 RX ORDER — PANTOPRAZOLE SODIUM 20 MG/1
40 TABLET, DELAYED RELEASE ORAL ONCE
Refills: 0 | Status: COMPLETED | OUTPATIENT
Start: 2024-05-02 | End: 2024-05-02

## 2024-05-02 RX ORDER — ACETAMINOPHEN 500 MG
2 TABLET ORAL
Qty: 84 | Refills: 0
Start: 2024-05-02 | End: 2024-05-15

## 2024-05-02 RX ORDER — OXYCODONE HYDROCHLORIDE 5 MG/1
1 TABLET ORAL
Qty: 30 | Refills: 0
Start: 2024-05-02 | End: 2024-05-06

## 2024-05-02 RX ORDER — ATORVASTATIN CALCIUM 80 MG/1
1 TABLET, FILM COATED ORAL
Refills: 0 | DISCHARGE

## 2024-05-02 RX ORDER — ALUMINUM ZIRCONIUM TRICHLOROHYDREX GLY 0.2 G/G
1 STICK TOPICAL
Refills: 0 | DISCHARGE

## 2024-05-02 RX ORDER — BUDESONIDE AND FORMOTEROL FUMARATE DIHYDRATE 160; 4.5 UG/1; UG/1
2 AEROSOL RESPIRATORY (INHALATION)
Refills: 0 | Status: DISCONTINUED | OUTPATIENT
Start: 2024-05-02 | End: 2024-05-04

## 2024-05-02 RX ORDER — SODIUM CHLORIDE 9 MG/ML
1000 INJECTION, SOLUTION INTRAVENOUS
Refills: 0 | Status: DISCONTINUED | OUTPATIENT
Start: 2024-05-02 | End: 2024-05-02

## 2024-05-02 RX ORDER — CHOLECALCIFEROL (VITAMIN D3) 125 MCG
1 CAPSULE ORAL
Refills: 0 | DISCHARGE

## 2024-05-02 RX ORDER — UBIDECARENONE 100 MG
1 CAPSULE ORAL
Refills: 0 | DISCHARGE

## 2024-05-02 RX ORDER — LEVOTHYROXINE SODIUM 125 MCG
100 TABLET ORAL DAILY
Refills: 0 | Status: DISCONTINUED | OUTPATIENT
Start: 2024-05-02 | End: 2024-05-02

## 2024-05-02 RX ORDER — PREGABALIN 225 MG/1
1 CAPSULE ORAL
Refills: 0 | DISCHARGE

## 2024-05-02 RX ORDER — SENNA PLUS 8.6 MG/1
2 TABLET ORAL
Qty: 28 | Refills: 0
Start: 2024-05-02 | End: 2024-05-15

## 2024-05-02 RX ORDER — INSULIN LISPRO 100/ML
VIAL (ML) SUBCUTANEOUS
Refills: 0 | Status: DISCONTINUED | OUTPATIENT
Start: 2024-05-02 | End: 2024-05-04

## 2024-05-02 RX ORDER — ONDANSETRON 8 MG/1
4 TABLET, FILM COATED ORAL ONCE
Refills: 0 | Status: DISCONTINUED | OUTPATIENT
Start: 2024-05-02 | End: 2024-05-02

## 2024-05-02 RX ORDER — ZINC ACETATE DIHYDRATE 100 %
1 CRYSTALS MISCELLANEOUS
Refills: 0 | DISCHARGE

## 2024-05-02 RX ORDER — DIPHENOXYLATE HCL/ATROPINE 2.5-.025MG
2 TABLET ORAL
Refills: 0 | DISCHARGE

## 2024-05-02 RX ORDER — MONTELUKAST 4 MG/1
10 TABLET, CHEWABLE ORAL AT BEDTIME
Refills: 0 | Status: DISCONTINUED | OUTPATIENT
Start: 2024-05-02 | End: 2024-05-04

## 2024-05-02 RX ORDER — QUINAPRIL HYDROCHLORIDE 40 MG/1
1 TABLET, FILM COATED ORAL
Refills: 0 | DISCHARGE

## 2024-05-02 RX ORDER — ATORVASTATIN CALCIUM 80 MG/1
20 TABLET, FILM COATED ORAL AT BEDTIME
Refills: 0 | Status: DISCONTINUED | OUTPATIENT
Start: 2024-05-02 | End: 2024-05-04

## 2024-05-02 RX ORDER — HYDRALAZINE HCL 50 MG
30 TABLET ORAL
Refills: 0 | Status: DISCONTINUED | OUTPATIENT
Start: 2024-05-02 | End: 2024-05-04

## 2024-05-02 RX ORDER — DICLOFENAC SODIUM 30 MG/G
2 GEL TOPICAL
Refills: 0 | DISCHARGE

## 2024-05-02 RX ORDER — CEFAZOLIN SODIUM 1 G
2000 VIAL (EA) INJECTION EVERY 8 HOURS
Refills: 0 | Status: DISCONTINUED | OUTPATIENT
Start: 2024-05-02 | End: 2024-05-02

## 2024-05-02 RX ORDER — MONTELUKAST 4 MG/1
1 TABLET, CHEWABLE ORAL
Refills: 0 | DISCHARGE

## 2024-05-02 RX ORDER — LANOLIN ALCOHOL/MO/W.PET/CERES
3 CREAM (GRAM) TOPICAL AT BEDTIME
Refills: 0 | Status: DISCONTINUED | OUTPATIENT
Start: 2024-05-02 | End: 2024-05-04

## 2024-05-02 RX ORDER — ASCORBIC ACID 60 MG
1 TABLET,CHEWABLE ORAL
Refills: 0 | DISCHARGE

## 2024-05-02 RX ADMIN — POLYETHYLENE GLYCOL 3350 17 GRAM(S): 17 POWDER, FOR SOLUTION ORAL at 22:01

## 2024-05-02 RX ADMIN — Medication 3 MILLIGRAM(S): at 22:02

## 2024-05-02 RX ADMIN — CARVEDILOL PHOSPHATE 25 MILLIGRAM(S): 80 CAPSULE, EXTENDED RELEASE ORAL at 22:03

## 2024-05-02 RX ADMIN — ATORVASTATIN CALCIUM 20 MILLIGRAM(S): 80 TABLET, FILM COATED ORAL at 22:03

## 2024-05-02 RX ADMIN — Medication 30 MILLIGRAM(S): at 22:03

## 2024-05-02 RX ADMIN — CELECOXIB 200 MILLIGRAM(S): 200 CAPSULE ORAL at 23:12

## 2024-05-02 RX ADMIN — Medication 1 TABLET(S): at 22:03

## 2024-05-02 RX ADMIN — CELECOXIB 200 MILLIGRAM(S): 200 CAPSULE ORAL at 22:02

## 2024-05-02 RX ADMIN — PANTOPRAZOLE SODIUM 40 MILLIGRAM(S): 20 TABLET, DELAYED RELEASE ORAL at 08:09

## 2024-05-02 RX ADMIN — SENNA PLUS 2 TABLET(S): 8.6 TABLET ORAL at 22:02

## 2024-05-02 RX ADMIN — OXYCODONE HYDROCHLORIDE 5 MILLIGRAM(S): 5 TABLET ORAL at 16:29

## 2024-05-02 RX ADMIN — Medication 2000 MILLIGRAM(S): at 18:16

## 2024-05-02 RX ADMIN — MONTELUKAST 10 MILLIGRAM(S): 4 TABLET, CHEWABLE ORAL at 22:01

## 2024-05-02 RX ADMIN — PANTOPRAZOLE SODIUM 40 MILLIGRAM(S): 20 TABLET, DELAYED RELEASE ORAL at 16:29

## 2024-05-02 RX ADMIN — Medication 1000 MILLIGRAM(S): at 22:03

## 2024-05-02 RX ADMIN — RIVAROXABAN 10 MILLIGRAM(S): KIT at 22:08

## 2024-05-02 RX ADMIN — Medication 1000 MILLIGRAM(S): at 23:11

## 2024-05-02 RX ADMIN — OXYCODONE HYDROCHLORIDE 5 MILLIGRAM(S): 5 TABLET ORAL at 17:22

## 2024-05-02 NOTE — DISCHARGE NOTE PROVIDER - NSDCFUADDINST_GEN_ALL_CORE_FT
Discharge Instructions for Left Total Hip Arthroplasty:    1. ACTIVITY: WBAT. Rolling walker. Posterior Hip Dislocation Precautions. Abduction Pillow while in bed for 6 weeks. Daily PT.  2. CALL FOR: fever over 101, wound redness, drainage or open area, calf pain/calf swelling.  3. BANDAGE: Change dressing to a new Mepilex Ag bandage POD7 (5/9/24). May change sooner if dressing saturated or falling off. DO NOT REMOVE BANDAGE TO CHECK WOUND ON INTAKE.  4. STAPLES: RN Remove Staples POD14 (5/16/24).  5. SHOWER: Okay to shower. Do not soak, submerge or let shower stream beat on dressing/wound.  6. DVT PE Prophylaxis: Xarelto 10mg PO daily x 28 days. See Med Rec.  7. GI: Continue Protonix daily while on Anticoagulant. eRx has been sent to your pharmacy.  8. FOLLOW UP: Dr. Vides in 1 month. Call office to schedule.  9. MEDICATION: eRX sent to your pharmacy for  if you go home.   10. **Call office if medications not covered under your insurance, especially BLOOD CLOT PREVENTION/anticoagulant medication.

## 2024-05-02 NOTE — PATIENT PROFILE ADULT - FALL HARM RISK - RISK INTERVENTIONS

## 2024-05-02 NOTE — DISCHARGE NOTE PROVIDER - HOSPITAL COURSE
H&P:  Pt is a 83y Female    Multiple PMHx    Now s/p Left Total Hip Arthroplasty. Pt is afebrile with stable vital signs. Pain is controlled. Alert and Oriented. Exam reveals intact EHL FHL TA GS, +DP. Dressing is clean and dry.    Hospital Course:  Patient presented to [facility] medically cleared for elective Left Total Hip Replacement Surgery, having failed outpatient conservative management. Prophylactic antibiotics were started before the procedure and continued for 24 hours. They were admitted after surgery to the orthopedic floor. There were no complications during the hospital stay. All home medications were continued.     **Pt received **U PRBC post op for Acute Blood Loss Anemia.    Routine consults were obtained from Physical Therapy and Occupational Therapy and from the Hospitalist for Medical Co-management. Patient was placed on anticoagulation. Pertinent home medications were continued. Daily labs were followed.      POD 0 pt was stable overnight. No major events post-op. Pt received PT twice daily. The plan is for DC to home with home PT** or to Rehab for ongoing PT**. The orthopedic Attending is aware and agrees.      **POD1 DC DOCUMENTATION** (Keep or Delete depending on scenario)  The patient had no post-operative complications and clinically progressed faster than anticipated. The following condition(s) were actively treated during the hospital stay:  DM  CAD  HTN  HLD  ESPERANZA  Irritable bowel syndrome (IBS)  Urinary Incontinence  Anxiety  The patient met criteria for discharge before the 2nd night of the stay. The patient was appropriately and safely discharged home with home PT.    ******INCOMPLETE NOTE IN PREPARATION FOR DISPO PLANNING*******  ******INCOMPLETE NOTE IN PREPARATION FOR DISPO PLANNING*******  ******INCOMPLETE NOTE IN PREPARATION FOR DISPO PLANNING*******     H&P:  Pt is a 83y Female    Multiple PMHx    Now s/p Left Total Hip Arthroplasty. Pt is afebrile with stable vital signs. Pain is controlled. Alert and Oriented. Exam reveals intact EHL FHL TA GS, +DP. Dressing is clean and dry.    Hospital Course:  Patient presented to [facility] medically cleared for elective Left Total Hip Replacement Surgery, having failed outpatient conservative management. Prophylactic antibiotics were started before the procedure and continued for 24 hours. They were admitted after surgery to the orthopedic floor. There were no complications during the hospital stay. All home medications were continued.     Routine consults were obtained from Physical Therapy and Occupational Therapy and from the Hospitalist for Medical Co-management. Patient was placed on anticoagulation. Pertinent home medications were continued. Daily labs were followed.      POD 0 pt was stable overnight. No major events post-op. Pt received PT twice daily. The plan is for DC  to Rehab for ongoing PT. The orthopedic Attending is aware and agrees.      POD1 DC DOCUMENTATION (Keep or Delete depending on scenario)  The patient had no post-operative complications and clinically progressed faster than anticipated. The following condition(s) were actively treated during the hospital stay:  DM  CAD  HTN  HLD  ESPERANZA  Irritable bowel syndrome (IBS)  Urinary Incontinence  Anxiety  The patient was appropriately and safely discharged to rehab

## 2024-05-02 NOTE — DISCHARGE NOTE PROVIDER - NSDCMRMEDTOKEN_GEN_ALL_CORE_FT
Accupril 40 mg oral tablet: 1 tab(s) orally once a day  aspirin 81 mg oral tablet: 1 orally once a day Managed by cardiologist  Bentyl 20 mg oral tablet: 1 tab(s) orally every 6 hours as needed for  diarrhea  calcium (as carbonate) 600 mg oral tablet: 1 tab(s) orally once a day  carvedilol 25 mg oral tablet: 1 tab(s) orally 2 times a day  hydrALAZINE 10 mg oral tablet: 3 tab(s) orally 2 times a day  Janumet 50 mg-1000 mg oral tablet: 1 tab(s) orally 2 times a day  Lipitor 20 mg oral tablet: 1 tab(s) orally once a day (at bedtime)  Lomotil 2.5 mg-0.025 mg oral tablet: 2 tab(s) orally every 8 hours as needed for  diarrhea  melatonin 3 mg oral tablet: 1 tab(s) orally once a day (at bedtime)  MiraLax oral powder for reconstitution: 17 gram(s) orally once a day as needed for  constipation  Multiple Vitamins oral tablet: 1 tab(s) orally once a day  omeprazole 40 mg oral delayed release capsule: 1 cap(s) orally once a day TAKE daily while on post-op Xarelto; RESUME as needed for heartburn when Xarelto course complete  oxyCODONE 5 mg oral tablet: 1 tab(s) orally every 4 hours as needed for  severe pain MDD: 6  Protonix 40 mg oral delayed release tablet: 1 tab(s) orally once a day  Senna 8.6 mg oral tablet: 2 tab(s) orally once a day (at bedtime) as needed for  constipation  Singulair 10 mg oral tablet: 1 tab(s) orally once a day (at bedtime)  Symbicort 160 mcg-4.5 mcg/inh inhalation aerosol: 2 puff(s) inhaled every 6 hours as needed for  shortness of breath and/or wheezing  Synthroid 100 mcg (0.1 mg) oral tablet: 1 tab(s) orally once a day 6 days per week , Not taken on Sundays  Tylenol Extra Strength 500 mg oral tablet: 2 tab(s) orally every 8 hours  Vitamin B12 500 mcg oral tablet: 1 tab(s) orally once a day  Vitamin C 500 mg oral capsule: 1 cap(s) orally once a day  Vitamin D3 50 mcg (2000 intl units) oral capsule: 1 cap(s) orally once a day  Xanax 0.5 mg oral tablet: 1 tab(s) orally once a day as needed for  anxiety  Xarelto 10 mg oral tablet: 1 tab(s) orally once a day (at bedtime) Blood Clot Prevention Total Hip , take AFTER surgery MDD: 1  zinc (as acetate) 50 mg oral capsule: 1 cap(s) orally once a day   Accupril 40 mg oral tablet: 1 tab(s) orally once a day  aspirin 81 mg oral tablet: 1 orally once a day Managed by cardiologist  Bentyl 20 mg oral tablet: 1 tab(s) orally every 6 hours as needed for  diarrhea  calcium (as carbonate) 600 mg oral tablet: 1 tab(s) orally once a day  carvedilol 25 mg oral tablet: 1 tab(s) orally 2 times a day  hydrALAZINE 10 mg oral tablet: 3 tab(s) orally 2 times a day  Janumet 50 mg-1000 mg oral tablet: 1 tab(s) orally 2 times a day  Lipitor 20 mg oral tablet: 1 tab(s) orally once a day (at bedtime)  Lomotil 2.5 mg-0.025 mg oral tablet: 2 tab(s) orally every 8 hours as needed for  diarrhea  melatonin 3 mg oral tablet: 1 tab(s) orally once a day (at bedtime)  MiraLax oral powder for reconstitution: 17 gram(s) orally once a day as needed for  constipation  Multiple Vitamins oral tablet: 1 tab(s) orally once a day  omeprazole 40 mg oral delayed release capsule: 1 cap(s) orally once a day TAKE daily while on post-op Xarelto; RESUME as needed for heartburn when Xarelto course complete  oxyCODONE 5 mg oral tablet: 1 tab(s) orally every 4 hours as needed for  severe pain MDD: 6  Senna 8.6 mg oral tablet: 2 tab(s) orally once a day (at bedtime) as needed for  constipation  Singulair 10 mg oral tablet: 1 tab(s) orally once a day (at bedtime)  Symbicort 160 mcg-4.5 mcg/inh inhalation aerosol: 2 puff(s) inhaled every 6 hours as needed for  shortness of breath and/or wheezing  Synthroid 100 mcg (0.1 mg) oral tablet: 1 tab(s) orally once a day 6 days per week , Not taken on Sundays  Tylenol Extra Strength 500 mg oral tablet: 2 tab(s) orally every 8 hours  Vitamin B12 500 mcg oral tablet: 1 tab(s) orally once a day  Vitamin C 500 mg oral capsule: 1 cap(s) orally once a day  Vitamin D3 50 mcg (2000 intl units) oral capsule: 1 cap(s) orally once a day  Xanax 0.5 mg oral tablet: 1 tab(s) orally once a day as needed for  anxiety  Xarelto 10 mg oral tablet: 1 tab(s) orally once a day (at bedtime) Blood Clot Prevention Total Hip , take AFTER surgery MDD: 1  zinc (as acetate) 50 mg oral capsule: 1 cap(s) orally once a day

## 2024-05-02 NOTE — DISCHARGE NOTE PROVIDER - CARE PROVIDER_API CALL
BENJAMIN REZA  [address] [place] [id number]  Phone: [phone]  Follow Up Time:

## 2024-05-02 NOTE — PHYSICAL THERAPY INITIAL EVALUATION ADULT - PHYSICAL ASSIST/NONPHYSICAL ASSIST: SIT/STAND, REHAB EVAL
Quality 110: Preventive Care And Screening: Influenza Immunization: Influenza Immunization not Administered for Documented Reasons. Quality 226: Preventive Care And Screening: Tobacco Use: Screening And Cessation Intervention: Tobacco Screening not Performed for Medical Reasons Quality 431: Preventive Care And Screening: Unhealthy Alcohol Use - Screening: Patient screened for unhealthy alcohol use using a single question and scores less than 2 times per year Detail Level: Detailed verbal cues/nonverbal cues (demo/gestures)/1 person assist

## 2024-05-02 NOTE — BRIEF OPERATIVE NOTE - NSICDXBRIEFPROCEDURE_GEN_ALL_CORE_FT
PROCEDURES:  Left total hip arthroplasty 02-May-2024 11:40:05  Johan Del Toro   Tissue Cultured Epidermal Autograft Text: The defect edges were debeveled with a #15 scalpel blade.  Given the location of the defect, shape of the defect and the proximity to free margins a tissue cultured epidermal autograft was deemed most appropriate.  The graft was then trimmed to fit the size of the defect.  The graft was then placed in the primary defect and oriented appropriately.

## 2024-05-02 NOTE — PHYSICAL THERAPY INITIAL EVALUATION ADULT - GAIT DISTANCE, PT EVAL
Anesthesia PreOp Note    HPI:     Maxwell Cameron is a 60 year old male who presents for preoperative consultation requested by: Kenneth Liao MD    Date of Surgery: 4/25/2024 - 4/26/2024    Procedure(s):  Lumbar L4-5 laminectomy with medial facetectomy and discectomy  Indication: Lumbar spondylolisthsis with radiculopathy    Relevant Problems   No relevant active problems       NPO:  Last Liquid Consumption Date: 04/25/24  Last Liquid Consumption Time: 2230  Last Solid Consumption Date: 04/25/24  Last Solid Consumption Time: 1900  Last Liquid Consumption Date: 04/25/24          History Review:  Patient Active Problem List    Diagnosis Date Noted    Left leg pain 04/25/2024    Hyperglycemia 04/25/2024    L4-L5 disc bulge 04/25/2024    Lumbar radiculopathy 04/25/2024       Past Medical History:    Essential hypertension    Gout    Hyperlipidemia    Thyroid disease       Past Surgical History:   Procedure Laterality Date    Excis lumbar disk,one level         Medications Prior to Admission   Medication Sig Dispense Refill Last Dose    atorvastatin 20 MG Oral Tab Take 1 tablet (20 mg total) by mouth daily.   4/25/2024 at 0300    lisinopril 20 MG Oral Tab Take 1 tablet (20 mg total) by mouth daily.   4/25/2024 at 0300    allopurinol 300 MG Oral Tab Take 1 tablet (300 mg total) by mouth daily.   4/25/2024 at 0300    levothyroxine 50 MCG Oral Tab Take 1 tablet (50 mcg total) by mouth before breakfast.   4/25/2024 at 0300     Current Facility-Administered Medications Ordered in Epic   Medication Dose Route Frequency Provider Last Rate Last Admin    [Transfer Hold] acetaminophen (Tylenol) tab 650 mg  650 mg Oral Q4H PRN Fredis Pang MD        Or    [Transfer Hold] HYDROcodone-acetaminophen (Norco) 5-325 MG per tab 1 tablet  1 tablet Oral Q4H PRFredis Campbell MD        Or    [Transfer Hold] HYDROcodone-acetaminophen (Norco) 5-325 MG per tab 2 tablet  2 tablet Oral Q4H PRFredis Campbell MD        [Transfer Hold]  morphINE PF 2 MG/ML injection 1 mg  1 mg Intravenous Q2H PRN Fredis Pang MD        Or    [Transfer Hold] morphINE PF 2 MG/ML injection 2 mg  2 mg Intravenous Q2H PRN Fredis Pang MD   2 mg at 04/25/24 1543    Or    [Transfer Hold] morphINE PF 4 MG/ML injection 4 mg  4 mg Intravenous Q2H PRN Fredis Pang MD   4 mg at 04/25/24 2035    [Transfer Hold] melatonin tab 3 mg  3 mg Oral Nightly PRN Fredis Pang MD        [Transfer Hold] polyethylene glycol (PEG 3350) (Miralax) 17 g oral packet 17 g  17 g Oral Daily PRN Fredis Pang MD        [Transfer Hold] sennosides (Senokot) tab 17.2 mg  17.2 mg Oral Nightly PRN Fredis Pang MD        [Transfer Hold] bisacodyl (Dulcolax) 10 MG rectal suppository 10 mg  10 mg Rectal Daily PRN Fredis Pang MD        [Transfer Hold] fleet enema (Fleet) 7-19 GM/118ML rectal enema 133 mL  1 enema Rectal Once PRN Fredis Pang MD        [Transfer Hold] ondansetron (Zofran) 4 MG/2ML injection 4 mg  4 mg Intravenous Q6H PRN Fredis Pang MD        [Transfer Hold] prochlorperazine (Compazine) 10 MG/2ML injection 5 mg  5 mg Intravenous Q8H PRN Fredis Pang MD        [Transfer Hold] naproxen (Naprosyn) tab 500 mg  500 mg Oral Q12H Fredis Pang MD        [Transfer Hold] allopurinol (Zyloprim) tab 300 mg  300 mg Oral Daily Fredis Pang MD        [Transfer Hold] atorvastatin (Lipitor) tab 20 mg  20 mg Oral Daily Fredis Pang MD        [Transfer Hold] levothyroxine (Synthroid) tab 50 mcg  50 mcg Oral Before breakfast Fredis Pang MD        [Transfer Hold] lisinopril (Prinivil; Zestril) tab 20 mg  20 mg Oral Daily Fredis Pang MD        piperacillin-tazobactam (Zosyn) 4.5 g in dextrose 5% 100 mL IVPB-ADDV  4.5 g Intravenous Q8H Fredis Pang MD 25 mL/hr at 04/26/24 1105 4.5 g at 04/26/24 1105     Current Outpatient Medications Ordered in Epic   Medication Sig Dispense Refill    HYDROcodone-acetaminophen 10325  MG Oral Tab Take 1 tablet by mouth every 6 (six) hours as needed for Pain. 28 tablet 0    methocarbamol 750 MG Oral Tab Take 1 tablet (750 mg total) by mouth 3 (three) times daily for 10 days. 30 tablet 0    methylPREDNISolone (MEDROL) 4 MG Oral Tablet Therapy Pack Dosepack: take as directed 1 each 0       No Known Allergies    No family history on file.  Social History     Socioeconomic History    Marital status:    Tobacco Use    Smoking status: Never    Smokeless tobacco: Never   Substance and Sexual Activity    Alcohol use: Not Currently    Drug use: Never       Available pre-op labs reviewed.  Lab Results   Component Value Date    WBC 7.6 04/26/2024    RBC 5.26 04/26/2024    HGB 14.8 04/26/2024    HCT 45.2 04/26/2024    MCV 85.9 04/26/2024    MCH 28.1 04/26/2024    MCHC 32.7 04/26/2024    RDW 14.4 04/26/2024    .0 04/26/2024     Lab Results   Component Value Date     04/26/2024    K 3.9 04/26/2024     04/26/2024    CO2 27.0 04/26/2024    BUN 14 04/26/2024    CREATSERUM 0.89 04/26/2024    GLU 88 04/26/2024    CA 9.5 04/26/2024     Lab Results   Component Value Date    INR 0.97 04/25/2024       Vital Signs:  Body mass index is 36.11 kg/m².   height is 1.854 m (6' 1\") and weight is 124.1 kg (273 lb 11.2 oz). His oral temperature is 97.7 °F (36.5 °C). His blood pressure is 138/71 and his pulse is 57. His respiration is 17 and oxygen saturation is 98%.   Vitals:    04/26/24 0546 04/26/24 0753 04/26/24 1102 04/26/24 1154   BP: 134/82 134/75 138/77 138/71   Pulse: 65 60 61 57   Resp: 16 18 18 17   Temp: 97.8 °F (36.6 °C) 97.8 °F (36.6 °C) 97.7 °F (36.5 °C)    TempSrc: Oral Oral Oral    SpO2: 96% 97% 96% 98%   Weight:       Height:            Anesthesia Evaluation     Patient summary reviewed and Nursing notes reviewed    Airway   Mallampati: II  TM distance: >3 FB  Neck ROM: full  Dental      Pulmonary - normal exam    breath sounds clear to auscultation  Cardiovascular - normal exam  (+)  hypertension    Rhythm: regular  Rate: normal    Neuro/Psych    (+)  neuromuscular disease,        GI/Hepatic/Renal      Endo/Other    (+) hypothyroidism  Abdominal                  Anesthesia Plan:   ASA:  3  Plan:   General  Airway:  ETT  Post-op Pain Management: IV analgesics  Informed Consent Plan and Risks Discussed With:  Patient  Discussed plan with:  Surgeon      I have informed Maxwell Cameron and/or legal guardian or family member of the nature of the anesthetic plan, benefits, risks including possible dental damage if relevant, major complications, and any alternative forms of anesthetic management.   All of the patient's questions were answered to the best of my ability. The patient desires the anesthetic management as planned.  DEXTER VIVAS MD  4/26/2024 12:57 PM  Present on Admission:   Hyperglycemia       1 step towards HOB

## 2024-05-02 NOTE — CONSULT NOTE ADULT - ASSESSMENT
IMPRESSION:      82 yo with above pmh a/w:      # OA left hip   # S/P left PARAMJIT  POD#0  pain regimen PRN   PT eval  Bowel regimen  IVF hydration   C/W prophylactic ABT   regular diet as tolerated   PPI  VTE prophylaxis  monitor CBC/BMP      # HTN  pt at high risk for fluctuations in B/P 2/2 anesthesia, pain, hypovolemia and use of narcotics, placing pt at high risk for MI/CVA  monitor B/P closely  adjust anti-htn's accordingly  on carvedilol and ACS       # HLD- statin    *DM  - ISS  - on JAnumet at home- resume on dc   - diabetic diet    # VTE prophylaxis  Venodynes  INC mobility  CAPRINI score 10-  xarelto x 4 weeks       Thank you for the consult, will follow.     CAPRINI SCORE    AGE RELATED RISK FACTORS                                                             [ ] Age 41-60 years                                            (1 Point)  [ ] Age: 61-74 years                                           (2 Points)                 [x ] Age= 75 years                                                (3 Points)             DISEASE RELATED RISK FACTORS                                                       [ x] Edema in the lower extremities                 (1 Point)                     [ ] Varicose veins                                               (1 Point)                                 [x ] BMI > 25 Kg/m2                                            (1 Point)                                  [ ] Serious infection (ie PNA)                            (1 Point)                     [ ] Lung disease ( COPD, Emphysema)            (1 Point)                                                                          [ ] Acute myocardial infarction                         (1 Point)                  [ ] Congestive heart failure (in the previous month)  (1 Point)         [ ] Inflammatory bowel disease                            (1 Point)                  [ ] Central venous access, PICC or Port               (2 points)       (within the last month)                                                                [ ] Stroke (in the previous month)                        (5 Points)    [x ] Previous or present malignancy                       (2 points)                                                                                                                                                         HEMATOLOGY RELATED FACTORS                                                         [ ] Prior episodes of VTE                                     (3 Points)                     [ ] Positive family history for VTE                      (3 Points)                  [ ] Prothrombin 39161 A                                     (3 Points)                     [ ] Factor V Leiden                                                (3 Points)                        [ ] Lupus anticoagulants                                      (3 Points)                                                           [ ] Anticardiolipin antibodies                              (3 Points)                                                       [ ] High homocysteine in the blood                   (3 Points)                                             [ ] Other congenital or acquired thrombophilia      (3 Points)                                                [ ] Heparin induced thrombocytopenia                  (3 Points)                                        MOBILITY RELATED FACTORS  [ ] Bed rest                                                         (1 Point)  [ ] Plaster cast                                                    (2 points)  [ ] Bed bound for more than 72 hours           (2 Points)    GENDER SPECIFIC FACTORS  [ ] Pregnancy or had a baby within the last month   (1 Point)  [ ] Post-partum < 6 weeks                                   (1 Point)  [ ] Hormonal therapy  or oral contraception   (1 Point)  [ ] History of pregnancy complications              (1 point)  [ ] Unexplained or recurrent              (1 Point)    OTHER RISK FACTORS                                           (1 Point)  [ ] BMI >40, smoking, diabetes requiring insulin, chemotherapy  blood transfusions and length of surgery over 2 hours    SURGERY RELATED RISK FACTORS  [ ]  Section within the last month     (1 Point)  [ ] Minor surgery                                                  (1 Point)  [ ] Arthroscopic surgery                                       (2 Points)  [ ] Planned major surgery lasting more            (2 Points)      than 45 minutes     [ x] Elective hip or knee joint replacement       (5 points)       surgery                                                TRAUMA RELATED RISK FACTORS  [ ] Fracture of the hip, pelvis, or leg                       (5 Points)  [ ] Spinal cord injury resulting in paralysis             (5 points)       (in the previous month)    [ ] Paralysis  (less than 1 month)                             (5 Points)  [ ] Multiple Trauma within 1 month                        (5 Points)    Total Score [  10     ]    Caprini Score 0-2: Low Risk, NO VTE prophylaxis required for most patients, encourage ambulation  Caprini Score 3-6: Moderate Risk , pharmacologic VTE prophylaxis is indicated for most patients (in the absence of contraindications)  Caprini Score Greater than or =7: High risk, pharmocologic VTE prophylaxis indicated for most patients (in the absence of contraindications)

## 2024-05-02 NOTE — CONSULT NOTE ADULT - NS ATTEND AMEND GEN_ALL_CORE FT
Chart and meds reviewed.  Case d/w OTONIEL Dodd.  Agree with the plan of care as outlined by OTONIEL Dodd.    83 F with Hx of Anxiety, CAD, DDD, HTN, HLD, left hip OA, Type 2 DM, ESPERANZA, Hypothyroidism, IBS, was admitted for left total hip replacement.    Plan:  -  pain control  -  incentive spirometry  -  OOB to chair / ambulate  -  hip precautions  -  check labs in am  -  wound care per ortho  -  complete perioperative ABXs  -  low salt, low cholesterol, ADA diet  -  check sugars qAC/HS, Admelog SSI  -  d/c Jamumet  -  home medications  -  BP control  -  DVT prophylaxis    d/w OTONIEL Dodd

## 2024-05-02 NOTE — CONSULT NOTE ADULT - SUBJECTIVE AND OBJECTIVE BOX
PCP: Dr Patel     CHIEF COMPLAINT:  left hip OA s/p Left PARAMJIT     HISTORY OF THE PRESENT ILLNESS:     84yo woman with history of Anxiety, CAD, DDD, HTN, HLD, left hip OA,  with progressive pain with ambulation, climbing stairs and increasing  pain at night, who failed the usual modalities for pain and is now s/p left PARAMJIT with Dr. Vides .  Pt was seen in PACU, in NAD. Denies SOB, CP. Hospitalist consulted for medical management.       PAST MEDICAL & SURGICAL HISTORY:  Rotator cuff tear  HTN (hypertension)  HLD (hyperlipidemia)  Type II diabetes mellitus  ESPERANZA (obstructive sleep apnea)  Irritable bowel syndrome (IBS)  History of sepsis  S/P right hip fracture  Diverticular disease  CTS (carpal tunnel syndrome)  Humeral head fracture  History of falling  DDD (degenerative disc disease), lumbosacral  History of hearing loss  History of tinnitus  Fibrocystic breast  Anxiety  CAD (coronary artery disease)  Urinary incontinence  History of tonsillectomy  History of hysterectomy  H/O varicose vein stripping  History of cholecystectomy  History of left knee surgery  History of right hip replacement  History of cystoscopy  History of colon resection  H/O total knee replacement, right  History of bilateral carpal tunnel release  Venous insufficiency of lower extremity  History of basal cell carcinoma  History of basal cell carcinoma excision  History of cataract surgery          FAMILY HISTORY:  FHx: Parkinson's disease (Mother)  Family history of bladder cancer (Father)  Family history of heart attack (Father)  Family history of pancreatic cancer (Sibling)        Social History: denies smoking  drinks on occasions  denies substance or drug use       Allergies  Levaquin (Other)  Intolerances        Home Medications:  Accupril 40 mg oral tablet: 1 tab(s) orally once a day (02 May 2024 07:59)  aspirin 81 mg oral tablet: 1 orally once a day Managed by cardiologist (02 May 2024 07:59)  Bentyl 20 mg oral tablet: 1 tab(s) orally every 6 hours as needed for  diarrhea (02 May 2024 07:59)  calcium (as carbonate) 600 mg oral tablet: 1 tab(s) orally once a day (02 May 2024 07:59)  carvedilol 25 mg oral tablet: 1 tab(s) orally 2 times a day (02 May 2024 07:59)  hydrALAZINE 10 mg oral tablet: 3 tab(s) orally 2 times a day (02 May 2024 07:59)  Janumet 50 mg-1000 mg oral tablet: 1 tab(s) orally 2 times a day (02 May 2024 07:59)  Lipitor 20 mg oral tablet: 1 tab(s) orally once a day (at bedtime) (02 May 2024 07:59)  Lomotil 2.5 mg-0.025 mg oral tablet: 2 tab(s) orally every 8 hours as needed for  diarrhea (02 May 2024 07:59)  melatonin 3 mg oral tablet: 1 tab(s) orally once a day (at bedtime) (02 May 2024 07:59)  Multiple Vitamins oral tablet: 1 tab(s) orally once a day (02 May 2024 07:59)  omeprazole 40 mg oral delayed release capsule: 1 cap(s) orally once a day TAKE daily while on post-op Xarelto; RESUME as needed for heartburn when Xarelto course complete (02 May 2024 10:21)  Singulair 10 mg oral tablet: 1 tab(s) orally once a day (at bedtime) (02 May 2024 07:59)  Symbicort 160 mcg-4.5 mcg/inh inhalation aerosol: 2 puff(s) inhaled every 6 hours as needed for  shortness of breath and/or wheezing (02 May 2024 07:59)  Synthroid 100 mcg (0.1 mg) oral tablet: 1 tab(s) orally once a day 6 days per week , Not taken on Sundays (02 May 2024 07:59)  Vitamin B12 500 mcg oral tablet: 1 tab(s) orally once a day (02 May 2024 07:59)  Vitamin C 500 mg oral capsule: 1 cap(s) orally once a day (02 May 2024 07:59)  Vitamin D3 50 mcg (2000 intl units) oral capsule: 1 cap(s) orally once a day (02 May 2024 07:59)  Xanax 0.5 mg oral tablet: 1 tab(s) orally once a day as needed for  anxiety (02 May 2024 07:59)  Xarelto 10 mg oral tablet: 1 tab(s) orally once a day (at bedtime) Blood Clot Prevention Total Hip , take AFTER surgery MDD: 1 (02 May 2024 07:57)  zinc (as acetate) 50 mg oral capsule: 1 cap(s) orally once a day (02 May 2024 07:59)        Vital Signs Last 24 Hrs  T(C): 36.1 (02 May 2024 11:27), Max: 36.6 (02 May 2024 07:33)  T(F): 97 (02 May 2024 11:27), Max: 97.8 (02 May 2024 07:33)  HR: 46 (02 May 2024 12:15) (46 - 59)  BP: 89/40 (02 May 2024 12:15) (63/42 - 102/48)  BP(mean): --  RR: 17 (02 May 2024 12:15) (15 - 20)  SpO2: 97% (02 May 2024 12:15) (94% - 100%)    Parameters below as of 02 May 2024 11:27  Patient On (Oxygen Delivery Method): nasal cannula  O2 Flow (L/min): 2        REVIEW OF SYSTEMS:   All 10 systems reviewed in detailed and found to be negative with the exception of what has already been described above    MEDICATIONS  (STANDING):  lactated ringers. 1000 milliLiter(s) (75 mL/Hr) IV Continuous <Continuous>  tranexamic acid IVPB 1000 milliGRAM(s) IV Intermittent once  tranexamic acid IVPB 1000 milliGRAM(s) IV Intermittent once    MEDICATIONS  (PRN):  fentaNYL    Injectable 25 MICROGram(s) IV Push every 5 minutes PRN Moderate Pain (4 - 6)  ondansetron Injectable 4 milliGRAM(s) IV Push once PRN Nausea and/or Vomiting  oxyCODONE    IR 5 milliGRAM(s) Oral once PRN Moderate Pain (4 - 6)      PE:  Constitutional: NAD, laying in bed  HEENT: NC/AT  Back: no tenderness  Respiratory: respirations even and non labored, LCTA  Cardiovascular: S1S2 regular, no murmurs  Abdomen: soft, not tender, not distended, positive BS  Genitourinary: voiding  Musculoskeletal: no muscle tenderness, no joint swelling or tenderness  Extremities: no pedal edema   Neurological: no focal deficits    LABS:  preop labs-reviewed

## 2024-05-02 NOTE — PHYSICAL THERAPY INITIAL EVALUATION ADULT - MANUAL MUSCLE TESTING RESULTS, REHAB EVAL
Grossly assessed to be 3+/5 on BUE/LE within available range while adhering to L hip precautions./grossly assessed due to

## 2024-05-02 NOTE — PHYSICAL THERAPY INITIAL EVALUATION ADULT - TRANSFER SAFETY CONCERNS NOTED: SIT/STAND, REHAB EVAL
decreased balance during turns/decreased safety awareness/losing balance/inability to maintain weight-bearing restrictions w/o assist/decreased weight-shifting ability

## 2024-05-02 NOTE — PHYSICAL THERAPY INITIAL EVALUATION ADULT - ADDITIONAL COMMENTS
Patient described living [place] with 4STE with adult son and daughter. Family both work and patient is alone for periods of time. Utilized RW for ambulation at all times.

## 2024-05-02 NOTE — PHYSICAL THERAPY INITIAL EVALUATION ADULT - PERTINENT HX OF CURRENT PROBLEM, REHAB EVAL
84yo woman with history of Anxiety, CAD, DDD, HTN, HLD, left hip OA,  with progressive pain with ambulation, climbing stairs and increasing  pain at night, who failed the usual modalities for pain and is now s/p left PARAMJIT with Dr. Vides

## 2024-05-02 NOTE — DISCHARGE NOTE PROVIDER - NSDCFUSCHEDAPPT_GEN_ALL_CORE_FT
[facility]  [address] [facility]  Scheduled Appointment: 06/03/2024

## 2024-05-02 NOTE — PHYSICAL THERAPY INITIAL EVALUATION ADULT - GENERAL OBSERVATIONS, REHAB EVAL
The Pt was received on PACU in stretcher supine, pleasant, calm, cooperative, and willing to participate with PT, +PACU monitors, +O2, +abductor pillow, +B SCDs. Patient cleared to be seen for PT by RN. BP improving, was hypotensive post surgery according to NSG. Pt responded well to functional mobility trng, ambulation tx, and thera ex review. Required MOD A x 1 for bed mobility and MIN/MOD A x 1 for sit to stand. Patient able to take side step towards head of stretcher with no adverse effects noted. Assisted back into supine and adjusted for comfort. The Pt was in NAD at end of tx.  Hand off care to RN at bedside.

## 2024-05-02 NOTE — CONSULT NOTE ADULT - TIME BILLING
I spent a total of 80 minutes on the date of this encounter coordinating the patient's care. This includes reviewing prior documentation, results and imaging in addition to completing a full history and physical examination on the patient. Further tests, medications, and procedures have been ordered as indicated. Laboratory results and the plan of care were communicated to the patient and/or their family member. Supporting documentation was completed and added to the patient's chart.

## 2024-05-02 NOTE — PHYSICAL THERAPY INITIAL EVALUATION ADULT - MODALITIES TREATMENT COMMENTS
Patient education on posterior hip precautions, falls risk reduction, therex review and the overall impact on the pt's ADLs and safety.

## 2024-05-03 LAB
ANION GAP SERPL CALC-SCNC: 6 MMOL/L — SIGNIFICANT CHANGE UP (ref 5–17)
BUN SERPL-MCNC: 23 MG/DL — SIGNIFICANT CHANGE UP (ref 7–23)
CALCIUM SERPL-MCNC: 8.4 MG/DL — LOW (ref 8.5–10.1)
CHLORIDE SERPL-SCNC: 107 MMOL/L — SIGNIFICANT CHANGE UP (ref 96–108)
CO2 SERPL-SCNC: 24 MMOL/L — SIGNIFICANT CHANGE UP (ref 22–31)
CREAT SERPL-MCNC: 0.76 MG/DL — SIGNIFICANT CHANGE UP (ref 0.5–1.3)
EGFR: 77 ML/MIN/1.73M2 — SIGNIFICANT CHANGE UP
GLUCOSE BLDC GLUCOMTR-MCNC: 143 MG/DL — HIGH (ref 70–99)
GLUCOSE BLDC GLUCOMTR-MCNC: 163 MG/DL — HIGH (ref 70–99)
GLUCOSE BLDC GLUCOMTR-MCNC: 173 MG/DL — HIGH (ref 70–99)
GLUCOSE BLDC GLUCOMTR-MCNC: 182 MG/DL — HIGH (ref 70–99)
GLUCOSE SERPL-MCNC: 147 MG/DL — HIGH (ref 70–99)
HCT VFR BLD CALC: 27.9 % — LOW (ref 34.5–45)
HGB BLD-MCNC: 9.3 G/DL — LOW (ref 11.5–15.5)
MCHC RBC-ENTMCNC: 31 PG — SIGNIFICANT CHANGE UP (ref 27–34)
MCHC RBC-ENTMCNC: 33.3 GM/DL — SIGNIFICANT CHANGE UP (ref 32–36)
MCV RBC AUTO: 93 FL — SIGNIFICANT CHANGE UP (ref 80–100)
PLATELET # BLD AUTO: 243 K/UL — SIGNIFICANT CHANGE UP (ref 150–400)
POTASSIUM SERPL-MCNC: 3.6 MMOL/L — SIGNIFICANT CHANGE UP (ref 3.5–5.3)
POTASSIUM SERPL-SCNC: 3.6 MMOL/L — SIGNIFICANT CHANGE UP (ref 3.5–5.3)
RBC # BLD: 3 M/UL — LOW (ref 3.8–5.2)
RBC # FLD: 14 % — SIGNIFICANT CHANGE UP (ref 10.3–14.5)
SODIUM SERPL-SCNC: 137 MMOL/L — SIGNIFICANT CHANGE UP (ref 135–145)
WBC # BLD: 11.1 K/UL — HIGH (ref 3.8–10.5)
WBC # FLD AUTO: 11.1 K/UL — HIGH (ref 3.8–10.5)

## 2024-05-03 PROCEDURE — 99232 SBSQ HOSP IP/OBS MODERATE 35: CPT

## 2024-05-03 RX ADMIN — ATORVASTATIN CALCIUM 20 MILLIGRAM(S): 80 TABLET, FILM COATED ORAL at 21:41

## 2024-05-03 RX ADMIN — Medication 3 MILLIGRAM(S): at 21:41

## 2024-05-03 RX ADMIN — Medication 1: at 12:18

## 2024-05-03 RX ADMIN — Medication 1000 MILLIGRAM(S): at 13:04

## 2024-05-03 RX ADMIN — Medication 1000 MILLIGRAM(S): at 05:23

## 2024-05-03 RX ADMIN — BUDESONIDE AND FORMOTEROL FUMARATE DIHYDRATE 2 PUFF(S): 160; 4.5 AEROSOL RESPIRATORY (INHALATION) at 21:56

## 2024-05-03 RX ADMIN — Medication 2000 MILLIGRAM(S): at 02:03

## 2024-05-03 RX ADMIN — Medication 1000 MILLIGRAM(S): at 22:12

## 2024-05-03 RX ADMIN — RIVAROXABAN 10 MILLIGRAM(S): KIT at 09:33

## 2024-05-03 RX ADMIN — CELECOXIB 200 MILLIGRAM(S): 200 CAPSULE ORAL at 21:41

## 2024-05-03 RX ADMIN — Medication 1 TABLET(S): at 21:41

## 2024-05-03 RX ADMIN — Medication 1 TABLET(S): at 13:04

## 2024-05-03 RX ADMIN — CELECOXIB 200 MILLIGRAM(S): 200 CAPSULE ORAL at 22:11

## 2024-05-03 RX ADMIN — Medication 100 MICROGRAM(S): at 08:05

## 2024-05-03 RX ADMIN — CELECOXIB 200 MILLIGRAM(S): 200 CAPSULE ORAL at 09:33

## 2024-05-03 RX ADMIN — Medication 1000 MILLIGRAM(S): at 21:42

## 2024-05-03 RX ADMIN — Medication 1: at 17:34

## 2024-05-03 RX ADMIN — Medication 1 TABLET(S): at 09:33

## 2024-05-03 RX ADMIN — Medication 1000 MILLIGRAM(S): at 05:38

## 2024-05-03 RX ADMIN — BUDESONIDE AND FORMOTEROL FUMARATE DIHYDRATE 2 PUFF(S): 160; 4.5 AEROSOL RESPIRATORY (INHALATION) at 17:41

## 2024-05-03 RX ADMIN — MONTELUKAST 10 MILLIGRAM(S): 4 TABLET, CHEWABLE ORAL at 21:41

## 2024-05-03 RX ADMIN — Medication 81 MILLIGRAM(S): at 17:34

## 2024-05-03 RX ADMIN — Medication 1 TABLET(S): at 05:23

## 2024-05-03 RX ADMIN — CELECOXIB 200 MILLIGRAM(S): 200 CAPSULE ORAL at 09:43

## 2024-05-03 RX ADMIN — Medication 1: at 08:05

## 2024-05-03 NOTE — OCCUPATIONAL THERAPY INITIAL EVALUATION ADULT - COGNITIVE, VISUAL PERCEPTUAL, OT EVAL
Patient will recall and adhere to 3/3 posterior hip precautions with 100% accuracy within 2 weeks in order to increase safety with ADL/IADL and functional mobility participation

## 2024-05-03 NOTE — PROGRESS NOTE ADULT - ASSESSMENT
IMPRESSION:      82 yo with above pmh a/w:      # OA left hip   # S/P left PARAMJIT  POD#1  pain regimen PRN   PT eval  Bowel regimen  IVF hydration   C/W prophylactic ABT   regular diet as tolerated   PPI  VTE prophylaxis  monitor CBC/BMP      # HTN  pt at high risk for fluctuations in B/P 2/2 anesthesia, pain, hypovolemia and use of narcotics, placing pt at high risk for MI/CVA  monitor B/P closely  adjust anti-htn's accordingly  on carvedilol and ACS       # HLD- statin    *DM  - ISS  - on JAnumet at home- resume on dc   - diabetic diet    # VTE prophylaxis  Venodynes  INC mobility  CAPRINI score 10-  xarelto x 4 weeks       Thank you for the consult, will follow.

## 2024-05-03 NOTE — OCCUPATIONAL THERAPY INITIAL EVALUATION ADULT - ADDITIONAL COMMENTS
Patient described living in a ranch home with 4STE with adult son and daughter. Family both work and patient is alone for periods of time. Utilized RW for ambulation at all times. Patient resides in a ranch home with 4 ELISE with adult son and daughter. Family both work and patient is alone for periods of time. Utilized RW for ambulation at all times. Pt reports her daughter assisted with dressing and shower, she performed toileting (I), family performs IADLs. Pt owns a commode, TTB, and most pieces of the hip kit.

## 2024-05-03 NOTE — OCCUPATIONAL THERAPY INITIAL EVALUATION ADULT - PERTINENT HX OF CURRENT PROBLEM, REHAB EVAL
Pt is a 84 y/o woman with PMHx of Anxiety, CAD, DDD, HTN, HLD, left hip OA,  with progressive pain with ambulation, climbing stairs and increasing  pain at night, who failed the usual modalities for pain and is now s/p left PARAMJIT with Dr. Vides

## 2024-05-03 NOTE — OCCUPATIONAL THERAPY INITIAL EVALUATION ADULT - LEVEL OF INDEPENDENCE: SIT/STAND, REHAB EVAL
pending height of chair standing from/minimum assist (75% patients effort)/moderate assist (50% patients effort)

## 2024-05-03 NOTE — OCCUPATIONAL THERAPY INITIAL EVALUATION ADULT - LEVEL OF INDEPENDENCE: DRESS LOWER BODY, OT EVAL
dependent for footwear management, and approaching maximal assist but still dependent with AE for LBD/dependent (less than 25% patients effort)

## 2024-05-03 NOTE — OCCUPATIONAL THERAPY INITIAL EVALUATION ADULT - PRECAUTIONS/LIMITATIONS, REHAB EVAL
diet: CCD, left posterior hip precautions, abduction wedge/fall precautions/left hip precautions/surgical precautions

## 2024-05-03 NOTE — OCCUPATIONAL THERAPY INITIAL EVALUATION ADULT - MODALITIES TREATMENT COMMENTS
Pt left seated in bedside chair, chair alarmed, +abduction wedge, RN notified, lines intact, CB/TT/phone- items in reach.

## 2024-05-04 ENCOUNTER — TRANSCRIPTION ENCOUNTER (OUTPATIENT)
Age: 84
End: 2024-05-04

## 2024-05-04 VITALS
OXYGEN SATURATION: 96 % | DIASTOLIC BLOOD PRESSURE: 55 MMHG | TEMPERATURE: 98 F | RESPIRATION RATE: 16 BRPM | HEART RATE: 73 BPM | SYSTOLIC BLOOD PRESSURE: 111 MMHG

## 2024-05-04 LAB
ANION GAP SERPL CALC-SCNC: 6 MMOL/L — SIGNIFICANT CHANGE UP (ref 5–17)
BUN SERPL-MCNC: 25 MG/DL — HIGH (ref 7–23)
CALCIUM SERPL-MCNC: 8.9 MG/DL — SIGNIFICANT CHANGE UP (ref 8.5–10.1)
CHLORIDE SERPL-SCNC: 110 MMOL/L — HIGH (ref 96–108)
CO2 SERPL-SCNC: 26 MMOL/L — SIGNIFICANT CHANGE UP (ref 22–31)
CREAT SERPL-MCNC: 0.65 MG/DL — SIGNIFICANT CHANGE UP (ref 0.5–1.3)
EGFR: 86 ML/MIN/1.73M2 — SIGNIFICANT CHANGE UP
GLUCOSE BLDC GLUCOMTR-MCNC: 100 MG/DL — HIGH (ref 70–99)
GLUCOSE BLDC GLUCOMTR-MCNC: 190 MG/DL — HIGH (ref 70–99)
GLUCOSE SERPL-MCNC: 119 MG/DL — HIGH (ref 70–99)
HCT VFR BLD CALC: 29.5 % — LOW (ref 34.5–45)
HGB BLD-MCNC: 9.7 G/DL — LOW (ref 11.5–15.5)
MCHC RBC-ENTMCNC: 30.6 PG — SIGNIFICANT CHANGE UP (ref 27–34)
MCHC RBC-ENTMCNC: 32.9 GM/DL — SIGNIFICANT CHANGE UP (ref 32–36)
MCV RBC AUTO: 93.1 FL — SIGNIFICANT CHANGE UP (ref 80–100)
PLATELET # BLD AUTO: 262 K/UL — SIGNIFICANT CHANGE UP (ref 150–400)
POTASSIUM SERPL-MCNC: 4 MMOL/L — SIGNIFICANT CHANGE UP (ref 3.5–5.3)
POTASSIUM SERPL-SCNC: 4 MMOL/L — SIGNIFICANT CHANGE UP (ref 3.5–5.3)
RBC # BLD: 3.17 M/UL — LOW (ref 3.8–5.2)
RBC # FLD: 14.5 % — SIGNIFICANT CHANGE UP (ref 10.3–14.5)
SODIUM SERPL-SCNC: 142 MMOL/L — SIGNIFICANT CHANGE UP (ref 135–145)
WBC # BLD: 13.6 K/UL — HIGH (ref 3.8–10.5)
WBC # FLD AUTO: 13.6 K/UL — HIGH (ref 3.8–10.5)

## 2024-05-04 RX ADMIN — Medication 81 MILLIGRAM(S): at 05:24

## 2024-05-04 RX ADMIN — RIVAROXABAN 10 MILLIGRAM(S): KIT at 09:28

## 2024-05-04 RX ADMIN — Medication 1 TABLET(S): at 12:57

## 2024-05-04 RX ADMIN — Medication 100 MICROGRAM(S): at 08:20

## 2024-05-04 RX ADMIN — Medication 1 TABLET(S): at 05:24

## 2024-05-04 RX ADMIN — CELECOXIB 200 MILLIGRAM(S): 200 CAPSULE ORAL at 09:28

## 2024-05-04 RX ADMIN — Medication 1000 MILLIGRAM(S): at 12:57

## 2024-05-04 RX ADMIN — Medication 1 TABLET(S): at 09:28

## 2024-05-04 RX ADMIN — Medication 1000 MILLIGRAM(S): at 06:17

## 2024-05-04 RX ADMIN — Medication 1: at 12:03

## 2024-05-04 RX ADMIN — Medication 1000 MILLIGRAM(S): at 05:24

## 2024-05-04 RX ADMIN — BUDESONIDE AND FORMOTEROL FUMARATE DIHYDRATE 2 PUFF(S): 160; 4.5 AEROSOL RESPIRATORY (INHALATION) at 10:11

## 2024-05-04 NOTE — PROGRESS NOTE ADULT - SUBJECTIVE AND OBJECTIVE BOX
Patient is seen and examined at bedside ni PACU. Denies CP/SOB/Dizziness/N/V/D/HA. Pain is controlled. pt with low BP while in pacu, is bradycardic at baseline per patient.     Vital Signs Last 24 Hrs  T(C): 36.1 (02 May 2024 11:27), Max: 36.6 (02 May 2024 07:33)  T(F): 97 (02 May 2024 11:27), Max: 97.8 (02 May 2024 07:33)  HR: 58 (02 May 2024 14:00) (46 - 59)  BP: 115/52 (02 May 2024 14:00) (63/42 - 115/52)  BP(mean): --  RR: 17 (02 May 2024 14:00) (14 - 20)  SpO2: 100% (02 May 2024 14:00) (94% - 100%)    Parameters below as of 02 May 2024 11:27  Patient On (Oxygen Delivery Method): nasal cannula  O2 Flow (L/min): 2      GEN: NAD  LE: Dressing C/D/I. abduction pillow in place  Motor intact + EHL/FHL/TA/GS in the BL LE. Sensation is grossly intact distal . Extremity warm. Compartments are soft. DP intact with doppler     Labs:                          9.0    8.94  )-----------( 257      ( 02 May 2024 12:10 )             28.2       05-02    141  |  110<H>  |  36<H>  ----------------------------<  117<H>  4.1   |  23  |  0.80    Ca    8.8      02 May 2024 12:10        A/P: Patient is a 83y y/o Female s/p left PARAMJIT     -Pain control/analgesia  - monitor BP   -Inc spirometry  -Venodynes/foot pumps  -F/U AM Labs  -PT/OT/WBAT  -Antibiotic  -Anticoagulation  -Hip precautions  
Patient seen and examined at bedside. Patient reports pain well controlled on medications. No acute events overnight. Pt denies fevers, chills, new onset numbness, weakness or tingling in the extremities.    T(C): 36.4 (05-03-24 @ 04:00), Max: 36.7 (05-03-24 @ 00:18)  T(F): 97.5 (05-03-24 @ 04:00), Max: 98.1 (05-03-24 @ 00:18)  HR: 74 (05-03-24 @ 04:00) (46 - 76)  BP: 95/48 (05-03-24 @ 04:00) (63/42 - 147/73)  RR: 17 (05-03-24 @ 04:00) (14 - 22)  SpO2: 97% (05-03-24 @ 04:00) (94% - 100%)    GEN: NAD  LE: Dressing C/D/I. abduction pillow in place  Motor intact + EHL/FHL/TA/GS in the BL LE. Sensation is grossly intact distal . Extremity warm. Compartments are soft. DP intact       A/P: Patient is a 83y y/o Female s/p left PARAMJIT     -Pain control/analgesia  - monitor BP   -Inc spirometry  -Venodynes/foot pumps  -F/U AM Labs  -PT/OT/WBAT  -Antibiotic  -Anticoagulation  -Hip precautions
Patient seen and examined at bedside. Patient reports pain well controlled on medications. No acute events overnight. Pt denies fevers, chills, new onset numbness, weakness or tingling in the extremities.    Vital Signs (24 Hrs):  T(C): 36.5 (05-04-24 @ 07:36), Max: 36.9 (05-03-24 @ 12:32)  HR: 72 (05-04-24 @ 07:36) (66 - 76)  BP: 119/58 (05-04-24 @ 07:36) (92/53 - 119/58)  RR: 16 (05-04-24 @ 07:36) (16 - 18)  SpO2: 95% (05-04-24 @ 07:36) (94% - 100%)  Wt(kg): --    LABS:                          9.3    11.10 )-----------( 243      ( 03 May 2024 05:55 )             27.9     05-03    137  |  107  |  23  ----------------------------<  147<H>  3.6   |  24  |  0.76    Ca    8.4<L>      03 May 2024 05:55        Physical Exam:  GEN: NAD  LE: Dressing C/D/I. abduction pillow in place  Motor intact + EHL/FHL/TA/GS in the BL LE. Sensation is grossly intact distal . Extremity warm. Compartments are soft. DP intact       A/P: Patient is a 83y y/o Female s/p left PARAMJIT POD2     -Pain control/analgesia  -Monitor BP   -Inc spirometry  -Venodynes/foot pumps  -F/U AM Labs  -PT/OT/WBAT with posterior hip precautions  -DVT ppx: Xarelto  -Dispo JOHNNY  -Will discuss plan with Dr. Vides and will advise changes to plan as needed
  PCP: Dr Patel     CHIEF COMPLAINT:  left hip OA s/p Left PARAMJIT     5/3- Patient was seen and examined. VSS. No acute overnight events. Denies fever, pain or SOB. Tolerating diet. OOB to chair.       REVIEW OF SYSTEMS:   All 10 systems reviewed in detailed and found to be negative with the exception of what has already been described above    Vital Signs Last 24 Hrs  T(C): 36.9 (03 May 2024 08:00), Max: 36.9 (03 May 2024 08:00)  T(F): 98.4 (03 May 2024 08:00), Max: 98.4 (03 May 2024 08:00)  HR: 72 (03 May 2024 08:00) (46 - 76)  BP: 108/70 (03 May 2024 08:00) (89/40 - 147/73)  BP(mean): 81 (02 May 2024 19:47) (81 - 81)  RR: 17 (03 May 2024 08:00) (14 - 22)  SpO2: 97% (03 May 2024 08:00) (96% - 100%)    Parameters below as of 03 May 2024 08:00  Patient On (Oxygen Delivery Method): nasal cannula  O2 Flow (L/min): 2      MEDICATIONS  (STANDING):  acetaminophen     Tablet .. 1000 milliGRAM(s) Oral every 8 hours  aspirin enteric coated 81 milliGRAM(s) Oral daily  atorvastatin 20 milliGRAM(s) Oral at bedtime  budesonide 160 MICROgram(s)/formoterol 4.5 MICROgram(s) Inhaler 2 Puff(s) Inhalation two times a day  calcium carbonate 1250 mG  + Vitamin D (OsCal 500 + D) 1 Tablet(s) Oral three times a day  carvedilol 25 milliGRAM(s) Oral every 12 hours  celecoxib 200 milliGRAM(s) Oral every 12 hours  dextrose 10% Bolus 125 milliLiter(s) IV Bolus once  dextrose 10% Bolus 125 milliLiter(s) IV Bolus once  dextrose 5%. 1000 milliLiter(s) (100 mL/Hr) IV Continuous <Continuous>  dextrose 5%. 1000 milliLiter(s) (50 mL/Hr) IV Continuous <Continuous>  dextrose 5%. 1000 milliLiter(s) (100 mL/Hr) IV Continuous <Continuous>  dextrose 5%. 1000 milliLiter(s) (50 mL/Hr) IV Continuous <Continuous>  dextrose 50% Injectable 25 Gram(s) IV Push once  dextrose 50% Injectable 25 Gram(s) IV Push once  dextrose 50% Injectable 12.5 Gram(s) IV Push once  dextrose 50% Injectable 12.5 Gram(s) IV Push once  glucagon  Injectable 1 milliGRAM(s) IntraMuscular once  glucagon  Injectable 1 milliGRAM(s) IntraMuscular once  hydrALAZINE 30 milliGRAM(s) Oral two times a day  insulin lispro (ADMELOG) corrective regimen sliding scale   SubCutaneous three times a day before meals  insulin lispro (ADMELOG) corrective regimen sliding scale   SubCutaneous at bedtime  lactated ringers. 1000 milliLiter(s) (100 mL/Hr) IV Continuous <Continuous>  levothyroxine 100 MICROGram(s) Oral <User Schedule>  lisinopril 40 milliGRAM(s) Oral daily  montelukast 10 milliGRAM(s) Oral at bedtime  multivitamin 1 Tablet(s) Oral daily  pantoprazole    Tablet 40 milliGRAM(s) Oral before breakfast  polyethylene glycol 3350 17 Gram(s) Oral at bedtime  rivaroxaban 10 milliGRAM(s) Oral daily  senna 2 Tablet(s) Oral at bedtime  tranexamic acid IVPB 1000 milliGRAM(s) IV Intermittent once  tranexamic acid IVPB 1000 milliGRAM(s) IV Intermittent once    MEDICATIONS  (PRN):  dextrose Oral Gel 15 Gram(s) Oral once PRN Blood Glucose LESS THAN 70 milliGRAM(s)/deciliter  dextrose Oral Gel 15 Gram(s) Oral once PRN Blood Glucose LESS THAN 70 milliGRAM(s)/deciliter  HYDROmorphone  Injectable 0.5 milliGRAM(s) IV Push every 3 hours PRN Breakthrough pain  melatonin 3 milliGRAM(s) Oral at bedtime PRN Insomnia  ondansetron Injectable 4 milliGRAM(s) IV Push every 6 hours PRN Nausea and/or Vomiting  oxyCODONE    IR 5 milliGRAM(s) Oral every 3 hours PRN Moderate Pain (4 - 6)  oxyCODONE    IR 10 milliGRAM(s) Oral every 3 hours PRN Severe Pain (7 - 10)        PE:  Constitutional: NAD, laying in bed  HEENT: NC/AT  Back: no tenderness  Respiratory: respirations even and non labored, LCTA  Cardiovascular: S1S2 regular, no murmurs  Abdomen: soft, not tender, not distended, positive BS  Genitourinary: voiding  Musculoskeletal: no muscle tenderness, no joint swelling or tenderness  Extremities: no pedal edema   Neurological: no focal deficits     LABS:      05-03    137  |  107  |  23  ----------------------------<  147<H>  3.6   |  24  |  0.76    Ca    8.4<L>      03 May 2024 05:55                          9.3    11.10 )-----------( 243      ( 03 May 2024 05:55 )             27.9

## 2024-05-04 NOTE — DISCHARGE NOTE NURSING/CASE MANAGEMENT/SOCIAL WORK - PATIENT PORTAL LINK FT
You can access the FollowMyHealth Patient Portal offered by [facility] by registering at the following website: [email or web address]. By joining [facility]’s FollowMyHealth portal, you will also be able to view your health information using other applications (apps) compatible with our system.

## 2024-05-06 ENCOUNTER — NON-APPOINTMENT (OUTPATIENT)
Age: 84
End: 2024-05-06

## 2024-05-06 LAB — SURGICAL PATHOLOGY STUDY: SIGNIFICANT CHANGE UP

## 2024-05-08 ENCOUNTER — TRANSCRIPTION ENCOUNTER (OUTPATIENT)
Age: 84
End: 2024-05-08

## 2024-05-09 DIAGNOSIS — M16.12 UNILATERAL PRIMARY OSTEOARTHRITIS, LEFT HIP: ICD-10-CM

## 2024-05-09 DIAGNOSIS — I10 ESSENTIAL (PRIMARY) HYPERTENSION: ICD-10-CM

## 2024-05-09 DIAGNOSIS — E11.40 TYPE 2 DIABETES MELLITUS WITH DIABETIC NEUROPATHY, UNSPECIFIED: ICD-10-CM

## 2024-05-09 DIAGNOSIS — F41.9 ANXIETY DISORDER, UNSPECIFIED: ICD-10-CM

## 2024-05-09 DIAGNOSIS — K58.9 IRRITABLE BOWEL SYNDROME WITHOUT DIARRHEA: ICD-10-CM

## 2024-05-09 DIAGNOSIS — R32 UNSPECIFIED URINARY INCONTINENCE: ICD-10-CM

## 2024-05-09 DIAGNOSIS — Z79.890 HORMONE REPLACEMENT THERAPY: ICD-10-CM

## 2024-05-09 DIAGNOSIS — K21.9 GASTRO-ESOPHAGEAL REFLUX DISEASE WITHOUT ESOPHAGITIS: ICD-10-CM

## 2024-05-09 DIAGNOSIS — E78.5 HYPERLIPIDEMIA, UNSPECIFIED: ICD-10-CM

## 2024-05-09 DIAGNOSIS — Z79.899 OTHER LONG TERM (CURRENT) DRUG THERAPY: ICD-10-CM

## 2024-05-09 DIAGNOSIS — G47.33 OBSTRUCTIVE SLEEP APNEA (ADULT) (PEDIATRIC): ICD-10-CM

## 2024-05-09 DIAGNOSIS — I25.10 ATHEROSCLEROTIC HEART DISEASE OF NATIVE CORONARY ARTERY WITHOUT ANGINA PECTORIS: ICD-10-CM

## 2024-06-03 ENCOUNTER — APPOINTMENT (OUTPATIENT)
Dept: ORTHOPEDIC SURGERY | Facility: CLINIC | Age: 84
End: 2024-06-03
Payer: MEDICARE

## 2024-06-03 ENCOUNTER — TRANSCRIPTION ENCOUNTER (OUTPATIENT)
Age: 84
End: 2024-06-03

## 2024-06-03 VITALS — WEIGHT: 157 LBS | HEIGHT: 63 IN | BODY MASS INDEX: 27.82 KG/M2

## 2024-06-03 DIAGNOSIS — M16.12 UNILATERAL PRIMARY OSTEOARTHRITIS, LEFT HIP: ICD-10-CM

## 2024-06-03 PROBLEM — Z86.69 PERSONAL HISTORY OF OTHER DISEASES OF THE NERVOUS SYSTEM AND SENSE ORGANS: Chronic | Status: ACTIVE | Noted: 2024-04-17

## 2024-06-03 PROBLEM — M51.37 OTHER INTERVERTEBRAL DISC DEGENERATION, LUMBOSACRAL REGION: Chronic | Status: ACTIVE | Noted: 2024-04-17

## 2024-06-03 PROBLEM — N60.19 DIFFUSE CYSTIC MASTOPATHY OF UNSPECIFIED BREAST: Chronic | Status: ACTIVE | Noted: 2024-04-17

## 2024-06-03 PROBLEM — R32 UNSPECIFIED URINARY INCONTINENCE: Chronic | Status: ACTIVE | Noted: 2024-04-17

## 2024-06-03 PROBLEM — G56.00 CARPAL TUNNEL SYNDROME, UNSPECIFIED UPPER LIMB: Chronic | Status: ACTIVE | Noted: 2024-04-17

## 2024-06-03 PROBLEM — G47.33 OBSTRUCTIVE SLEEP APNEA (ADULT) (PEDIATRIC): Chronic | Status: ACTIVE | Noted: 2024-04-17

## 2024-06-03 PROBLEM — Z87.898 PERSONAL HISTORY OF OTHER SPECIFIED CONDITIONS: Chronic | Status: ACTIVE | Noted: 2024-04-17

## 2024-06-03 PROBLEM — F41.9 ANXIETY DISORDER, UNSPECIFIED: Chronic | Status: ACTIVE | Noted: 2024-04-17

## 2024-06-03 PROBLEM — K58.9 IRRITABLE BOWEL SYNDROME, UNSPECIFIED: Chronic | Status: ACTIVE | Noted: 2024-04-17

## 2024-06-03 PROBLEM — Z91.81 HISTORY OF FALLING: Chronic | Status: ACTIVE | Noted: 2024-04-17

## 2024-06-03 PROBLEM — E11.9 TYPE 2 DIABETES MELLITUS WITHOUT COMPLICATIONS: Chronic | Status: ACTIVE | Noted: 2024-04-17

## 2024-06-03 PROBLEM — Z87.81 PERSONAL HISTORY OF (HEALED) TRAUMATIC FRACTURE: Chronic | Status: ACTIVE | Noted: 2024-04-17

## 2024-06-03 PROBLEM — S42.293A: Chronic | Status: ACTIVE | Noted: 2024-04-17

## 2024-06-03 PROBLEM — K57.90 DIVERTICULOSIS OF INTESTINE, PART UNSPECIFIED, WITHOUT PERFORATION OR ABSCESS WITHOUT BLEEDING: Chronic | Status: ACTIVE | Noted: 2024-04-17

## 2024-06-03 PROBLEM — I25.10 ATHEROSCLEROTIC HEART DISEASE OF NATIVE CORONARY ARTERY WITHOUT ANGINA PECTORIS: Chronic | Status: ACTIVE | Noted: 2024-04-17

## 2024-06-03 PROBLEM — K58.9 IRRITABLE BOWEL SYNDROME WITHOUT DIARRHEA: Chronic | Status: ACTIVE | Noted: 2024-04-17

## 2024-06-03 PROBLEM — Z86.19 PERSONAL HISTORY OF OTHER INFECTIOUS AND PARASITIC DISEASES: Chronic | Status: ACTIVE | Noted: 2024-04-17

## 2024-06-03 PROCEDURE — 99024 POSTOP FOLLOW-UP VISIT: CPT

## 2024-06-03 RX ORDER — RIVAROXABAN 10 MG/1
10 TABLET, FILM COATED ORAL
Refills: 0 | Status: ACTIVE | COMMUNITY

## 2024-06-06 PROBLEM — M16.12 PRIMARY LOCALIZED OSTEOARTHRITIS OF LEFT HIP: Status: ACTIVE | Noted: 2024-01-30

## 2024-06-06 NOTE — ASSESSMENT
[FreeTextEntry1] : The patient comes in today for follow-up on her left hip.  She is now 1 month out from a left total hip replacement done on May 2, 2024.  She is doing beautifully.  She is using a walker to get around but has minimal pain.  Examination of the left lower extremity reveals normal neurovascular exam and equal leg lengths.  Her wound is healed with no signs of infection or DVT.  Her range of motion and strength are improving.  X-ray of the left hip 2 views from [facility] shows the left total hip replacement in good position without signs of loosening or wear.  There are no obvious tumors, masses or calcifications seen.  Plan at this time is continue home physical therapy.  I will see her back in the office in 2 months.

## 2024-06-06 NOTE — HISTORY OF PRESENT ILLNESS
[7] : 7 [Meds] : meds [] : yes [FreeTextEntry5] : 85 y/o F presents for PO #1 eval of the Lt. hip today. Pt reports recovery is going well with some pain persisting. Tylenol as needed.  [FreeTextEntry7] : groin  [de-identified] : 5/2/24 [de-identified] : LEFT TOTAL HIP REPLACEMENT

## 2024-06-07 ENCOUNTER — APPOINTMENT (OUTPATIENT)
Dept: FAMILY MEDICINE | Facility: CLINIC | Age: 84
End: 2024-06-07
Payer: MEDICARE

## 2024-06-07 DIAGNOSIS — R19.7 DIARRHEA, UNSPECIFIED: ICD-10-CM

## 2024-06-07 PROCEDURE — 99442: CPT | Mod: 93

## 2024-06-08 ENCOUNTER — TRANSCRIPTION ENCOUNTER (OUTPATIENT)
Age: 84
End: 2024-06-08

## 2024-06-11 ENCOUNTER — RX RENEWAL (OUTPATIENT)
Age: 84
End: 2024-06-11

## 2024-06-11 LAB — CDIFF BY PCR: NOT DETECTED

## 2024-06-12 LAB
BACTERIA STL CULT: NORMAL
DEPRECATED O AND P PREP STL: NORMAL

## 2024-06-19 ENCOUNTER — APPOINTMENT (OUTPATIENT)
Dept: FAMILY MEDICINE | Facility: CLINIC | Age: 84
End: 2024-06-19
Payer: MEDICARE

## 2024-06-19 ENCOUNTER — LABORATORY RESULT (OUTPATIENT)
Age: 84
End: 2024-06-19

## 2024-06-19 ENCOUNTER — TRANSCRIPTION ENCOUNTER (OUTPATIENT)
Age: 84
End: 2024-06-19

## 2024-06-19 VITALS
HEIGHT: 63 IN | TEMPERATURE: 98 F | SYSTOLIC BLOOD PRESSURE: 80 MMHG | BODY MASS INDEX: 24.63 KG/M2 | HEART RATE: 69 BPM | OXYGEN SATURATION: 97 % | DIASTOLIC BLOOD PRESSURE: 60 MMHG | RESPIRATION RATE: 16 BRPM | WEIGHT: 139 LBS

## 2024-06-19 DIAGNOSIS — E03.9 HYPOTHYROIDISM, UNSPECIFIED: ICD-10-CM

## 2024-06-19 DIAGNOSIS — I25.10 ATHEROSCLEROTIC HEART DISEASE OF NATIVE CORONARY ARTERY W/OUT ANGINA PECTORIS: ICD-10-CM

## 2024-06-19 DIAGNOSIS — E11.9 TYPE 2 DIABETES MELLITUS W/OUT COMPLICATIONS: ICD-10-CM

## 2024-06-19 DIAGNOSIS — I95.89 OTHER HYPOTENSION: ICD-10-CM

## 2024-06-19 PROCEDURE — 99214 OFFICE O/P EST MOD 30 MIN: CPT

## 2024-06-19 RX ORDER — GLIPIZIDE 2.5 MG/1
2.5 TABLET, FILM COATED, EXTENDED RELEASE ORAL
Qty: 90 | Refills: 3 | Status: ACTIVE | COMMUNITY
Start: 2024-06-19 | End: 1900-01-01

## 2024-06-19 RX ORDER — FLUCONAZOLE 100 MG/1
100 TABLET ORAL DAILY
Qty: 5 | Refills: 1 | Status: DISCONTINUED | COMMUNITY
Start: 2022-01-27 | End: 2024-06-19

## 2024-06-19 RX ORDER — LISINOPRIL 40 MG/1
40 TABLET ORAL
Qty: 90 | Refills: 1 | Status: ACTIVE | COMMUNITY
Start: 2023-01-03

## 2024-06-19 RX ORDER — CARVEDILOL 25 MG/1
25 TABLET, FILM COATED ORAL
Qty: 180 | Refills: 1 | Status: DISCONTINUED | COMMUNITY
Start: 2020-04-07 | End: 2024-06-19

## 2024-06-19 NOTE — HISTORY OF PRESENT ILLNESS
[de-identified] : Diarrhea resolved since stopping Janumet.  On no diabetic medications glucose has been less than 200.  Check sugar a.m. and p.m. if greater than 180 in the morning or greater than 220 in the evening take glipizide XL 2.5 once or twice a day  Hypotension has been on lisinopril and carvedilol, hydralazine on hold repeat blood pressure 100/60 patient denies lightheadedness.  Discontinue hydralazine discontinue carvedilol if blood pressure greater than 150/90 give 20 mg of lisinopril may give second dose if blood pressure fails to improve  Anemia patient looks pale last hemoglobin was 9.7 denies melena will check CBC  She remains extremely weak trouble standing from a seated position using a walker

## 2024-06-20 LAB
ALBUMIN SERPL ELPH-MCNC: 3.9 G/DL
ALP BLD-CCNC: 77 U/L
ALT SERPL-CCNC: 18 U/L
ANION GAP SERPL CALC-SCNC: 13 MMOL/L
AST SERPL-CCNC: 25 U/L
BASOPHILS # BLD AUTO: 0.08 K/UL
BASOPHILS NFR BLD AUTO: 1.2 %
BILIRUB SERPL-MCNC: 0.3 MG/DL
BUN SERPL-MCNC: 20 MG/DL
CALCIUM SERPL-MCNC: 10.1 MG/DL
CHLORIDE SERPL-SCNC: 107 MMOL/L
CO2 SERPL-SCNC: 23 MMOL/L
CREAT SERPL-MCNC: 0.71 MG/DL
EGFR: 83 ML/MIN/1.73M2
EOSINOPHIL # BLD AUTO: 0.33 K/UL
EOSINOPHIL NFR BLD AUTO: 5.1 %
ESTIMATED AVERAGE GLUCOSE: 123 MG/DL
FERRITIN SERPL-MCNC: 134 NG/ML
FOLATE SERPL-MCNC: >20 NG/ML
GLUCOSE SERPL-MCNC: 114 MG/DL
HBA1C MFR BLD HPLC: 5.9 %
HCT VFR BLD CALC: 34.9 %
HGB BLD-MCNC: 10.8 G/DL
IMM GRANULOCYTES NFR BLD AUTO: 0.3 %
IRON SATN MFR SERPL: 15 %
IRON SERPL-MCNC: 39 UG/DL
LYMPHOCYTES # BLD AUTO: 2.23 K/UL
LYMPHOCYTES NFR BLD AUTO: 34.5 %
MAN DIFF?: NORMAL
MCHC RBC-ENTMCNC: 30.6 PG
MCHC RBC-ENTMCNC: 30.9 GM/DL
MCV RBC AUTO: 98.9 FL
MONOCYTES # BLD AUTO: 0.54 K/UL
MONOCYTES NFR BLD AUTO: 8.3 %
NEUTROPHILS # BLD AUTO: 3.27 K/UL
NEUTROPHILS NFR BLD AUTO: 50.6 %
PLATELET # BLD AUTO: 257 K/UL
POTASSIUM SERPL-SCNC: 4.7 MMOL/L
PROT SERPL-MCNC: 6.4 G/DL
RBC # BLD: 3.53 M/UL
RBC # FLD: 15.4 %
SODIUM SERPL-SCNC: 143 MMOL/L
TIBC SERPL-MCNC: 261 UG/DL
TSH SERPL-ACNC: 0.16 UIU/ML
UIBC SERPL-MCNC: 222 UG/DL
VIT B12 SERPL-MCNC: 757 PG/ML
WBC # FLD AUTO: 6.47 K/UL

## 2024-06-20 RX ORDER — LEVOTHYROXINE SODIUM 75 UG/1
75 TABLET ORAL
Qty: 90 | Refills: 3 | Status: ACTIVE | COMMUNITY
Start: 2021-01-08 | End: 1900-01-01

## 2024-06-24 ENCOUNTER — RX RENEWAL (OUTPATIENT)
Age: 84
End: 2024-06-24

## 2024-06-24 RX ORDER — HYDRALAZINE HYDROCHLORIDE 10 MG/1
10 TABLET ORAL
Qty: 540 | Refills: 3 | Status: ACTIVE | COMMUNITY
Start: 2020-01-02 | End: 1900-01-01

## 2024-07-08 ENCOUNTER — RX RENEWAL (OUTPATIENT)
Age: 84
End: 2024-07-08

## 2024-08-02 NOTE — DATA REVIEWED
Patient of numbness to the left side of her face, states she is having changes to the types of auras she having, states these symptoms began last week   
[FreeTextEntry1] : Yearly routine fasting blood work is reviewed with the patient.

## 2024-08-12 ENCOUNTER — APPOINTMENT (OUTPATIENT)
Dept: ORTHOPEDIC SURGERY | Facility: CLINIC | Age: 84
End: 2024-08-12
Payer: MEDICARE

## 2024-08-12 VITALS — HEIGHT: 63 IN | WEIGHT: 139 LBS | BODY MASS INDEX: 24.63 KG/M2

## 2024-08-12 DIAGNOSIS — M16.12 UNILATERAL PRIMARY OSTEOARTHRITIS, LEFT HIP: ICD-10-CM

## 2024-08-12 PROCEDURE — 99212 OFFICE O/P EST SF 10 MIN: CPT

## 2024-08-14 RX ORDER — BLOOD SUGAR DIAGNOSTIC
STRIP MISCELLANEOUS
Qty: 180 | Refills: 0 | Status: ACTIVE | COMMUNITY
Start: 2024-08-14 | End: 1900-01-01

## 2024-08-14 NOTE — ASSESSMENT
[FreeTextEntry1] : The patient is 3 months from her left total hip replacement done on May 2, 2024.  She is doing beautifully.  She is using a walker to get around but has minimal pain. The patient has been completing PT and doing well. The patient has not needed pain medications. She denies new trauma. She denies paresthesias. The patient will stop PT for now. She can complete ADLs.   Left hip exam: The patient presents with no apparent distress. Neurovascularly intact. Sensation intact to left lower extremity. Operative incision is well healed. + straight leg raise. Tender to palpation to the lateral hip. + SLR. Leg lengths equal. Patient is ambulating with slight soreness.   The patient is doing very well. She will stop PT at this time. She will continue with HEPs. She is happy with her progress for the left hip. She will return as needed.

## 2024-08-14 NOTE — HISTORY OF PRESENT ILLNESS
[4] : 4 [Meds] : meds [] : Post Surgical Visit: yes [FreeTextEntry5] : 83 y/o F presents for PO #2 eval of the Lt. hip today. Pt reports recovery is going well with some mild pain at night. PT finished  [FreeTextEntry7] : groin  [de-identified] : 5/2/24 [de-identified] : LEFT TOTAL HIP REPLACEMENT

## 2024-08-14 NOTE — HISTORY OF PRESENT ILLNESS
[4] : 4 [Meds] : meds [] : Post Surgical Visit: yes [FreeTextEntry5] : 85 y/o F presents for PO #2 eval of the Lt. hip today. Pt reports recovery is going well with some mild pain at night. PT finished  [FreeTextEntry7] : groin  [de-identified] : 5/2/24 [de-identified] : LEFT TOTAL HIP REPLACEMENT

## 2024-08-14 NOTE — HISTORY OF PRESENT ILLNESS
[4] : 4 [Meds] : meds [] : Post Surgical Visit: yes [FreeTextEntry5] : 85 y/o F presents for PO #2 eval of the Lt. hip today. Pt reports recovery is going well with some mild pain at night. PT finished  [FreeTextEntry7] : groin  [de-identified] : 5/2/24 [de-identified] : LEFT TOTAL HIP REPLACEMENT

## 2024-08-19 RX ORDER — BLOOD-GLUCOSE METER
W/DEVICE KIT MISCELLANEOUS
Qty: 1 | Refills: 0 | Status: ACTIVE | COMMUNITY
Start: 2024-08-19

## 2024-08-27 ENCOUNTER — RX RENEWAL (OUTPATIENT)
Age: 84
End: 2024-08-27

## 2024-10-03 ENCOUNTER — LABORATORY RESULT (OUTPATIENT)
Age: 84
End: 2024-10-03

## 2024-10-03 ENCOUNTER — APPOINTMENT (OUTPATIENT)
Dept: FAMILY MEDICINE | Facility: CLINIC | Age: 84
End: 2024-10-03
Payer: MEDICARE

## 2024-10-03 VITALS
WEIGHT: 140.04 LBS | OXYGEN SATURATION: 98 % | HEIGHT: 63 IN | HEART RATE: 72 BPM | SYSTOLIC BLOOD PRESSURE: 160 MMHG | TEMPERATURE: 97.4 F | DIASTOLIC BLOOD PRESSURE: 76 MMHG | BODY MASS INDEX: 24.81 KG/M2

## 2024-10-03 DIAGNOSIS — E11.9 TYPE 2 DIABETES MELLITUS W/OUT COMPLICATIONS: ICD-10-CM

## 2024-10-03 DIAGNOSIS — I25.10 ATHEROSCLEROTIC HEART DISEASE OF NATIVE CORONARY ARTERY W/OUT ANGINA PECTORIS: ICD-10-CM

## 2024-10-03 DIAGNOSIS — R09.89 OTHER SPECIFIED SYMPTOMS AND SIGNS INVOLVING THE CIRCULATORY AND RESPIRATORY SYSTEMS: ICD-10-CM

## 2024-10-03 DIAGNOSIS — R79.0 ABNORMAL LVL OF BLOOD MINERAL: ICD-10-CM

## 2024-10-03 DIAGNOSIS — N32.81 OVERACTIVE BLADDER: ICD-10-CM

## 2024-10-03 PROCEDURE — G2211 COMPLEX E/M VISIT ADD ON: CPT

## 2024-10-03 PROCEDURE — 99214 OFFICE O/P EST MOD 30 MIN: CPT

## 2024-10-03 NOTE — HISTORY OF PRESENT ILLNESS
[FreeTextEntry8] : Enuresis several times a night unable to make it to the commode due to a sense of urgency.  No daytime incontinence but continues to have urgency.  No dysuria.  Status post hysterectomy many years ago for menorrhagia  Has been taking iron every other day for mild anemia we will recheck CBC  On levothyroxine 0.75 for hypothyroidism dosage recently decreased    Hypertension Home blood pressure has been variable she takes lisinopril 40 on an as-needed basis encouraged to take it daily

## 2024-10-03 NOTE — PHYSICAL EXAM
[Normal] : soft, non-tender, non-distended, no masses palpated, no HSM and normal bowel sounds [de-identified] : No suprapubic tenderness

## 2024-10-03 NOTE — ASSESSMENT
[FreeTextEntry1] : Consider overactive bladder await urine analysis and culture prescription from appear check given  Consider lack of ADH nocturnally check urine specific gravity  Recheck thyroid function take lisinopril daily  Recheck CBC

## 2024-10-07 ENCOUNTER — RX CHANGE (OUTPATIENT)
Age: 84
End: 2024-10-07

## 2024-10-07 LAB
ALBUMIN SERPL ELPH-MCNC: 4.6 G/DL
ALP BLD-CCNC: 85 U/L
ALT SERPL-CCNC: 11 U/L
ANION GAP SERPL CALC-SCNC: 16 MMOL/L
APPEARANCE: ABNORMAL
AST SERPL-CCNC: 19 U/L
BASOPHILS # BLD AUTO: 0.01 K/UL
BASOPHILS NFR BLD AUTO: 0.1 %
BILIRUB SERPL-MCNC: 0.4 MG/DL
BILIRUBIN URINE: NEGATIVE
BLOOD URINE: ABNORMAL
BUN SERPL-MCNC: 26 MG/DL
CALCIUM SERPL-MCNC: 10.6 MG/DL
CHLORIDE SERPL-SCNC: 103 MMOL/L
CO2 SERPL-SCNC: 24 MMOL/L
COLOR: NORMAL
CREAT SERPL-MCNC: 0.67 MG/DL
EGFR: 85 ML/MIN/1.73M2
EOSINOPHIL # BLD AUTO: 0 K/UL
EOSINOPHIL NFR BLD AUTO: 0 %
ESTIMATED AVERAGE GLUCOSE: 143 MG/DL
FERRITIN SERPL-MCNC: 71 NG/ML
GLUCOSE QUALITATIVE U: NEGATIVE MG/DL
GLUCOSE SERPL-MCNC: 158 MG/DL
HBA1C MFR BLD HPLC: 6.6 %
HCT VFR BLD CALC: 38.9 %
HGB BLD-MCNC: 12.8 G/DL
IMM GRANULOCYTES NFR BLD AUTO: 0.3 %
IRON SATN MFR SERPL: 16 %
IRON SERPL-MCNC: 58 UG/DL
KETONES URINE: ABNORMAL MG/DL
LEUKOCYTE ESTERASE URINE: ABNORMAL
LYMPHOCYTES # BLD AUTO: 2.03 K/UL
LYMPHOCYTES NFR BLD AUTO: 20.6 %
MAN DIFF?: NORMAL
MCHC RBC-ENTMCNC: 30.5 PG
MCHC RBC-ENTMCNC: 32.9 GM/DL
MCV RBC AUTO: 92.8 FL
MONOCYTES # BLD AUTO: 0.37 K/UL
MONOCYTES NFR BLD AUTO: 3.8 %
NEUTROPHILS # BLD AUTO: 7.41 K/UL
NEUTROPHILS NFR BLD AUTO: 75.2 %
NITRITE URINE: NEGATIVE
PH URINE: 5.5
PLATELET # BLD AUTO: 278 K/UL
POTASSIUM SERPL-SCNC: 4.1 MMOL/L
PROT SERPL-MCNC: 6.7 G/DL
PROTEIN URINE: 30 MG/DL
RBC # BLD: 4.19 M/UL
RBC # FLD: 13.7 %
SODIUM SERPL-SCNC: 143 MMOL/L
SPECIFIC GRAVITY URINE: 1.02
TIBC SERPL-MCNC: 353 UG/DL
TSH SERPL-ACNC: 0.52 UIU/ML
UIBC SERPL-MCNC: 295 UG/DL
UROBILINOGEN URINE: 0.2 MG/DL
WBC # FLD AUTO: 9.85 K/UL

## 2024-10-07 RX ORDER — MIRABEGRON 50 MG/1
50 TABLET, EXTENDED RELEASE ORAL
Qty: 30 | Refills: 3 | Status: DISCONTINUED | COMMUNITY
Start: 2024-10-03 | End: 2024-10-07

## 2024-10-08 RX ORDER — MIRABEGRON 25 MG/1
25 TABLET, FILM COATED, EXTENDED RELEASE ORAL
Qty: 60 | Refills: 1 | Status: ACTIVE | COMMUNITY

## 2024-11-07 ENCOUNTER — APPOINTMENT (OUTPATIENT)
Dept: CARDIOLOGY | Facility: CLINIC | Age: 84
End: 2024-11-07
Payer: MEDICARE

## 2024-11-07 ENCOUNTER — NON-APPOINTMENT (OUTPATIENT)
Age: 84
End: 2024-11-07

## 2024-11-07 VITALS
WEIGHT: 140 LBS | OXYGEN SATURATION: 97 % | DIASTOLIC BLOOD PRESSURE: 76 MMHG | HEART RATE: 77 BPM | HEIGHT: 63 IN | BODY MASS INDEX: 24.8 KG/M2 | SYSTOLIC BLOOD PRESSURE: 156 MMHG

## 2024-11-07 DIAGNOSIS — E78.5 HYPERLIPIDEMIA, UNSPECIFIED: ICD-10-CM

## 2024-11-07 DIAGNOSIS — I10 ESSENTIAL (PRIMARY) HYPERTENSION: ICD-10-CM

## 2024-11-07 PROCEDURE — 93000 ELECTROCARDIOGRAM COMPLETE: CPT

## 2024-11-07 PROCEDURE — 99214 OFFICE O/P EST MOD 30 MIN: CPT | Mod: 25

## 2024-11-23 ENCOUNTER — APPOINTMENT (OUTPATIENT)
Dept: FAMILY MEDICINE | Facility: CLINIC | Age: 84
End: 2024-11-23
Payer: MEDICARE

## 2024-11-23 DIAGNOSIS — Z23 ENCOUNTER FOR IMMUNIZATION: ICD-10-CM

## 2024-11-23 PROCEDURE — 90662 IIV NO PRSV INCREASED AG IM: CPT

## 2024-11-23 PROCEDURE — G0008: CPT

## 2024-12-26 NOTE — PATIENT PROFILE ADULT - FUNCTIONAL ASSESSMENT - DAILY ACTIVITY 6.
PT to the ED as a transfer from [facility] for optho. PT endorses right eye pain since Friday with floaters. Vision loss in the right eye no pain.   
3 = A little assistance

## 2025-02-04 RX ORDER — TOLTERODINE TARTRATE 2 MG/1
2 CAPSULE, EXTENDED RELEASE ORAL
Qty: 60 | Refills: 1 | Status: ACTIVE | COMMUNITY
Start: 2025-02-04 | End: 1900-01-01

## 2025-03-12 NOTE — H&P PST ADULT - GASTROINTESTINAL COMMENTS
[email or web address] or call [phone].     New Medication/Medication Changes/Medication Refill  Take amiodarone 200 mg tab daily for 2 weeks then stop      **please allow 24-48 hrs for medication to be escribed to pharmacy** If you need any refills on medications please contact your pharmacy so that the request can be escribed to the provider for review seven to 10 days prior to being out of medication.  
IBS

## 2025-03-27 ENCOUNTER — RX RENEWAL (OUTPATIENT)
Age: 85
End: 2025-03-27

## 2025-05-20 ENCOUNTER — NON-APPOINTMENT (OUTPATIENT)
Age: 85
End: 2025-05-20

## 2025-05-20 ENCOUNTER — APPOINTMENT (OUTPATIENT)
Dept: CARDIOLOGY | Facility: CLINIC | Age: 85
End: 2025-05-20
Payer: MEDICARE

## 2025-05-20 VITALS
DIASTOLIC BLOOD PRESSURE: 86 MMHG | HEIGHT: 63 IN | OXYGEN SATURATION: 93 % | SYSTOLIC BLOOD PRESSURE: 122 MMHG | HEART RATE: 76 BPM

## 2025-05-20 DIAGNOSIS — I10 ESSENTIAL (PRIMARY) HYPERTENSION: ICD-10-CM

## 2025-05-20 DIAGNOSIS — I25.10 ATHEROSCLEROTIC HEART DISEASE OF NATIVE CORONARY ARTERY W/OUT ANGINA PECTORIS: ICD-10-CM

## 2025-05-20 PROCEDURE — 99214 OFFICE O/P EST MOD 30 MIN: CPT | Mod: 25

## 2025-05-20 PROCEDURE — 93000 ELECTROCARDIOGRAM COMPLETE: CPT

## 2025-05-29 ENCOUNTER — RX CHANGE (OUTPATIENT)
Age: 85
End: 2025-05-29

## 2025-05-29 ENCOUNTER — RX RENEWAL (OUTPATIENT)
Age: 85
End: 2025-05-29

## 2025-05-29 RX ORDER — LEVOTHYROXINE SODIUM 0.07 MG/1
75 TABLET ORAL
Qty: 90 | Refills: 3 | Status: ACTIVE | COMMUNITY
Start: 2025-05-29 | End: 1900-01-01

## 2025-06-05 ENCOUNTER — APPOINTMENT (OUTPATIENT)
Dept: FAMILY MEDICINE | Facility: CLINIC | Age: 85
End: 2025-06-05

## 2025-07-01 ENCOUNTER — RX RENEWAL (OUTPATIENT)
Age: 85
End: 2025-07-01

## 2025-07-10 ENCOUNTER — RX RENEWAL (OUTPATIENT)
Age: 85
End: 2025-07-10

## 2025-07-30 ENCOUNTER — APPOINTMENT (OUTPATIENT)
Dept: FAMILY MEDICINE | Facility: CLINIC | Age: 85
End: 2025-07-30
Payer: MEDICARE

## 2025-07-30 DIAGNOSIS — E11.9 TYPE 2 DIABETES MELLITUS W/OUT COMPLICATIONS: ICD-10-CM

## 2025-07-30 PROCEDURE — 99213 OFFICE O/P EST LOW 20 MIN: CPT | Mod: 93

## 2025-08-08 ENCOUNTER — RX RENEWAL (OUTPATIENT)
Age: 85
End: 2025-08-08

## 2025-08-30 ENCOUNTER — EMERGENCY (EMERGENCY)
Facility: HOSPITAL | Age: 85
LOS: 0 days | Discharge: HOME | End: 2025-08-31
Attending: STUDENT IN AN ORGANIZED HEALTH CARE EDUCATION/TRAINING PROGRAM
Payer: MEDICARE

## 2025-08-30 VITALS
HEART RATE: 93 BPM | DIASTOLIC BLOOD PRESSURE: 102 MMHG | SYSTOLIC BLOOD PRESSURE: 159 MMHG | OXYGEN SATURATION: 97 % | RESPIRATION RATE: 18 BRPM | TEMPERATURE: 97 F

## 2025-08-30 DIAGNOSIS — S09.90XA UNSPECIFIED INJURY OF HEAD, INITIAL ENCOUNTER: ICD-10-CM
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
## 2025-08-30 DIAGNOSIS — Z98.890 OTHER SPECIFIED POSTPROCEDURAL STATES: Chronic | ICD-10-CM

## 2025-08-30 DIAGNOSIS — I87.2 VENOUS INSUFFICIENCY (CHRONIC) (PERIPHERAL): Chronic | ICD-10-CM

## 2025-08-30 DIAGNOSIS — Z96.651 PRESENCE OF RIGHT ARTIFICIAL KNEE JOINT: Chronic | ICD-10-CM

## 2025-08-30 DIAGNOSIS — Z90.710 ACQUIRED ABSENCE OF BOTH CERVIX AND UTERUS: Chronic | ICD-10-CM

## 2025-08-30 DIAGNOSIS — Z85.828 PERSONAL HISTORY OF OTHER MALIGNANT NEOPLASM OF SKIN: Chronic | ICD-10-CM

## 2025-08-30 DIAGNOSIS — Z96.641 PRESENCE OF RIGHT ARTIFICIAL HIP JOINT: Chronic | ICD-10-CM

## 2025-08-30 DIAGNOSIS — Z90.49 ACQUIRED ABSENCE OF OTHER SPECIFIED PARTS OF DIGESTIVE TRACT: Chronic | ICD-10-CM

## 2025-08-30 DIAGNOSIS — Z90.89 ACQUIRED ABSENCE OF OTHER ORGANS: Chronic | ICD-10-CM

## 2025-08-30 DIAGNOSIS — S00.12XA CONTUSION OF LEFT EYELID AND PERIOCULAR AREA, INITIAL ENCOUNTER: ICD-10-CM

## 2025-08-30 DIAGNOSIS — Z98.49 CATARACT EXTRACTION STATUS, UNSPECIFIED EYE: Chronic | ICD-10-CM
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
## 2025-08-30 DIAGNOSIS — Z88.1 ALLERGY STATUS TO OTHER ANTIBIOTIC AGENTS: ICD-10-CM

## 2025-08-30 PROCEDURE — 99284 EMERGENCY DEPT VISIT MOD MDM: CPT

## 2025-08-30 PROCEDURE — 72125 CT NECK SPINE W/O DYE: CPT | Mod: 26

## 2025-08-30 PROCEDURE — 70450 CT HEAD/BRAIN W/O DYE: CPT | Mod: 26

## 2025-08-30 PROCEDURE — 70450 CT HEAD/BRAIN W/O DYE: CPT

## 2025-08-30 PROCEDURE — 72125 CT NECK SPINE W/O DYE: CPT

## 2025-08-30 PROCEDURE — 99284 EMERGENCY DEPT VISIT MOD MDM: CPT | Mod: 25

## 2025-08-30 RX ORDER — ACETAMINOPHEN 500 MG/5ML
650 LIQUID (ML) ORAL ONCE
Refills: 0 | Status: COMPLETED | OUTPATIENT
Start: 2025-08-30 | End: 2025-08-30

## 2025-08-31 VITALS
RESPIRATION RATE: 17 BRPM | OXYGEN SATURATION: 96 % | SYSTOLIC BLOOD PRESSURE: 156 MMHG | TEMPERATURE: 98 F | DIASTOLIC BLOOD PRESSURE: 79 MMHG | HEART RATE: 86 BPM

## 2025-08-31 RX ADMIN — Medication 650 MILLIGRAM(S): at 00:00

## 2025-09-03 ENCOUNTER — NON-APPOINTMENT (OUTPATIENT)
Age: 85
End: 2025-09-03

## 2025-09-04 ENCOUNTER — APPOINTMENT (OUTPATIENT)
Dept: FAMILY MEDICINE | Facility: CLINIC | Age: 85
End: 2025-09-04

## 2025-09-04 ENCOUNTER — NON-APPOINTMENT (OUTPATIENT)
Age: 85
End: 2025-09-04

## 2025-09-04 VITALS
OXYGEN SATURATION: 97 % | DIASTOLIC BLOOD PRESSURE: 84 MMHG | BODY MASS INDEX: 27.46 KG/M2 | SYSTOLIC BLOOD PRESSURE: 152 MMHG | WEIGHT: 155 LBS | HEIGHT: 63 IN | HEART RATE: 88 BPM | TEMPERATURE: 97.8 F

## 2025-09-04 DIAGNOSIS — E11.29 TYPE 2 DIABETES MELLITUS WITH OTHER DIABETIC KIDNEY COMPLICATION: ICD-10-CM

## 2025-09-04 DIAGNOSIS — I25.10 ATHEROSCLEROTIC HEART DISEASE OF NATIVE CORONARY ARTERY W/OUT ANGINA PECTORIS: ICD-10-CM

## 2025-09-04 DIAGNOSIS — I10 ESSENTIAL (PRIMARY) HYPERTENSION: ICD-10-CM

## 2025-09-04 DIAGNOSIS — R80.9 TYPE 2 DIABETES MELLITUS WITH OTHER DIABETIC KIDNEY COMPLICATION: ICD-10-CM

## 2025-09-04 DIAGNOSIS — E03.9 HYPOTHYROIDISM, UNSPECIFIED: ICD-10-CM

## 2025-09-04 DIAGNOSIS — E11.9 TYPE 2 DIABETES MELLITUS W/OUT COMPLICATIONS: ICD-10-CM

## 2025-09-04 DIAGNOSIS — F41.9 ANXIETY DISORDER, UNSPECIFIED: ICD-10-CM

## 2025-09-04 DIAGNOSIS — Z87.898 PERSONAL HISTORY OF OTHER SPECIFIED CONDITIONS: ICD-10-CM

## 2025-09-04 DIAGNOSIS — L30.9 DERMATITIS, UNSPECIFIED: ICD-10-CM

## 2025-09-04 DIAGNOSIS — M25.511 PAIN IN RIGHT SHOULDER: ICD-10-CM

## 2025-09-04 DIAGNOSIS — M75.52 BURSITIS OF LEFT SHOULDER: ICD-10-CM

## 2025-09-04 DIAGNOSIS — M19.012 PRIMARY OSTEOARTHRITIS, LEFT SHOULDER: ICD-10-CM

## 2025-09-04 DIAGNOSIS — S05.12XA CONTUSION OF EYEBALL AND ORBITAL TISSUES, LEFT EYE, INITIAL ENCOUNTER: ICD-10-CM

## 2025-09-04 DIAGNOSIS — R09.89 OTHER SPECIFIED SYMPTOMS AND SIGNS INVOLVING THE CIRCULATORY AND RESPIRATORY SYSTEMS: ICD-10-CM

## 2025-09-04 DIAGNOSIS — E78.5 HYPERLIPIDEMIA, UNSPECIFIED: ICD-10-CM

## 2025-09-04 PROCEDURE — G0438: CPT

## 2025-09-04 PROCEDURE — 20610 DRAIN/INJ JOINT/BURSA W/O US: CPT | Mod: 50

## 2025-09-04 RX ORDER — KETOCONAZOLE 20 MG/G
2 CREAM TOPICAL TWICE DAILY
Qty: 1 | Refills: 3 | Status: ACTIVE | COMMUNITY
Start: 2025-09-04 | End: 1900-01-01

## 2025-09-04 RX ADMIN — METHYLPREDNISOLONE ACETATE 0 MG/ML: 40 INJECTION, SUSPENSION INTRA-ARTICULAR; INTRALESIONAL; INTRAMUSCULAR; INTRASYNOVIAL; SOFT TISSUE at 00:00

## 2025-09-05 RX ORDER — METHYLPRED ACET/NACL,ISO-OS/PF 40 MG/ML
40 VIAL (ML) INJECTION
Qty: 0 | Refills: 0 | Status: COMPLETED | OUTPATIENT
Start: 2025-09-04